# Patient Record
Sex: FEMALE | Race: WHITE | Employment: FULL TIME | URBAN - METROPOLITAN AREA
[De-identification: names, ages, dates, MRNs, and addresses within clinical notes are randomized per-mention and may not be internally consistent; named-entity substitution may affect disease eponyms.]

---

## 2017-02-02 ENCOUNTER — HOSPITAL ENCOUNTER (EMERGENCY)
Age: 57
Discharge: HOME | End: 2017-02-02
Attending: FAMILY MEDICINE

## 2017-02-02 VITALS
TEMPERATURE: 98.1 F | DIASTOLIC BLOOD PRESSURE: 88 MMHG | OXYGEN SATURATION: 97 % | HEART RATE: 83 BPM | HEIGHT: 63 IN | WEIGHT: 196.6 LBS | RESPIRATION RATE: 18 BRPM | BODY MASS INDEX: 34.84 KG/M2 | SYSTOLIC BLOOD PRESSURE: 130 MMHG

## 2017-02-02 DIAGNOSIS — J01.90 ACUTE SINUSITIS, RECURRENCE NOT SPECIFIED, UNSPECIFIED LOCATION: ICD-10-CM

## 2017-02-02 DIAGNOSIS — H65.03 BILATERAL ACUTE SEROUS OTITIS MEDIA, RECURRENCE NOT SPECIFIED: Primary | ICD-10-CM

## 2017-02-02 RX ORDER — FLUTICASONE PROPIONATE 50 MCG
2 SPRAY, SUSPENSION (ML) NASAL DAILY
Qty: 1 BOTTLE | Refills: 0 | Status: SHIPPED | OUTPATIENT
Start: 2017-02-02 | End: 2017-11-30

## 2017-02-02 RX ORDER — AMOXICILLIN AND CLAVULANATE POTASSIUM 875; 125 MG/1; MG/1
1 TABLET, FILM COATED ORAL 2 TIMES DAILY
Qty: 20 TAB | Refills: 0 | Status: SHIPPED | OUTPATIENT
Start: 2017-02-02 | End: 2017-04-10

## 2017-02-02 NOTE — DISCHARGE INSTRUCTIONS
Middle Ear Fluid: Care Instructions  Your Care Instructions    Fluid often builds up inside the ear during a cold or allergies. Usually the fluid drains away, but sometimes a small tube in the ear, called the eustachian tube, stays blocked for months. Symptoms of fluid buildup may include:  · Popping, ringing, or a feeling of fullness or pressure in the ear. · Trouble hearing. · Balance problems and dizziness. In most cases, you can treat yourself at home. Follow-up care is a key part of your treatment and safety. Be sure to make and go to all appointments, and call your doctor if you are having problems. It's also a good idea to know your test results and keep a list of the medicines you take. How can you care for yourself at home? · In most cases, the fluid clears up within a few months without treatment. You may need more tests if the fluid does not clear up after 3 months. · If your doctor prescribed antibiotics, take them as directed. Do not stop taking them just because you feel better. You need to take the full course of antibiotics. When should you call for help? Watch closely for changes in your health, and be sure to contact your doctor if:  · You have pain or a fever. · You have any new symptoms, such as hearing problems. · You do not get better as expected. Where can you learn more? Go to http://anna-sanam.info/. Enter M792 in the search box to learn more about \"Middle Ear Fluid: Care Instructions. \"  Current as of: July 29, 2016  Content Version: 11.1  © 4205-8718 [employer]. Care instructions adapted under license by [employer] (which disclaims liability or warranty for this information). If you have questions about a medical condition or this instruction, always ask your healthcare professional. [place] [address] any warranty or liability for your use of this information.          Saline Nasal Washes: Care Instructions  Your Care Instructions  Saline nasal washes help keep the nasal passages open by washing out thick or dried mucus. This simple remedy can help relieve symptoms of allergies, sinusitis, and colds. It also can make the nose feel more comfortable by keeping the mucous membranes moist. You may notice a little burning sensation in your nose the first few times you use the solution, but this usually gets better in a few days. Follow-up care is a key part of your treatment and safety. Be sure to make and go to all appointments, and call your doctor if you are having problems. It's also a good idea to know your test results and keep a list of the medicines you take. How can you care for yourself at home? · You can buy premixed saline solution in a squeeze bottle or other sinus rinse products at a drugstore. Read and follow the instructions on the label. · You also can make your own saline solution by adding 1 teaspoon of salt and 1 teaspoon of baking soda to 2 cups of distilled water. · If you use a homemade solution, pour a small amount into a clean bowl. Using a rubber bulb syringe, squeeze the syringe and place the tip in the salt water. Pull a small amount of the salt water into the syringe by relaxing your hand. · Sit down with your head tilted slightly back. Do not lie down. Put the tip of the bulb syringe or the squeeze bottle a little way into one of your nostrils. Gently drip or squirt a few drops into the nostril. Repeat with the other nostril. Some sneezing and gagging are normal at first.  · Gently blow your nose. · Wipe the syringe or bottle tip clean after each use. · Repeat this 2 or 3 times a day. · Use nasal washes gently if you have nosebleeds often. When should you call for help? Watch closely for changes in your health, and be sure to contact your doctor if:  · You often get nosebleeds. · You have problems doing the nasal washes. Where can you learn more?   Go to http://anna-sanam.info/. Enter [id number] [id number] [id number] in the search box to learn more about \"Saline Nasal Washes: Care Instructions. \"  Current as of: July 29, 2016  Content Version: 11.1  © 3732-9787 [employer]. Care instructions adapted under license by [employer] (which disclaims liability or warranty for this information). If you have questions about a medical condition or this instruction, always ask your healthcare professional. [address] any warranty or liability for your use of this information.

## 2017-02-02 NOTE — UC PROVIDER NOTE
Patient is a 49 y.o. female presenting with ear pain. The history is provided by the patient. Ear Pain    This is a new problem. The current episode started more than 1 week ago. The problem occurs daily. The problem has been gradually worsening. Patient complains that both ears are affected. There has been no fever. The pain is at a severity of 9/10. The pain is severe. Associated symptoms include headaches. Pertinent negatives include no ear discharge, no hearing loss and no sore throat. The risk factors include recent URI. Her past medical history does not include chronic ear infection. Past Medical History   Diagnosis Date    Hypertension     Sleep apnea         Past Surgical History   Procedure Laterality Date    Hx cholecystectomy      Hx cholecystectomy      Hx  section   and     Hx gyn       ablation    Hx gyn  2015     hysterectomy    Hx cervical diskectomy  2003    Hx orthopaedic       Neck Surgery.  Hx orthopaedic Left      knee surgery x 3    Hx rotator cuff repair Right 2006         Family History   Problem Relation Age of Onset    Hypertension Mother     Hypertension Father     Diabetes Sister     Anesth Problems Neg Hx         Social History     Social History    Marital status:      Spouse name: N/A    Number of children: N/A    Years of education: N/A     Occupational History    Not on file. Social History Main Topics    Smoking status: Never Smoker    Smokeless tobacco: Never Used    Alcohol use Yes      Comment: OCC    Drug use: No    Sexual activity: Not on file     Other Topics Concern    Not on file     Social History Narrative                ALLERGIES: Review of patient's allergies indicates no known allergies. Review of Systems   HENT: Positive for ear pain. Negative for ear discharge, hearing loss and sore throat. Neurological: Positive for headaches.        Vitals:    17 1624   BP: 130/88   Pulse: 83   Resp: 18 Temp: 98.1 °F (36.7 °C)   SpO2: 97%   Weight: 89.2 kg (196 lb 9.6 oz)   Height: 5' 3\" (1.6 m)       Physical Exam   Constitutional: No distress. HENT:   Right Ear: Tympanic membrane and ear canal normal.   Left Ear: Tympanic membrane and ear canal normal.   Nose: Nose normal.   Mouth/Throat: No oropharyngeal exudate, posterior oropharyngeal edema or posterior oropharyngeal erythema. Eyes: Conjunctivae are normal. Right eye exhibits no discharge. Left eye exhibits no discharge. Neck: Neck supple. Pulmonary/Chest: Effort normal and breath sounds normal. No respiratory distress. She has no wheezes. She has no rales. Lymphadenopathy:     She has no cervical adenopathy. Skin: No rash noted. Nursing note and vitals reviewed. MDM     Differential Diagnosis; Clinical Impression; Plan:     CLINICAL IMPRESSION:  Bilateral acute serous otitis media, recurrence not specified  (primary encounter diagnosis)  Acute sinusitis, recurrence not specified, unspecified location      DDX- ET dysfunction    Plan:    Fluids/ gargles  Claritin/ allegra   Tylenol cold-sinus - max strength 1-2 tab 4 times/ day    with Advil as needed    Start augmentin/ flonase and sinus rinse      Amount and/or Complexity of Data Reviewed:    Review and summarize past medical records:  Yes  Risk of Significant Complications, Morbidity, and/or Mortality:   Presenting problems:   Moderate  Progress:   Patient progress:  Stable      Procedures

## 2017-04-10 ENCOUNTER — HOSPITAL ENCOUNTER (EMERGENCY)
Age: 57
Discharge: HOME | End: 2017-04-10
Attending: FAMILY MEDICINE

## 2017-04-10 VITALS
HEIGHT: 63 IN | OXYGEN SATURATION: 99 % | HEART RATE: 87 BPM | RESPIRATION RATE: 18 BRPM | DIASTOLIC BLOOD PRESSURE: 75 MMHG | SYSTOLIC BLOOD PRESSURE: 117 MMHG | WEIGHT: 204.8 LBS | TEMPERATURE: 98.5 F | BODY MASS INDEX: 36.29 KG/M2

## 2017-04-10 DIAGNOSIS — J01.90 ACUTE NON-RECURRENT SINUSITIS, UNSPECIFIED LOCATION: Primary | ICD-10-CM

## 2017-04-10 RX ORDER — BENZONATATE 200 MG/1
200 CAPSULE ORAL
Qty: 30 CAP | Refills: 0 | Status: SHIPPED | OUTPATIENT
Start: 2017-04-10 | End: 2017-11-30

## 2017-04-10 RX ORDER — CEFDINIR 300 MG/1
300 CAPSULE ORAL 2 TIMES DAILY
Qty: 20 CAP | Refills: 0 | Status: SHIPPED | OUTPATIENT
Start: 2017-04-10 | End: 2017-04-20

## 2017-04-10 RX ORDER — PREDNISONE 20 MG/1
60 TABLET ORAL DAILY
Qty: 15 TAB | Refills: 0 | Status: SHIPPED | OUTPATIENT
Start: 2017-04-10 | End: 2017-04-15

## 2017-04-10 NOTE — UC PROVIDER NOTE
Patient is a 49 y.o. female presenting with sinus problems. The history is provided by the patient. No  was used. Sinus Infection    This is a new problem. Episode onset: 6 days. The problem has been gradually worsening. Patient reports a subjective fever - was not measured. The fever has been present for 1 - 2 days. The pain is at a severity of 0/10. The patient is experiencing no pain. Associated symptoms include congestion, sinus pressure, cough and rhinorrhea. Pertinent negatives include no chills, no hoarse voice, no shortness of breath, no neck pain, no neck pain and no headaches. Treatments tried: Zyrtec. The treatment provided no relief. Past Medical History:   Diagnosis Date    Hypertension     Sleep apnea         Past Surgical History:   Procedure Laterality Date    HX CERVICAL DISKECTOMY      HX  SECTION   and     HX CHOLECYSTECTOMY      HX CHOLECYSTECTOMY      HX GYN      ablation    HX GYN  2015    hysterectomy    HX ORTHOPAEDIC      Neck Surgery.  HX ORTHOPAEDIC Left     knee surgery x 3    HX ROTATOR CUFF REPAIR Right 2006         Family History   Problem Relation Age of Onset    Hypertension Mother     Hypertension Father     Diabetes Sister     Anesth Problems Neg Hx         Social History     Social History    Marital status:      Spouse name: N/A    Number of children: N/A    Years of education: N/A     Occupational History    Not on file. Social History Main Topics    Smoking status: Never Smoker    Smokeless tobacco: Never Used    Alcohol use Yes      Comment: OCC    Drug use: No    Sexual activity: Not on file     Other Topics Concern    Not on file     Social History Narrative                ALLERGIES: Review of patient's allergies indicates no known allergies. Review of Systems   Constitutional: Negative. Negative for chills. HENT: Positive for congestion, rhinorrhea, sinus pressure and sneezing. Negative for hoarse voice and voice change. Eyes: Negative. Respiratory: Positive for cough. Negative for shortness of breath. Cardiovascular: Negative. Gastrointestinal: Negative. Endocrine: Negative. Genitourinary: Negative. Musculoskeletal: Negative. Negative for neck pain. Skin: Negative. Allergic/Immunologic: Negative. Neurological: Negative. Negative for headaches. Hematological: Negative. Psychiatric/Behavioral: Negative. Vitals:    04/10/17 1448   BP: 117/75   Pulse: 87   Resp: 18   Temp: 98.5 °F (36.9 °C)   SpO2: 99%   Weight: 92.9 kg (204 lb 12.8 oz)   Height: 5' 3\" (1.6 m)       Physical Exam   Constitutional: She is oriented to person, place, and time. She appears well-developed and well-nourished. HENT:   Head: Normocephalic and atraumatic. Right Ear: External ear normal.   Left Ear: External ear normal.   Nose: Nose normal.   Mouth/Throat: Oropharynx is clear and moist.   Eyes: Conjunctivae and EOM are normal. Pupils are equal, round, and reactive to light. Neck: Normal range of motion. Neck supple. Cardiovascular: Normal rate, regular rhythm and normal heart sounds. Pulmonary/Chest: Effort normal and breath sounds normal.   Musculoskeletal: Normal range of motion. Neurological: She is alert and oriented to person, place, and time. Skin: Skin is warm and dry. Psychiatric: She has a normal mood and affect. Her behavior is normal. Judgment and thought content normal.   Nursing note and vitals reviewed. MDM     Differential Diagnosis; Clinical Impression; Plan:     CLINICAL IMPRESSION:  Acute non-recurrent sinusitis, unspecified location  (primary encounter diagnosis)    Plan:  1. Sinusitis  2. Use a nasal rinse and take all medications as directed. 3. Follow up with PCP as needed. Risk of Significant Complications, Morbidity, and/or Mortality:   Presenting problems: Moderate  Diagnostic procedures:   Moderate  Progress:   Patient progress:  Stable      Procedures

## 2017-04-10 NOTE — DISCHARGE INSTRUCTIONS
Saline Nasal Washes: Care Instructions  Your Care Instructions  Saline nasal washes help keep the nasal passages open by washing out thick or dried mucus. This simple remedy can help relieve symptoms of allergies, sinusitis, and colds. It also can make the nose feel more comfortable by keeping the mucous membranes moist. You may notice a little burning sensation in your nose the first few times you use the solution, but this usually gets better in a few days. Follow-up care is a key part of your treatment and safety. Be sure to make and go to all appointments, and call your doctor if you are having problems. It's also a good idea to know your test results and keep a list of the medicines you take. How can you care for yourself at home? · You can buy premixed saline solution in a squeeze bottle or other sinus rinse products at a drugstore. Read and follow the instructions on the label. · You also can make your own saline solution by adding 1 teaspoon of salt and 1 teaspoon of baking soda to 2 cups of distilled water. · If you use a homemade solution, pour a small amount into a clean bowl. Using a rubber bulb syringe, squeeze the syringe and place the tip in the salt water. Pull a small amount of the salt water into the syringe by relaxing your hand. · Sit down with your head tilted slightly back. Do not lie down. Put the tip of the bulb syringe or the squeeze bottle a little way into one of your nostrils. Gently drip or squirt a few drops into the nostril. Repeat with the other nostril. Some sneezing and gagging are normal at first.  · Gently blow your nose. · Wipe the syringe or bottle tip clean after each use. · Repeat this 2 or 3 times a day. · Use nasal washes gently if you have nosebleeds often. When should you call for help? Watch closely for changes in your health, and be sure to contact your doctor if:  · You often get nosebleeds. · You have problems doing the nasal washes.   Where can you learn more? Go to http://anna-sanam.info/. Enter [id number] in the search box to learn more about \"Saline Nasal Washes: Care Instructions. \"  Current as of: July 29, 2016  Content Version: 11.2  © 5770-0945 [employer]. Care instructions adapted under license by [employer] (which disclaims liability or warranty for this information). If you have questions about a medical condition or this instruction, always ask your healthcare professional. [address] any warranty or liability for your use of this information. Sinusitis: Care Instructions  Your Care Instructions    Sinusitis is an infection of the lining of the sinus cavities in your head. Sinusitis often follows a cold. It causes pain and pressure in your head and face. In most cases, sinusitis gets better on its own in 1 to 2 weeks. But some mild symptoms may last for several weeks. Sometimes antibiotics are needed. Follow-up care is a key part of your treatment and safety. Be sure to make and go to all appointments, and call your doctor if you are having problems. It's also a good idea to know your test results and keep a list of the medicines you take. How can you care for yourself at home? · Take an over-the-counter pain medicine, such as acetaminophen (Tylenol), ibuprofen (Advil, Motrin), or naproxen (Aleve). Read and follow all instructions on the label. · If the doctor prescribed antibiotics, take them as directed. Do not stop taking them just because you feel better. You need to take the full course of antibiotics. · Be careful when taking over-the-counter cold or flu medicines and Tylenol at the same time. Many of these medicines have acetaminophen, which is Tylenol. Read the labels to make sure that you are not taking more than the recommended dose. Too much acetaminophen (Tylenol) can be harmful.   · Breathe warm, moist air from a steamy shower, a hot bath, or a sink filled with hot water. Avoid cold, dry air. Using a humidifier in your home may help. Follow the directions for cleaning the machine. · Use saline (saltwater) nasal washes to help keep your nasal passages open and wash out mucus and bacteria. You can buy saline nose drops at a grocery store or drugstore. Or you can make your own at home by adding 1 teaspoon of salt and 1 teaspoon of baking soda to 2 cups of distilled water. If you make your own, fill a bulb syringe with the solution, insert the tip into your nostril, and squeeze gently. Inyo Maffucci your nose. · Put a hot, wet towel or a warm gel pack on your face 3 or 4 times a day for 5 to 10 minutes each time. · Try a decongestant nasal spray like oxymetazoline (Afrin). Do not use it for more than 3 days in a row. Using it for more than 3 days can make your congestion worse. When should you call for help? Call your doctor now or seek immediate medical care if:  · You have new or worse swelling or redness in your face or around your eyes. · You have a new or higher fever. Watch closely for changes in your health, and be sure to contact your doctor if:  · You have new or worse facial pain. · The mucus from your nose becomes thicker (like pus) or has new blood in it. · You are not getting better as expected. Where can you learn more? Go to http://anna-sanam.info/. Enter M049 in the search box to learn more about \"Sinusitis: Care Instructions. \"  Current as of: July 29, 2016  Content Version: 11.2  © 3737-3152 [employer]. Care instructions adapted under license by [employer] (which disclaims liability or warranty for this information). If you have questions about a medical condition or this instruction, always ask your healthcare professional. [address] any warranty or liability for your use of this information.

## 2017-11-10 ENCOUNTER — HOSPITAL ENCOUNTER (OUTPATIENT)
Dept: CT IMAGING | Age: 57
Discharge: HOME | End: 2017-11-10
Attending: PHYSICIAN ASSISTANT
Payer: COMMERCIAL

## 2017-11-10 DIAGNOSIS — K57.92 DIVERTICULITIS: ICD-10-CM

## 2017-11-10 DIAGNOSIS — R12 HEART BURN: ICD-10-CM

## 2017-11-10 DIAGNOSIS — R10.9 ABDOMINAL PAIN: ICD-10-CM

## 2017-11-10 PROCEDURE — 74011000255 HC RX REV CODE- 255: Performed by: PHYSICIAN ASSISTANT

## 2017-11-10 PROCEDURE — 74011636320 HC RX REV CODE- 636/320: Performed by: PHYSICIAN ASSISTANT

## 2017-11-10 PROCEDURE — 74177 CT ABD & PELVIS W/CONTRAST: CPT

## 2017-11-10 PROCEDURE — 74011250636 HC RX REV CODE- 250/636: Performed by: PHYSICIAN ASSISTANT

## 2017-11-10 RX ORDER — BARIUM SULFATE 20 MG/ML
900 SUSPENSION ORAL
Status: COMPLETED | OUTPATIENT
Start: 2017-11-10 | End: 2017-11-10

## 2017-11-10 RX ORDER — SODIUM CHLORIDE 0.9 % (FLUSH) 0.9 %
10 SYRINGE (ML) INJECTION
Status: COMPLETED | OUTPATIENT
Start: 2017-11-10 | End: 2017-11-10

## 2017-11-10 RX ORDER — SODIUM CHLORIDE 9 MG/ML
50 INJECTION, SOLUTION INTRAVENOUS
Status: COMPLETED | OUTPATIENT
Start: 2017-11-10 | End: 2017-11-10

## 2017-11-10 RX ADMIN — IOPAMIDOL 100 ML: 755 INJECTION, SOLUTION INTRAVENOUS at 17:55

## 2017-11-10 RX ADMIN — BARIUM SULFATE 900 ML: 21 SUSPENSION ORAL at 17:55

## 2017-11-10 RX ADMIN — Medication 10 ML: at 17:55

## 2017-11-10 RX ADMIN — SODIUM CHLORIDE 50 ML/HR: 900 INJECTION, SOLUTION INTRAVENOUS at 17:55

## 2017-11-30 ENCOUNTER — HOSPITAL ENCOUNTER (OUTPATIENT)
Dept: CT IMAGING | Age: 57
Discharge: HOME | End: 2017-11-30
Attending: UROLOGY
Payer: COMMERCIAL

## 2017-11-30 VITALS
DIASTOLIC BLOOD PRESSURE: 70 MMHG | HEART RATE: 68 BPM | RESPIRATION RATE: 16 BRPM | HEIGHT: 63 IN | SYSTOLIC BLOOD PRESSURE: 126 MMHG | OXYGEN SATURATION: 97 % | TEMPERATURE: 98.5 F | BODY MASS INDEX: 34.02 KG/M2 | WEIGHT: 192 LBS

## 2017-11-30 DIAGNOSIS — N28.89 RENAL MASS: ICD-10-CM

## 2017-11-30 LAB
ABO + RH BLD: NORMAL
BASOPHILS # BLD: 0 K/UL (ref 0–0.1)
BASOPHILS NFR BLD: 0 % (ref 0–1)
BLOOD GROUP ANTIBODIES SERPL: NORMAL
EOSINOPHIL # BLD: 0.2 K/UL (ref 0–0.4)
EOSINOPHIL NFR BLD: 2 % (ref 0–7)
ERYTHROCYTE [DISTWIDTH] IN BLOOD BY AUTOMATED COUNT: 13.8 % (ref 11.5–14.5)
HCT VFR BLD AUTO: 41.5 % (ref 35–47)
HGB BLD-MCNC: 14.9 G/DL (ref 11.5–16)
LYMPHOCYTES # BLD: 2.5 K/UL (ref 0.8–3.5)
LYMPHOCYTES NFR BLD: 23 % (ref 12–49)
MCH RBC QN AUTO: 31 PG (ref 26–34)
MCHC RBC AUTO-ENTMCNC: 35.9 G/DL (ref 30–36.5)
MCV RBC AUTO: 86.3 FL (ref 80–99)
MONOCYTES # BLD: 0.9 K/UL (ref 0–1)
MONOCYTES NFR BLD: 8 % (ref 5–13)
NEUTS SEG # BLD: 7.5 K/UL (ref 1.8–8)
NEUTS SEG NFR BLD: 67 % (ref 32–75)
PLATELET # BLD AUTO: 242 K/UL (ref 150–400)
RBC # BLD AUTO: 4.81 M/UL (ref 3.8–5.2)
SPECIMEN EXP DATE BLD: NORMAL
WBC # BLD AUTO: 11 K/UL (ref 3.6–11)

## 2017-11-30 PROCEDURE — 88173 CYTOPATH EVAL FNA REPORT: CPT | Performed by: UROLOGY

## 2017-11-30 PROCEDURE — 77030003666 HC NDL SPINAL BD -A

## 2017-11-30 PROCEDURE — 88305 TISSUE EXAM BY PATHOLOGIST: CPT | Performed by: UROLOGY

## 2017-11-30 PROCEDURE — 77030018781

## 2017-11-30 PROCEDURE — 88333 PATH CONSLTJ SURG CYTO XM 1: CPT | Performed by: UROLOGY

## 2017-11-30 PROCEDURE — 77030003503 HC NDL BIOP TISS BD -B

## 2017-11-30 PROCEDURE — 36415 COLL VENOUS BLD VENIPUNCTURE: CPT | Performed by: RADIOLOGY

## 2017-11-30 PROCEDURE — 50200 RENAL BIOPSY PERQ: CPT

## 2017-11-30 PROCEDURE — 99152 MOD SED SAME PHYS/QHP 5/>YRS: CPT

## 2017-11-30 PROCEDURE — 85025 COMPLETE CBC W/AUTO DIFF WBC: CPT | Performed by: RADIOLOGY

## 2017-11-30 PROCEDURE — 74011250636 HC RX REV CODE- 250/636: Performed by: RADIOLOGY

## 2017-11-30 PROCEDURE — 86900 BLOOD TYPING SEROLOGIC ABO: CPT | Performed by: RADIOLOGY

## 2017-11-30 PROCEDURE — 74011000250 HC RX REV CODE- 250: Performed by: UROLOGY

## 2017-11-30 RX ORDER — SODIUM CHLORIDE 9 MG/ML
25 INJECTION, SOLUTION INTRAVENOUS CONTINUOUS
Status: DISCONTINUED | OUTPATIENT
Start: 2017-11-30 | End: 2017-11-30

## 2017-11-30 RX ORDER — FENTANYL CITRATE 50 UG/ML
100 INJECTION, SOLUTION INTRAMUSCULAR; INTRAVENOUS
Status: DISCONTINUED | OUTPATIENT
Start: 2017-11-30 | End: 2017-11-30

## 2017-11-30 RX ORDER — LIDOCAINE HYDROCHLORIDE 10 MG/ML
10 INJECTION, SOLUTION EPIDURAL; INFILTRATION; INTRACAUDAL; PERINEURAL
Status: COMPLETED | OUTPATIENT
Start: 2017-11-30 | End: 2017-11-30

## 2017-11-30 RX ORDER — MIDAZOLAM HYDROCHLORIDE 1 MG/ML
5 INJECTION, SOLUTION INTRAMUSCULAR; INTRAVENOUS
Status: DISCONTINUED | OUTPATIENT
Start: 2017-11-30 | End: 2017-11-30

## 2017-11-30 RX ADMIN — LIDOCAINE HYDROCHLORIDE 10 ML: 10 INJECTION, SOLUTION EPIDURAL; INFILTRATION; INTRACAUDAL; PERINEURAL at 11:29

## 2017-11-30 RX ADMIN — SODIUM CHLORIDE 25 ML/HR: 900 INJECTION, SOLUTION INTRAVENOUS at 11:25

## 2017-11-30 RX ADMIN — MIDAZOLAM HYDROCHLORIDE 1 MG: 1 INJECTION, SOLUTION INTRAMUSCULAR; INTRAVENOUS at 11:27

## 2017-11-30 RX ADMIN — FENTANYL CITRATE 25 MCG: 50 INJECTION, SOLUTION INTRAMUSCULAR; INTRAVENOUS at 11:36

## 2017-11-30 RX ADMIN — MIDAZOLAM HYDROCHLORIDE 1 MG: 1 INJECTION, SOLUTION INTRAMUSCULAR; INTRAVENOUS at 11:28

## 2017-11-30 RX ADMIN — FENTANYL CITRATE 25 MCG: 50 INJECTION, SOLUTION INTRAMUSCULAR; INTRAVENOUS at 11:43

## 2017-11-30 RX ADMIN — FENTANYL CITRATE 25 MCG: 50 INJECTION, SOLUTION INTRAMUSCULAR; INTRAVENOUS at 12:39

## 2017-11-30 RX ADMIN — MIDAZOLAM HYDROCHLORIDE 1 MG: 1 INJECTION, SOLUTION INTRAMUSCULAR; INTRAVENOUS at 11:43

## 2017-11-30 RX ADMIN — MIDAZOLAM HYDROCHLORIDE 2 MG: 1 INJECTION, SOLUTION INTRAMUSCULAR; INTRAVENOUS at 11:34

## 2017-11-30 RX ADMIN — MIDAZOLAM HYDROCHLORIDE 1 MG: 1 INJECTION, SOLUTION INTRAMUSCULAR; INTRAVENOUS at 11:49

## 2017-11-30 RX ADMIN — FENTANYL CITRATE 50 MCG: 50 INJECTION, SOLUTION INTRAMUSCULAR; INTRAVENOUS at 11:26

## 2017-11-30 RX ADMIN — FENTANYL CITRATE 50 MCG: 50 INJECTION, SOLUTION INTRAMUSCULAR; INTRAVENOUS at 12:12

## 2017-11-30 NOTE — IP AVS SNAPSHOT
[address] [facility] 
[phone] Patient: Sravanthikamolly Friday MRN: [id number] TVN:[id number] My Medications ASK your physician about these medications Instructions Each Dose to Equal  
 Morning Noon Evening Bedtime  
 hydroCHLOROthiazide 25 mg tablet Commonly known as:  HYDRODIURIL Your last dose was: Your next dose is: Take 12.5 mg by mouth daily. 12.5 mg PROBIOTIC 4X 10-15 mg Tbec Generic drug:  B.infantis-B.ani-B.long-B.bifi Your last dose was: Your next dose is: Take 10-15 mg by mouth daily.   
 10-15 mg

## 2017-11-30 NOTE — ROUTINE PROCESS
1000- Pt in for right renal biopsy. Workup completed. 1- Dr Petra Clay in to consult pt. Pt aware of risks and benefits and wishes to proceed. Consent obtained. 1115- Labs reviewed with Dr Petra Clay. 1230- Pt in recovery. Medicated with fentanyl for c/o back  Pain. VSS. No S/Sx of bleeding. 1330- Pt marciano PO. Pain to back decreasing. 1450- Discharge instructions reviewed with pt. Pt verbalized understanding. 1500-Discharged to home.

## 2017-11-30 NOTE — H&P
Radiology History and Physical    Patient: Néstor Mahoney 56 y.o. female     Referring Physician: Cecile Pedraza. Chief Complaint: No chief complaint on file. History of Present Illness: Renal mass. History:    Past Medical History:   Diagnosis Date    Hypertension     Sleep apnea      Family History   Problem Relation Age of Onset    Hypertension Mother     Hypertension Father     Diabetes Sister     Anesth Problems Neg Hx      Social History     Social History    Marital status:      Spouse name: N/A    Number of children: N/A    Years of education: N/A     Occupational History    Not on file. Social History Main Topics    Smoking status: Never Smoker    Smokeless tobacco: Never Used    Alcohol use Yes      Comment: OCC    Drug use: No    Sexual activity: Not on file     Other Topics Concern    Not on file     Social History Narrative       Allergies: No Known Allergies    Current Medications:  Current Outpatient Prescriptions   Medication Sig    B.infantis-B.ani-B.long-B.bifi (PROBIOTIC 4X) 10-15 mg TbEC Take 10-15 mg by mouth daily.  hydrochlorothiazide (HYDRODIURIL) 25 mg tablet Take 12.5 mg by mouth daily. Current Facility-Administered Medications   Medication Dose Route Frequency    0.9% sodium chloride infusion  25 mL/hr IntraVENous CONTINUOUS    fentaNYL citrate (PF) injection 100 mcg  100 mcg IntraVENous Multiple    midazolam (VERSED) injection 5 mg  5 mg IntraVENous Multiple        Physical Exam:  Blood pressure 122/79, pulse 69, temperature 98.5 °F (36.9 °C), resp. rate 16, height 5' 3\" (1.6 m), weight 87.1 kg (192 lb), last menstrual period 08/26/2014, SpO2 98 %, not currently breastfeeding.   GENERAL: alert, cooperative, no distress, appears stated age, LUNG: clear to auscultation bilaterally, HEART: regular rate and rhythm      Alerts:    Hospital Problems  Date Reviewed: 11/30/2017    None          Laboratory:      Recent Labs      11/30/17   1030   HGB  14.9 HCT  41.5   WBC  11.0   PLT  242         Plan of Care/Planned Procedure:  Risks, benefits, and alternatives reviewed with patient and she agrees to proceed with the procedure. Full dictated report to follow.

## 2017-11-30 NOTE — DISCHARGE INSTRUCTIONS
[address] [facility] INSTRUCTIONS    General Instructions:     A biopsy is the removal of a small piece of tissue for microscopic examination or testing. Healthy tissue can be obtained for the purpose of tissue-type matching for transplants. Unhealthy tissues are more commonly biopsied to diagnose disease. Renal Biopsy: This procedure is sometimes done to evaluate a transplanted kidney. It is also used to evaluate unexplained decrease in kidney function, blood, or protein in the urine. You may see bright red blood in the urine the first 24 hours after the test. If the bleeding lasts longer, you need to call your doctor. There is a risk of infection and bleeding into the muscle, which may cause soreness. General Biopsy:     A mass can grow in any area of the body, and we are taking a specimen as ordered by your doctor. The risks are the same. They include bleeding, pain, and infection. Home Care Instructions: You may resume your regular diet and medication regimen. Do not drink alcohol, drive, or make any important legal decisions in the next 24 hours. Do not lift anything heavier than a gallon of milk until the soreness goes away. You may use over the counter acetaminophen or ibuprofen for the soreness. You may apply an ice pack to the affected area for 20-30 minutes at time for the first 24 hours. After that, you may apply a heat pack. Call If: You should call your Physician and/or the Radiology Nurse if you have any questions or concerns about the biopsy site. Call if you should have increased pain, fever, redness, drainage, or bleeding more than a small spot on the bandage. Follow-Up Instructions: Please see your ordering doctor as he/she has requested.     To Reach Us:  [phone]      Patient Signature:  Date: 11/30/2017  Discharging Nurse: Michael Milner RN

## 2017-11-30 NOTE — IP AVS SNAPSHOT
[address] [facility] 83. 
[phone] Patient: Luanne Chandler MRN: [id number] SRQ:0/5/5380 About your hospitalization You were admitted on:  November 30, 2017 You last received care in the:  [facility] CT You were discharged on:  November 30, 2017 Why you were hospitalized Your primary diagnosis was:  Not on File Discharge Orders None A check renetta indicates which time of day the medication should be taken. My Medications ASK your physician about these medications Instructions Each Dose to Equal  
 Morning Noon Evening Bedtime  
 hydroCHLOROthiazide 25 mg tablet Commonly known as:  HYDRODIURIL Your last dose was: Your next dose is: Take 12.5 mg by mouth daily. 12.5 mg PROBIOTIC 4X 10-15 mg Tbec Generic drug:  B.infantis-B.ani-B.long-B.bifi Your last dose was: Your next dose is: Take 10-15 mg by mouth daily. 10-15 mg Discharge Instructions [address] [facility] General Instructions: A biopsy is the removal of a small piece of tissue for microscopic examination or testing. Healthy tissue can be obtained for the purpose of tissue-type matching for transplants. Unhealthy tissues are more commonly biopsied to diagnose disease. Renal Biopsy: This procedure is sometimes done to evaluate a transplanted kidney. It is also used to evaluate unexplained decrease in kidney function, blood, or protein in the urine. You may see bright red blood in the urine the first 24 hours after the test. If the bleeding lasts longer, you need to call your doctor. There is a risk of infection and bleeding into the muscle, which may cause soreness. General Biopsy: A mass can grow in any area of the body, and we are taking a specimen as ordered by your doctor. The risks are the same. They include bleeding, pain, and infection. Home Care Instructions: You may resume your regular diet and medication regimen. Do not drink alcohol, drive, or make any important legal decisions in the next 24 hours. Do not lift anything heavier than a gallon of milk until the soreness goes away. You may use over the counter acetaminophen or ibuprofen for the soreness. You may apply an ice pack to the affected area for 20-30 minutes at time for the first 24 hours. After that, you may apply a heat pack. Call If: You should call your Physician and/or the Radiology Nurse if you have any questions or concerns about the biopsy site. Call if you should have increased pain, fever, redness, drainage, or bleeding more than a small spot on the bandage. Follow-Up Instructions: Please see your ordering doctor as he/she has requested. To Reach Us:  [phone] Patient Signature: 
Date: 11/30/2017 Discharging Nurse: Alon Dodson RN Introducing [facility] & HEALTH SERVICES! [employer] introduces [employer] patient portal. Now you can access parts of your medical record, email your doctor's office, and request medication refills online. 1. In your internet browser, go to [email or web address] Laboratories 2. Click on the First Time User? Click Here link in the Sign In box. You will see the New Member Sign Up page. 3. Enter your [employer] Access Code exactly as it appears below. You will not need to use this code after youve completed the sign-up process. If you do not sign up before the expiration date, you must request a new code. · [employer] Access Code: [email or web address] Expires: 2/8/2018 12:58 PM 
 
4. Enter the last four digits of your Social Security Number (xxxx) and Date of Birth (mm/dd/yyyy) as indicated and click Submit.  You will be taken to the next sign-up page. 5. Create a Krillion ID. This will be your Krillion login ID and cannot be changed, so think of one that is secure and easy to remember. 6. Create a Krillion password. You can change your password at any time. 7. Enter your Password Reset Question and Answer. This can be used at a later time if you forget your password. 8. Enter your e-mail address. You will receive e-mail notification when new information is available in [address] 9. Click Sign Up. You can now view and download portions of your medical record. 10. Click the Download Summary menu link to download a portable copy of your medical information. If you have questions, please visit the Frequently Asked Questions section of the Kr[employer] website. Remember, [employer] is NOT to be used for urgent needs. For medical emergencies, dial 911. Now available from your iPhone and Android! Providers Seen During Your Hospitalization Provider Specialty Primary office phone Romulo Green MD Urology [phone] Your Primary Care Physician (PCP) Primary Care Physician Office Phone Office Fax Birgit Lopez [phone] You are allergic to the following No active allergies Recent Documentation Height Weight Breastfeeding? BMI OB Status Smoking Status 1.6 m 87.1 kg No 34.01 kg/m2 Hysterectomy Never Smoker Emergency Contacts Name Discharge Info Relation Home Work Mobile Rodri Short DISCHARGE CAREGIVER [3] Spouse [3] [phone] [facility] DISCHARGE CAREGIVER [3] Sister [23] [phone] Patient Belongings The following personal items are in your possession at time of discharge: 
     Visual Aid: Glasses Please provide this summary of care documentation to your next provider.  
  
  
 
  
Signatures-by signing, you are acknowledging that this After Visit Summary has been reviewed with you and you have received a copy. Patient Signature:  ____________________________________________________________ Date:  ____________________________________________________________  
  
Luke Clare Provider Signature:  ____________________________________________________________ Date:  ____________________________________________________________

## 2018-03-27 ENCOUNTER — OFFICE VISIT (OUTPATIENT)
Dept: PRIMARY CARE CLINIC | Age: 58
End: 2018-03-27

## 2018-03-27 VITALS
OXYGEN SATURATION: 97 % | RESPIRATION RATE: 20 BRPM | DIASTOLIC BLOOD PRESSURE: 81 MMHG | HEIGHT: 63 IN | TEMPERATURE: 97.9 F | SYSTOLIC BLOOD PRESSURE: 118 MMHG | HEART RATE: 77 BPM | WEIGHT: 205 LBS | BODY MASS INDEX: 36.32 KG/M2

## 2018-03-27 DIAGNOSIS — Z90.5 S/P NEPHRECTOMY: ICD-10-CM

## 2018-03-27 DIAGNOSIS — R10.33 PERIUMBILICAL PAIN: ICD-10-CM

## 2018-03-27 DIAGNOSIS — R10.11 RUQ ABDOMINAL PAIN: ICD-10-CM

## 2018-03-27 DIAGNOSIS — R10.13 EPIGASTRIC ABDOMINAL PAIN: Primary | ICD-10-CM

## 2018-03-27 LAB
BILIRUB UR QL STRIP: NEGATIVE
GLUCOSE UR-MCNC: NEGATIVE MG/DL
KETONES P FAST UR STRIP-MCNC: NEGATIVE MG/DL
PH UR STRIP: 5.5 [PH] (ref 4.6–8)
PROT UR QL STRIP: NEGATIVE
SP GR UR STRIP: 1.01 (ref 1–1.03)
UA UROBILINOGEN AMB POC: NORMAL (ref 0.2–1)
URINALYSIS CLARITY POC: CLEAR
URINALYSIS COLOR POC: YELLOW
URINE BLOOD POC: NEGATIVE
URINE LEUKOCYTES POC: NEGATIVE
URINE NITRITES POC: NEGATIVE

## 2018-03-27 RX ORDER — POLYETHYLENE GLYCOL 3350, SODIUM CHLORIDE, SODIUM BICARBONATE, POTASSIUM CHLORIDE 420; 11.2; 5.72; 1.48 G/4L; G/4L; G/4L; G/4L
POWDER, FOR SOLUTION ORAL
COMMUNITY
End: 2018-03-29

## 2018-03-27 RX ORDER — HYDROCHLOROTHIAZIDE 25 MG/1
TABLET ORAL
COMMUNITY
End: 2021-01-10

## 2018-03-27 RX ORDER — NAPROXEN SODIUM 550 MG/1
550 TABLET ORAL
COMMUNITY
End: 2018-03-29 | Stop reason: ALTCHOICE

## 2018-03-27 NOTE — PATIENT INSTRUCTIONS
Abdominal Pain: Care Instructions  Your Care Instructions    Abdominal pain has many possible causes. Some aren't serious and get better on their own in a few days. Others need more testing and treatment. If your pain continues or gets worse, you need to be rechecked and may need more tests to find out what is wrong. You may need surgery to correct the problem. Don't ignore new symptoms, such as fever, nausea and vomiting, urination problems, pain that gets worse, and dizziness. These may be signs of a more serious problem. Your doctor may have recommended a follow-up visit in the next 8 to 12 hours. If you are not getting better, you may need more tests or treatment. The doctor has checked you carefully, but problems can develop later. If you notice any problems or new symptoms, get medical treatment right away. Follow-up care is a key part of your treatment and safety. Be sure to make and go to all appointments, and call your doctor if you are having problems. It's also a good idea to know your test results and keep a list of the medicines you take. How can you care for yourself at home? · Rest until you feel better. · To prevent dehydration, drink plenty of fluids, enough so that your urine is light yellow or clear like water. Choose water and other caffeine-free clear liquids until you feel better. If you have kidney, heart, or liver disease and have to limit fluids, talk with your doctor before you increase the amount of fluids you drink. · If your stomach is upset, eat mild foods, such as rice, dry toast or crackers, bananas, and applesauce. Try eating several small meals instead of two or three large ones. · Wait until 48 hours after all symptoms have gone away before you have spicy foods, alcohol, and drinks that contain caffeine. · Do not eat foods that are high in fat. · Avoid anti-inflammatory medicines such as aspirin, ibuprofen (Advil, Motrin), and naproxen (Aleve).  These can cause stomach upset. Talk to your doctor if you take daily aspirin for another health problem. When should you call for help? Call 911 anytime you think you may need emergency care. For example, call if:  ? · You passed out (lost consciousness). ? · You pass maroon or very bloody stools. ? · You vomit blood or what looks like coffee grounds. ? · You have new, severe belly pain. ?Call your doctor now or seek immediate medical care if:  ? · Your pain gets worse, especially if it becomes focused in one area of your belly. ? · You have a new or higher fever. ? · Your stools are black and look like tar, or they have streaks of blood. ? · You have unexpected vaginal bleeding. ? · You have symptoms of a urinary tract infection. These may include:  ¨ Pain when you urinate. ¨ Urinating more often than usual.  ¨ Blood in your urine. ? · You are dizzy or lightheaded, or you feel like you may faint. ? Watch closely for changes in your health, and be sure to contact your doctor if:  ? · You are not getting better after 1 day (24 hours). Where can you learn more? Go to http://anna-sanam.info/. Enter L118 in the search box to learn more about \"Abdominal Pain: Care Instructions. \"  Current as of: March 20, 2017  Content Version: 11.4  © 1206-5574 [employer]. Care instructions adapted under license by [employer] (which disclaims liability or warranty for this information). If you have questions about a medical condition or this instruction, always ask your healthcare professional. [place] [address] any warranty or liability for your use of this information.

## 2018-03-27 NOTE — MR AVS SNAPSHOT
[address] [place] [address] 
[phone] Patient: Robson Graff MRN: [id number] WALE:[birth year] Visit Information Date & Time Provider Department Dept. Phone Encounter #  
 3/27/2018  4:30 PM Kisha Landry NP [facility] [address] [id number] Follow-up Instructions Return if symptoms worsen or fail to improve. Upcoming Health Maintenance Date Due Hepatitis C Screening 1960 PAP AKA CERVICAL CYTOLOGY 5/8/1983 FOBT Q 1 YEAR AGE 50-75 5/8/2012 Influenza Age 5 to Adult 8/1/2017 BREAST CANCER SCRN MAMMOGRAM 11/4/2018 DTaP/Tdap/Td series (2 - Td) 7/28/2023 Allergies as of 3/27/2018  Review Complete On: 3/27/2018 By: Renetta Garza LPN No Known Allergies Current Immunizations  Never Reviewed Name Date Tdap 7/28/2013  2:34 PM  
  
 Not reviewed this visit You Were Diagnosed With   
  
 Codes Comments Epigastric abdominal pain    -  Primary ICD-10-CM: R10.13 ICD-9-CM: 789.06   
 RUQ abdominal pain     ICD-10-CM: R10.11 ICD-9-CM: 789.01 Periumbilical pain     RT--KM: R10.33 ICD-9-CM: 789.05 S/p nephrectomy     ICD-10-CM: Z90.5 ICD-9-CM: V45.73 Vitals BP Pulse Temp Resp Height(growth percentile) Weight(growth percentile) 118/81 (BP 1 Location: Left arm, BP Patient Position: Sitting) 77 97.9 °F (36.6 °C) (Oral) 20 5' 3\" (1.6 m) 205 lb (93 kg) LMP SpO2 BMI OB Status Smoking Status 08/26/2014 97% 36.31 kg/m2 Hysterectomy Never Smoker Vitals History BMI and BSA Data Body Mass Index Body Surface Area  
 36.31 kg/m 2 2.03 m 2 Preferred Pharmacy Pharmacy Name Phone [id number] [address] [phone] Your Updated Medication List  
  
   
This list is accurate as of 3/27/18  6:02 PM.  Always use your most recent med list.  
  
  
  
  
 hydroCHLOROthiazide 25 mg tablet Commonly known as:  HYDRODIURIL Take 1/2 tablet by mouth once a day  
  
 naproxen sodium 550 mg tablet Commonly known as:  ANAPROX  
550 mg. PROBIOTIC 4X 10-15 mg Tbec Generic drug:  B.infantis-B.ani-B.long-B.bifi Take 10-15 mg by mouth daily. TRILYTE WITH FLAVOR PACKETS 420 gram solution Generic drug:  peg-electrolyte soln Take as directed We Performed the Following AMB POC URINALYSIS DIP STICK AUTO W/O MICRO [30681 CPT(R)] CBC WITH AUTOMATED DIFF [89276 CPT(R)] LIPASE [id number] CPT(R)] METABOLIC PANEL, COMPREHENSIVE [56827 CPT(R)] Follow-up Instructions Return if symptoms worsen or fail to improve. To-Do List   
 03/27/2018 Imaging:  CT ABD PELV W CONT Referral Information Referral ID Referred By Referred To  
  
 [id number] Neymar Tadeo Not Available Visits Status Start Date End Date 1 New Request 3/27/18 3/27/19 If your referral has a status of pending review or denied, additional information will be sent to support the outcome of this decision. Patient Instructions Abdominal Pain: Care Instructions Your Care Instructions Abdominal pain has many possible causes. Some aren't serious and get better on their own in a few days. Others need more testing and treatment. If your pain continues or gets worse, you need to be rechecked and may need more tests to find out what is wrong. You may need surgery to correct the problem. Don't ignore new symptoms, such as fever, nausea and vomiting, urination problems, pain that gets worse, and dizziness. These may be signs of a more serious problem. Your doctor may have recommended a follow-up visit in the next 8 to 12 hours. If you are not getting better, you may need more tests or treatment. The doctor has checked you carefully, but problems can develop later.  If you notice any problems or new symptoms, get medical treatment right away. Follow-up care is a key part of your treatment and safety. Be sure to make and go to all appointments, and call your doctor if you are having problems. It's also a good idea to know your test results and keep a list of the medicines you take. How can you care for yourself at home? · Rest until you feel better. · To prevent dehydration, drink plenty of fluids, enough so that your urine is light yellow or clear like water. Choose water and other caffeine-free clear liquids until you feel better. If you have kidney, heart, or liver disease and have to limit fluids, talk with your doctor before you increase the amount of fluids you drink. · If your stomach is upset, eat mild foods, such as rice, dry toast or crackers, bananas, and applesauce. Try eating several small meals instead of two or three large ones. · Wait until 48 hours after all symptoms have gone away before you have spicy foods, alcohol, and drinks that contain caffeine. · Do not eat foods that are high in fat. · Avoid anti-inflammatory medicines such as aspirin, ibuprofen (Advil, Motrin), and naproxen (Aleve). These can cause stomach upset. Talk to your doctor if you take daily aspirin for another health problem. When should you call for help? Call 911 anytime you think you may need emergency care. For example, call if: 
? · You passed out (lost consciousness). ? · You pass maroon or very bloody stools. ? · You vomit blood or what looks like coffee grounds. ? · You have new, severe belly pain. ?Call your doctor now or seek immediate medical care if: 
? · Your pain gets worse, especially if it becomes focused in one area of your belly. ? · You have a new or higher fever. ? · Your stools are black and look like tar, or they have streaks of blood. ? · You have unexpected vaginal bleeding. ? · You have symptoms of a urinary tract infection. These may include: ¨ Pain when you urinate. ¨ Urinating more often than usual. 
¨ Blood in your urine. ? · You are dizzy or lightheaded, or you feel like you may faint. ? Watch closely for changes in your health, and be sure to contact your doctor if: 
? · You are not getting better after 1 day (24 hours). Where can you learn more? Go to http://anna-sanam.info/. Enter N706 in the search box to learn more about \"Abdominal Pain: Care Instructions. \" Current as of: March 20, 2017 Content Version: 11.4 © 4629-4058 [employer]. Care instructions adapted under license by [employer] (which disclaims liability or warranty for this information). If you have questions about a medical condition or this instruction, always ask your healthcare professional. [place] [address] any warranty or liability for your use of this information. Introducing \[facility]\"" & HEALTH SERVICES! [facility] introduces [employer] patient portal. Now you can access parts of your medical record, email your doctor's office, and request medication refills online. 1. In your internet browser, go to [email or web address]. [employer] 2. Click on the First Time User? Click Here link in the Sign In box. You will see the New Member Sign Up page. 3. Enter your [employer] Access Code exactly as it appears below. You will not need to use this code after youve completed the sign-up process. If you do not sign up before the expiration date, you must request a new code. · [employer] Access Code: [email or web address] Expires: 6/25/2018  5:44 PM 
 
4. Enter the last four digits of your Social Security Number (xxxx) and Date of Birth (mm/dd/yyyy) as indicated and click Submit. You will be taken to the next sign-up page. 5. Create a [employer] ID. This will be your [employer] login ID and cannot be changed, so think of one that is secure and easy to remember. 6. Create a Z[employer]ican password. You can change your password at any time. 7. Enter your Password Reset Question and Answer. This can be used at a later time if you forget your password. 8. Enter your e-mail address. You will receive e-mail notification when new information is available in [address] 9. Click Sign Up. You can now view and download portions of your medical record. 10. Click the Download Summary menu link to download a portable copy of your medical information. If you have questions, please visit the Frequently Asked Questions section of the [employer] website. Remember, [employer] is NOT to be used for urgent needs. For medical emergencies, dial 911. Now available from your iPhone and Android! Please provide this summary of care documentation to your next provider. Your primary care clinician is listed as Bharat Uribe. If you have any questions after today's visit, please call [phone].

## 2018-03-27 NOTE — PROGRESS NOTES
Pt c/o upper abd pain x1day. Feels \"like a rubber band around my stomach\". Abd TTP. Pt c/o of wooziness x2-3wks; no vomiting. Pt denies constipation or issues w/ BM's. H/o diverticulitis. Had tumor removed from kidney recently.

## 2018-03-27 NOTE — PROGRESS NOTES
HISTORY OF PRESENT ILLNESS  Arely Valentine is a 56 y.o. female. HPI  Chief Complaint   Patient presents with    Abdominal Pain     Pt presents with complaints of abdominal pain for one day. Pain started yesterday and is slightly better today. Pain is to epigastric area and pt describes sensation of a band across upper abdomen. Pain is moderate and rates \"7\" on scale 1-10. Denies any associated fevers, chills, nausea, vomiting, constipation, diarrhea, melena, or bloody stools. Denies any indigestion or reflux but does have occasional belching. Occasionally feels hot/cold but is menopausal and this is not a new symptom. She also states she's been feeling a little \"woozy\" the last couple of weeks. Pt had nephrectomy for renal carcinoma 18, Dr. Krista Moraes. She also has hx of cholecystectomy and diverticulosis. Pt called GI specialist's (Dr. Marianne Carolina) office today to try to get appt but didn't receive call back today. Past Medical History:   Diagnosis Date    Diverticulosis     Hypertension     Renal carcinoma, right ([address]     Sleep apnea        Past Surgical History:   Procedure Laterality Date    HX CERVICAL DISKECTOMY      HX  SECTION   and     HX CHOLECYSTECTOMY      HX CHOLECYSTECTOMY      HX GYN      ablation    HX GYN  2015    hysterectomy    HX ORTHOPAEDIC      Neck Surgery.  HX ORTHOPAEDIC Left     knee surgery x 3    HX OTHER SURGICAL  2016    colon resection    HX ROTATOR CUFF REPAIR Right 2006    HX TUMOR REMOVAL  2018    R Kidney          Review of Systems   Constitutional: Negative for chills, diaphoresis, fever, malaise/fatigue and weight loss. Eyes: Negative. Respiratory: Negative for cough, shortness of breath and wheezing. Cardiovascular: Negative for chest pain, palpitations and leg swelling. Gastrointestinal: Positive for abdominal pain.  Negative for blood in stool, constipation, diarrhea, heartburn, melena, nausea and vomiting. Genitourinary: Negative for dysuria, flank pain, frequency, hematuria and urgency. Musculoskeletal: Negative for back pain, myalgias and neck pain. Skin: Negative for itching and rash. Neurological: Positive for dizziness (\"woozy\"). Negative for tingling, loss of consciousness, weakness and headaches. Physical Exam   Constitutional: She is oriented to person, place, and time. She appears well-developed and well-nourished. No distress. HENT:   Head: Normocephalic and atraumatic. Eyes: Conjunctivae and EOM are normal. Pupils are equal, round, and reactive to light. No scleral icterus. Neck: Normal range of motion. Cardiovascular: Normal rate, regular rhythm and normal heart sounds. Pulmonary/Chest: Effort normal and breath sounds normal. No respiratory distress. Abdominal: Soft. Normal appearance and bowel sounds are normal. She exhibits no distension, no ascites and no mass. There is no hepatosplenomegaly. There is tenderness in the right upper quadrant, epigastric area and periumbilical area. There is no rigidity, no rebound, no guarding and no CVA tenderness. Musculoskeletal: Normal range of motion. Neurological: She is alert and oriented to person, place, and time. She has normal reflexes. No cranial nerve deficit. Coordination normal.   Skin: Skin is warm and dry. No rash noted. She is not diaphoretic.      Results for orders placed or performed in visit on 03/27/18   AMB POC URINALYSIS DIP STICK AUTO W/O MICRO   Result Value Ref Range    Color (UA POC) Yellow     Clarity (UA POC) Clear     Glucose (UA POC) Negative Negative    Bilirubin (UA POC) Negative Negative    Ketones (UA POC) Negative Negative    Specific gravity (UA POC) 1.015 1.001 - 1.035    Blood (UA POC) Negative Negative    pH (UA POC) 5.5 4.6 - 8.0    Protein (UA POC) Negative Negative    Urobilinogen (UA POC) 0.2 mg/dL 0.2 - 1    Nitrites (UA POC) Negative Negative    Leukocyte esterase (UA POC) Negative Negative       ASSESSMENT and PLAN    ICD-10-CM ICD-9-CM    1. Epigastric abdominal pain R10.13 789.06 AMB POC URINALYSIS DIP STICK AUTO W/O MICRO      CBC WITH AUTOMATED DIFF      METABOLIC PANEL, COMPREHENSIVE      LIPASE      CT ABD PELV W CONT   2. RUQ abdominal pain R10.11 789.01 AMB POC URINALYSIS DIP STICK AUTO W/O MICRO      CBC WITH AUTOMATED DIFF      METABOLIC PANEL, COMPREHENSIVE      LIPASE      CT ABD PELV W CONT   3. Periumbilical pain V13.93 363.23 CBC WITH AUTOMATED DIFF      METABOLIC PANEL, COMPREHENSIVE      LIPASE      CT ABD PELV W CONT   4. S/p nephrectomy Z90.5 V45.73 CBC WITH AUTOMATED DIFF      METABOLIC PANEL, COMPREHENSIVE      LIPASE      CT ABD PELV W CONT   Discussed w/ pt ED work-up but pt doesn't wish to go to ED. Stat labs and CT (obtained prior auth but will have to be scheduled in am). Suspect gastritis or GERD but need to r/o other underlying serious etiology. Isaac Barajas NP  This note will not be viewable in [address]

## 2018-03-28 ENCOUNTER — HOSPITAL ENCOUNTER (OUTPATIENT)
Dept: CT IMAGING | Age: 58
Discharge: HOME | End: 2018-03-28
Attending: NURSE PRACTITIONER
Payer: COMMERCIAL

## 2018-03-28 DIAGNOSIS — R10.13 EPIGASTRIC ABDOMINAL PAIN: ICD-10-CM

## 2018-03-28 DIAGNOSIS — R10.33 PERIUMBILICAL PAIN: ICD-10-CM

## 2018-03-28 DIAGNOSIS — Z90.5 S/P NEPHRECTOMY: ICD-10-CM

## 2018-03-28 DIAGNOSIS — R10.11 RUQ ABDOMINAL PAIN: ICD-10-CM

## 2018-03-28 LAB — CREAT BLD-MCNC: 1.1 MG/DL (ref 0.6–1.3)

## 2018-03-28 PROCEDURE — 74011636320 HC RX REV CODE- 636/320: Performed by: RADIOLOGY

## 2018-03-28 PROCEDURE — 74011250636 HC RX REV CODE- 250/636: Performed by: RADIOLOGY

## 2018-03-28 PROCEDURE — 74177 CT ABD & PELVIS W/CONTRAST: CPT

## 2018-03-28 PROCEDURE — 82565 ASSAY OF CREATININE: CPT

## 2018-03-28 RX ORDER — SODIUM CHLORIDE 9 MG/ML
50 INJECTION, SOLUTION INTRAVENOUS
Status: COMPLETED | OUTPATIENT
Start: 2018-03-28 | End: 2018-03-28

## 2018-03-28 RX ORDER — SODIUM CHLORIDE 0.9 % (FLUSH) 0.9 %
10 SYRINGE (ML) INJECTION
Status: COMPLETED | OUTPATIENT
Start: 2018-03-28 | End: 2018-03-28

## 2018-03-28 RX ADMIN — IOPAMIDOL 100 ML: 755 INJECTION, SOLUTION INTRAVENOUS at 10:12

## 2018-03-28 RX ADMIN — IOHEXOL 40 ML: 240 INJECTION, SOLUTION INTRATHECAL; INTRAVASCULAR; INTRAVENOUS; ORAL at 10:13

## 2018-03-28 RX ADMIN — SODIUM CHLORIDE 50 ML/HR: 900 INJECTION, SOLUTION INTRAVENOUS at 10:13

## 2018-03-28 RX ADMIN — Medication 10 ML: at 10:13

## 2018-03-28 NOTE — PROGRESS NOTES
Called pt and discussed results. Her abdominal pain is a little better today. I suggested she try Zantac or Pepcid 1-2 times daily to see if that helps her pain.   She has appt to see her GI specialist NP 4/12 and was encouraged to f/u with GI.

## 2018-08-17 ENCOUNTER — HOSPITAL ENCOUNTER (OUTPATIENT)
Dept: GENERAL RADIOLOGY | Age: 58
Discharge: HOME | End: 2018-08-17
Payer: COMMERCIAL

## 2018-08-17 DIAGNOSIS — Z90.5 STATUS POST NEPHRECTOMY: ICD-10-CM

## 2018-08-17 PROCEDURE — 71046 X-RAY EXAM CHEST 2 VIEWS: CPT

## 2018-09-28 ENCOUNTER — OFFICE VISIT (OUTPATIENT)
Dept: PRIMARY CARE CLINIC | Age: 58
End: 2018-09-28

## 2018-09-28 VITALS
HEIGHT: 63 IN | TEMPERATURE: 98.3 F | DIASTOLIC BLOOD PRESSURE: 78 MMHG | HEART RATE: 68 BPM | SYSTOLIC BLOOD PRESSURE: 107 MMHG | BODY MASS INDEX: 36.14 KG/M2 | RESPIRATION RATE: 17 BRPM | OXYGEN SATURATION: 99 % | WEIGHT: 204 LBS

## 2018-09-28 DIAGNOSIS — R29.898 NECK TIGHTNESS: ICD-10-CM

## 2018-09-28 DIAGNOSIS — R20.2 TINGLING OF SKIN: ICD-10-CM

## 2018-09-28 DIAGNOSIS — J34.89 SINUS PRESSURE: Primary | ICD-10-CM

## 2018-09-28 PROBLEM — E66.01 SEVERE OBESITY (BMI 35.0-39.9): Status: ACTIVE | Noted: 2018-09-28

## 2018-09-28 RX ORDER — OMEPRAZOLE 40 MG/1
CAPSULE, DELAYED RELEASE ORAL
COMMUNITY
Start: 2018-09-18 | End: 2020-08-07 | Stop reason: SDUPTHER

## 2018-09-28 RX ORDER — SODIUM PICOSULFATE, MAGNESIUM OXIDE, AND ANHYDROUS CITRIC ACID 10; 3.5; 12 MG/160ML; G/160ML; G/160ML
LIQUID ORAL
Refills: 0 | COMMUNITY
Start: 2018-09-10 | End: 2019-05-20 | Stop reason: ALTCHOICE

## 2018-09-28 RX ORDER — RANITIDINE 150 MG/1
TABLET, FILM COATED ORAL
Refills: 3 | COMMUNITY
Start: 2018-09-17 | End: 2020-08-07

## 2018-09-28 RX ORDER — CYCLOBENZAPRINE HCL 10 MG
TABLET ORAL
COMMUNITY
Start: 2017-09-28 | End: 2018-09-28 | Stop reason: SDUPTHER

## 2018-09-28 RX ORDER — CYCLOBENZAPRINE HCL 10 MG
10 TABLET ORAL
Qty: 20 TAB | Refills: 0 | Status: SHIPPED | OUTPATIENT
Start: 2018-09-28 | End: 2018-10-24 | Stop reason: SDUPTHER

## 2018-09-28 RX ORDER — PREDNISONE 20 MG/1
TABLET ORAL
COMMUNITY
Start: 2017-09-28 | End: 2019-05-20 | Stop reason: ALTCHOICE

## 2018-09-28 RX ORDER — GABAPENTIN 100 MG/1
100 CAPSULE ORAL
COMMUNITY
Start: 2017-09-29 | End: 2020-08-07

## 2018-09-28 RX ORDER — LORAZEPAM 1 MG/1
TABLET ORAL
COMMUNITY
Start: 2018-01-11 | End: 2020-08-07 | Stop reason: ALTCHOICE

## 2018-09-28 NOTE — PATIENT INSTRUCTIONS
Saline Nasal Washes: Care Instructions Your Care Instructions Saline nasal washes help keep the nasal passages open by washing out thick or dried mucus. This simple remedy can help relieve symptoms of allergies, sinusitis, and colds. It also can make the nose feel more comfortable by keeping the mucous membranes moist. You may notice a little burning sensation in your nose the first few times you use the solution, but this usually gets better in a few days. Follow-up care is a key part of your treatment and safety. Be sure to make and go to all appointments, and call your doctor if you are having problems. It's also a good idea to know your test results and keep a list of the medicines you take. How can you care for yourself at home? · You can buy premixed saline solution in a squeeze bottle or other sinus rinse products at a drugstore. Read and follow the instructions on the label. · You also can make your own saline solution by adding 1 teaspoon of salt and 1 teaspoon of baking soda to 2 cups of distilled water. · If you use a homemade solution, pour a small amount into a clean bowl. Using a rubber bulb syringe, squeeze the syringe and place the tip in the salt water. Pull a small amount of the salt water into the syringe by relaxing your hand. · Sit down with your head tilted slightly back. Do not lie down. Put the tip of the bulb syringe or the squeeze bottle a little way into one of your nostrils. Gently drip or squirt a few drops into the nostril. Repeat with the other nostril. Some sneezing and gagging are normal at first. 
· Gently blow your nose. · Wipe the syringe or bottle tip clean after each use. · Repeat this 2 or 3 times a day. · Use nasal washes gently if you have nosebleeds often. When should you call for help? Watch closely for changes in your health, and be sure to contact your doctor if: 
  · You often get nosebleeds.  
  · You have problems doing the nasal washes. Where can you learn more? Go to http://anna-sanam.info/. Enter [id number] in the search box to learn more about \"Saline Nasal Washes: Care Instructions. \" Current as of: May 12, 2017 Content Version: 11.7 © 6044-6535 [employer]. Care instructions adapted under license by [employer] (which disclaims liability or warranty for this information). If you have questions about a medical condition or this instruction, always ask your healthcare professional. [address] any warranty or liability for your use of this information.

## 2018-09-28 NOTE — PROGRESS NOTES
HISTORY OF PRESENT ILLNESS Néstor Mahoney is a 66 y.o. female. HPI Chief Complaint Patient presents with  
 Headache Pt presents with complaints of headache. Can't tell what's causing the pain. Mild left ear pain and left neck pain. Also has pins and needles sensation to left scalp. She has tried ibuprofen and migraine otc meds which offer temporary relief. However, headaches come back. Pt admits to being under a lot of stress/tension as this time. Denies history of headaches and doesn't typically have headaches. Denies any fevers, chills, cough, congestion, drainage, N/V/D. Past Medical History:  
Diagnosis Date  Diverticulosis  Hypertension  Renal carcinoma, right ([address]  Sleep apnea Past Surgical History:  
Procedure Laterality Date  HX CERVICAL DISKECTOMY  2003 [address]  HX CHOLECYSTECTOMY  HX GYN    
 ablation [facility]  1  
 hysterectomy  HX ORTHOPAEDIC Neck Surgery.  HX ORTHOPAEDIC Left   
 knee surgery x 3  
 HX OTHER SURGICAL  11/2016  
 colon resection  HX ROTATOR CUFF REPAIR Right 2006  HX TUMOR REMOVAL  01/2018 R Kidney No Known Allergies ROS A comprehensive review of systems was obtained and negative except findings in the HPI Physical Exam  
Constitutional: She appears well-developed and well-nourished. No distress. HENT:  
Head: Normocephalic and atraumatic. Right Ear: Tympanic membrane, external ear and ear canal normal.  
Left Ear: Tympanic membrane, external ear and ear canal normal.  
Nose: Nose normal.  
Mouth/Throat: Oropharynx is clear and moist.  
Scalp - no rash seen, non-tender. No sinus tenderness Eyes: Conjunctivae and EOM are normal. Pupils are equal, round, and reactive to light. Neck: Normal range of motion. Neck supple. Cardiovascular: Normal rate, regular rhythm and normal heart sounds. Pulmonary/Chest: Effort normal and breath sounds normal.  
Lymphadenopathy:  
  She has no cervical adenopathy. Skin: She is not diaphoretic. ASSESSMENT and PLAN 
  ICD-10-CM ICD-9-CM 1. Sinus pressure J34.89 478.19   
2. Neck tightness R29.898 723.9 3. Tingling of skin R20.2 782.0 Orders Placed This Encounter  cyclobenzaprine (FLEXERIL) 10 mg tablet Recommend tylenol and limited use of NSAID's prn headaches. Monitor skin for any rash and return asap. See PCP if tingling persists or worsens. Rachna Álvarez, NP This note will not be viewable in [address] Follow-up with PCP if persistent sx's.

## 2018-09-28 NOTE — PROGRESS NOTES
Chief Complaint Patient presents with  
 Headache  
pt c/o sharp pain in headache that radiates to her neck and L ear, pt states she took otc naproxen and excedrin to help with discomfort, pt denies any hx of headaches. denies any LOC and vision. This note will not be viewable in [address]

## 2018-09-28 NOTE — MR AVS SNAPSHOT
[address] 
[phone] Patient: Néstor Mahoney MRN: [id number] KCB:[birth year] Visit Information Date & Time Provider Department Dept. Phone Encounter #  
 9/28/2018 10:15 AM Nova Brotehrs NP [address] [address] [phone] [id number] Follow-up Instructions Return if symptoms worsen or fail to improve. Upcoming Health Maintenance Date Due Hepatitis C Screening 1960 PAP AKA CERVICAL CYTOLOGY 5/8/1983 Shingrix Vaccine Age 50> (1 of 2) 5/8/2012 FOBT Q 1 YEAR AGE 50-75 5/8/2012 BREAST CANCER SCRN MAMMOGRAM 11/4/2018 Influenza Age 5 to Adult 3/31/2019* DTaP/Tdap/Td series (2 - Td) 7/28/2023 *Topic was postponed. The date shown is not the original due date. Allergies as of 9/28/2018  Review Complete On: 9/28/2018 By: Nova Brothers NP No Known Allergies Current Immunizations  Never Reviewed Name Date Tdap 7/28/2013  2:34 PM  
  
 Not reviewed this visit You Were Diagnosed With   
  
 Codes Comments Sinus pressure    -  Primary ICD-10-CM: O26.98 ICD-9-CM: 478.19 Neck tightness     ICD-10-CM: R29.898 ICD-9-CM: 723.9 Tingling of skin     ICD-10-CM: R20.2 ICD-9-CM: 689. 0 Vitals BP Pulse Temp Resp Height(growth percentile) Weight(growth percentile) 107/78 (BP 1 Location: Left arm, BP Patient Position: Sitting) 68 98.3 °F (36.8 °C) (Oral) 17 5' 3\" (1.6 m) 204 lb (92.5 kg) LMP SpO2 BMI OB Status Smoking Status 08/26/2014 99% 36.14 kg/m2 Hysterectomy Never Smoker BMI and BSA Data Body Mass Index Body Surface Area  
 36.14 kg/m 2 2.03 m 2 Preferred Pharmacy [facility] Name Phone [id number] [phone] Your Updated Medication List  
  
   
 This list is accurate as of 9/28/18 10:44 AM.  Always use your most recent med list.  
  
  
  
  
 CLENPIQ 10 mg-3.5 gram -12 gram/160 mL Soln Generic drug:  sod picosulf-mag ox-citric ac  
TAKE AS DIRECTED FOR COLONOSCOPY  
  
 cyclobenzaprine 10 mg tablet Commonly known as:  FLEXERIL Take 1 Tab by mouth nightly as needed for Muscle Spasm(s). gabapentin 100 mg capsule Commonly known as:  NEURONTIN Take 100 mg by mouth. hydroCHLOROthiazide 25 mg tablet Commonly known as:  HYDRODIURIL Take 1/2 tablet by mouth once a day LORazepam 1 mg tablet Commonly known as:  ATIVAN Take 1 tablet 2 hours prior to procedure then may take 1 tablet 1 hour prior to procedure if needed. omeprazole 40 mg capsule Commonly known as:  PRILOSEC  
  
 predniSONE 20 mg tablet Commonly known as:  DELTASONE  
  
 PROBIOTIC 4X 10-15 mg Tbec Generic drug:  B.infantis-B.ani-B.long-B.bifi Take 10-15 mg by mouth daily. raNITIdine 150 mg tablet Commonly known as:  ZANTAC TAKE 1 TABLET BY MOUTH EVERY DAY AT NIGHT Prescriptions Sent to Pharmacy Refills  
 cyclobenzaprine (FLEXERIL) 10 mg tablet 0 Sig: Take 1 Tab by mouth nightly as needed for Muscle Spasm(s). Class: Normal  
 Pharmacy: [facility] #: [phone] Route: Oral  
  
Follow-up Instructions Return if symptoms worsen or fail to improve. Patient Instructions Saline Nasal Washes: Care Instructions Your Care Instructions Saline nasal washes help keep the nasal passages open by washing out thick or dried mucus. This simple remedy can help relieve symptoms of allergies, sinusitis, and colds.  It also can make the nose feel more comfortable by keeping the mucous membranes moist. You may notice a little burning sensation in your nose the first few times you use the solution, but this usually gets better in a few days. Follow-up care is a key part of your treatment and safety. Be sure to make and go to all appointments, and call your doctor if you are having problems. It's also a good idea to know your test results and keep a list of the medicines you take. How can you care for yourself at home? · You can buy premixed saline solution in a squeeze bottle or other sinus rinse products at a drugstore. Read and follow the instructions on the label. · You also can make your own saline solution by adding 1 teaspoon of salt and 1 teaspoon of baking soda to 2 cups of distilled water. · If you use a homemade solution, pour a small amount into a clean bowl. Using a rubber bulb syringe, squeeze the syringe and place the tip in the salt water. Pull a small amount of the salt water into the syringe by relaxing your hand. · Sit down with your head tilted slightly back. Do not lie down. Put the tip of the bulb syringe or the squeeze bottle a little way into one of your nostrils. Gently drip or squirt a few drops into the nostril. Repeat with the other nostril. Some sneezing and gagging are normal at first. 
· Gently blow your nose. · Wipe the syringe or bottle tip clean after each use. · Repeat this 2 or 3 times a day. · Use nasal washes gently if you have nosebleeds often. When should you call for help? Watch closely for changes in your health, and be sure to contact your doctor if: 
  · You often get nosebleeds.  
  · You have problems doing the nasal washes. Where can you learn more? Go to http://anna-sanam.info/. Enter [phone] in the search box to learn more about \"Saline Nasal Washes: Care Instructions. \" Current as of: May 12, 2017 Content Version: 11.7 © 0733-7780 [employer]. Care instructions adapted under license by [employer] (which disclaims liability or warranty for this information).  If you have questions about a medical condition or this instruction, always ask your healthcare professional. [address] [address] any warranty or liability for your use of this information. Introducing [facility]! [employer] introduces [employer]oSurround patient portal. Now you can access parts of your medical record, email your doctor's office, and request medication refills online. 1. In your internet browser, go to [email or web address]". [email or web address]/"[email or web address]t 2. Click on the First Time User? Click Here link in the Sign In box. You will see the New Member Sign Up page. 3. Enter your [employer] Access Code exactly as it appears below. You will not need to use this code after youve completed the sign-up process. If you do not sign up before the expiration date, you must request a new code. · [employer] Access Code: [email or web address] Expires: 11/15/2018 10:49 AM 
 
4. Enter the last four digits of your Social Security Number (xxxx) and Date of Birth (mm/dd/yyyy) as indicated and click Submit. You will be taken to the next sign-up page. 5. Create a [employer] ID. This will be your [employer] login ID and cannot be changed, so think of one that is secure and easy to remember. 6. Create a [employer] password. You can change your password at any time. 7. Enter your Password Reset Question and Answer. This can be used at a later time if you forget your password. 8. Enter your e-mail address. You will receive e-mail notification when new information is available in [address] 9. Click Sign Up. You can now view and download portions of your medical record. 10. Click the Download Summary menu link to download a portable copy of your medical information. If you have questions, please visit the Frequently Asked Questions section of the [employer] website. Remember, [employer] is NOT to be used for urgent needs. For medical emergencies, dial 911. Now available from your iPhone and Android! Please provide this summary of care documentation to your next provider. Your primary care clinician is listed as Bharat Uribe. If you have any questions after today's visit, please call [phone].

## 2018-10-24 RX ORDER — CYCLOBENZAPRINE HCL 10 MG
10 TABLET ORAL
Qty: 20 TAB | Refills: 0 | Status: SHIPPED | OUTPATIENT
Start: 2018-10-24 | End: 2018-12-02 | Stop reason: SDUPTHER

## 2018-12-04 RX ORDER — CYCLOBENZAPRINE HCL 10 MG
10 TABLET ORAL
Qty: 20 TAB | Refills: 0 | Status: SHIPPED | OUTPATIENT
Start: 2018-12-04 | End: 2020-08-07

## 2019-01-21 ENCOUNTER — HOSPITAL ENCOUNTER (OUTPATIENT)
Dept: GENERAL RADIOLOGY | Age: 59
Discharge: HOME | End: 2019-01-21
Payer: COMMERCIAL

## 2019-01-21 DIAGNOSIS — R52 PAIN: ICD-10-CM

## 2019-01-21 PROCEDURE — 74018 RADEX ABDOMEN 1 VIEW: CPT

## 2019-02-27 ENCOUNTER — OFFICE VISIT (OUTPATIENT)
Dept: SURGERY | Age: 59
End: 2019-02-27

## 2019-02-27 VITALS
WEIGHT: 208 LBS | HEART RATE: 84 BPM | TEMPERATURE: 97.9 F | HEIGHT: 63 IN | BODY MASS INDEX: 36.86 KG/M2 | DIASTOLIC BLOOD PRESSURE: 79 MMHG | RESPIRATION RATE: 20 BRPM | SYSTOLIC BLOOD PRESSURE: 116 MMHG | OXYGEN SATURATION: 96 %

## 2019-02-27 DIAGNOSIS — K43.2 INCISIONAL HERNIA, WITHOUT OBSTRUCTION OR GANGRENE: Primary | ICD-10-CM

## 2019-02-27 DIAGNOSIS — E66.01 SEVERE OBESITY WITH BODY MASS INDEX (BMI) OF 35.0 TO 39.9 WITH SERIOUS COMORBIDITY (HCC): ICD-10-CM

## 2019-02-27 NOTE — PROGRESS NOTES
Chief Complaint   Patient presents with    Possible Hernia     seen at the request of Dr. Brittney whitlock umbilical hernia      1. Have you been to the ER, urgent care clinic since your last visit? Hospitalized since your last visit? No    2. Have you seen or consulted any other health care providers outside of the [address] [place] [address] since your last visit? Include any pap smears or colon screening.  No

## 2019-02-27 NOTE — PROGRESS NOTES
HISTORY OF PRESENT ILLNESS  Deja Muller is a 66 y.o. female who comes in for consultation by Sharda aRmos and Dr Melisa Patrick for abdominal pain and a hernia  Abdominal Pain   Associated symptoms include abdominal pain. Pertinent negatives include no chest pain, no headaches and no shortness of breath. She has been having intermittent epigastric/suprumbilical pain radiating to both sides and RUQ for several months. CT was negative except a hernia. She has had a hx recurrent diverticulitis culminating in a sigmoid colectomy in ,  She also had a right nephrectomy and a cholecystectomy. She reports some intermittent nausea as well but denies vomiting, diarrhea, constipation, melena, hematochezia, dysuria or hematuria. Past Medical History:   Diagnosis Date    Chronic kidney disease     Diverticulosis     GERD (gastroesophageal reflux disease)     Hypertension     Renal carcinoma, right ([address]     kidney    Sleep apnea      Past Surgical History:   Procedure Laterality Date    HX CERVICAL DISKECTOMY      HX  SECTION   and     HX CHOLECYSTECTOMY      HX CHOLECYSTECTOMY      HX GYN      ablation    HX GYN  2015    hysterectomy    HX ORTHOPAEDIC      Neck Surgery.  HX ORTHOPAEDIC Left     knee surgery x 3    HX OTHER SURGICAL  2016    colon resection    HX ROTATOR CUFF REPAIR Right 2006    HX TUMOR REMOVAL  2018    R Kidney      Family History   Problem Relation Age of Onset    Hypertension Mother     Hypertension Father     Diabetes Sister     Anesth Problems Neg Hx      Social History     Tobacco Use    Smoking status: Never Smoker    Smokeless tobacco: Never Used   Substance Use Topics    Alcohol use: Yes     Comment: OCC    Drug use: No     Current Outpatient Medications   Medication Sig    psyllium seed, with dextrose, (FIBER PO) fiber    grape seed extract (GRAPE SEED PO) Take  by mouth.     raNITIdine (ZANTAC) 150 mg tablet TAKE 1 TABLET BY MOUTH EVERY DAY AT NIGHT    hydroCHLOROthiazide (HYDRODIURIL) 25 mg tablet Take 1/2 tablet by mouth once a day    B.infantis-B.ani-B.long-B.bifi (PROBIOTIC 4X) 10-15 mg TbEC Take 10-15 mg by mouth daily.  cyclobenzaprine (FLEXERIL) 10 mg tablet TAKE 1 TAB BY MOUTH NIGHTLY AS NEEDED FOR MUSCLE SPASM(S).  LORazepam (ATIVAN) 1 mg tablet Take 1 tablet 2 hours prior to procedure then may take 1 tablet 1 hour prior to procedure if needed.  predniSONE (DELTASONE) 20 mg tablet     gabapentin (NEURONTIN) 100 mg capsule Take 100 mg by mouth.  CLENPIQ 10 mg-3.5 gram -12 gram/160 mL soln TAKE AS DIRECTED FOR COLONOSCOPY    omeprazole (PRILOSEC) 40 mg capsule      No current facility-administered medications for this visit. No Known Allergies    Review of Systems   Constitutional: Negative for chills, diaphoresis, fever, malaise/fatigue and weight loss. HENT: Negative for congestion, ear pain and sore throat. Eyes: Negative for blurred vision and pain. Respiratory: Negative for cough, hemoptysis, sputum production, shortness of breath, wheezing and stridor. Cardiovascular: Negative for chest pain, palpitations, orthopnea, claudication, leg swelling and PND. Gastrointestinal: Positive for abdominal pain. Negative for blood in stool, constipation, diarrhea, heartburn, melena, nausea and vomiting. Genitourinary: Negative for dysuria, flank pain, frequency, hematuria and urgency. Musculoskeletal: Negative for back pain, joint pain, myalgias and neck pain. Skin: Negative for itching and rash. Neurological: Negative for dizziness, tremors, focal weakness, seizures, weakness and headaches. Endo/Heme/Allergies: Negative for polydipsia. Psychiatric/Behavioral: Positive for depression. Negative for memory loss. The patient is not nervous/anxious.       Visit Vitals  /79 (BP 1 Location: Right arm, BP Patient Position: Sitting)   Pulse 84   Temp 97.9 °F (36.6 °C)   Resp 20   Ht 5' 3\" (1.6 m)   Wt 94.3 kg (208 lb)   LMP 08/26/2014   SpO2 96%   BMI 36.85 kg/m²       Physical Exam   Constitutional: She is oriented to person, place, and time. She appears well-developed and well-nourished. No distress. HENT:   Head: Normocephalic and atraumatic. Mouth/Throat: Oropharynx is clear and moist. No oropharyngeal exudate. Thrush in mouth   Eyes: Conjunctivae and EOM are normal. Pupils are equal, round, and reactive to light. No scleral icterus. Neck: Normal range of motion. Neck supple. No JVD present. No tracheal deviation present. No thyromegaly present. Cardiovascular: Normal rate and regular rhythm. Exam reveals no gallop and no friction rub. No murmur heard. Pulmonary/Chest: Effort normal and breath sounds normal. No respiratory distress. She has no wheezes. She has no rales. Abdominal: Soft. Bowel sounds are normal. She exhibits no distension and no mass. There is no tenderness. There is no rigidity, no rebound, no guarding, no tenderness at McBurney's point and negative Peralta's sign. A hernia (reducible incisional hernia) is present. Hernia confirmed negative in the ventral area. Musculoskeletal: Normal range of motion. She exhibits no edema. Lymphadenopathy:     She has no cervical adenopathy. Neurological: She is alert and oriented to person, place, and time. No cranial nerve deficit. Skin: Skin is warm and dry. No rash noted. She is not diaphoretic. No erythema. No pallor. Psychiatric: She has a normal mood and affect. Her behavior is normal. Judgment and thought content normal.       ASSESSMENT and PLAN  1. Incisional hernia. I explained about the anatomy and pathophysiology of hernias and the risk of incarceration and strangulation of the bowel. I explained about hernia repairs (open with and without mesh, and robotic assisted and laparoscopic with mesh).   I explained the risks and benefits of repair including bleeding, infection, chronic pain, bowel or bladder injury, hernia recurrence, seroma, mesh infection requiring removal.  I explained it would be a six to eight week recuperation with no driving for 5 - 7 days, no lifting for six weeks. 2.  Abdominal pain ? ?secondary to #1 vs scar tissue    3. Obesity Body mass index is 36.85 kg/m². recommended weight loss with diet modification and exercise  4.   Depression stable on rx    She is interested in pursuing a robotic assisted incisional hernia repair with mesh and possible lysis of adhesions under general anesthesia as an outpatient with an overnight stay but is leaning to wait until late summer  She will likely miss 3-6 weeks from work  She will need a preop bowel prep    Imer Donaldson MD FACS

## 2019-05-20 ENCOUNTER — OFFICE VISIT (OUTPATIENT)
Dept: PRIMARY CARE CLINIC | Age: 59
End: 2019-05-20

## 2019-05-20 VITALS
BODY MASS INDEX: 37.39 KG/M2 | OXYGEN SATURATION: 97 % | TEMPERATURE: 97 F | SYSTOLIC BLOOD PRESSURE: 118 MMHG | RESPIRATION RATE: 18 BRPM | HEIGHT: 63 IN | HEART RATE: 79 BPM | WEIGHT: 211 LBS | DIASTOLIC BLOOD PRESSURE: 84 MMHG

## 2019-05-20 DIAGNOSIS — J06.9 UPPER RESPIRATORY TRACT INFECTION, UNSPECIFIED TYPE: Primary | ICD-10-CM

## 2019-05-20 DIAGNOSIS — H65.92 LEFT NON-SUPPURATIVE OTITIS MEDIA: ICD-10-CM

## 2019-05-20 RX ORDER — AMOXICILLIN AND CLAVULANATE POTASSIUM 875; 125 MG/1; MG/1
1 TABLET, FILM COATED ORAL EVERY 12 HOURS
Qty: 20 TAB | Refills: 0 | Status: SHIPPED | OUTPATIENT
Start: 2019-05-20 | End: 2019-11-11 | Stop reason: ALTCHOICE

## 2019-05-20 NOTE — PROGRESS NOTES
Subjective:      Micaela Hebert is a 64 y.o. female who presents for possible ear infection. Symptoms include left ear pain, congestion and sore throat. Onset of symptoms was 5 days ago, gradually worsening since that time. Associated symptoms include left ear pressure/pain, achiness and congestion, which have been present for 5 days . She is drinking plenty of fluids. Problem List:     Patient Active Problem List    Diagnosis Date Noted    Incisional hernia, without obstruction or gangrene 02/27/2019    Severe obesity with body mass index (BMI) of 35.0 to 39.9 with serious comorbidity ([address] 09/28/2018    Diverticulitis 11/28/2016    Diverticulitis of large intestine with perforation without bleeding 06/08/2016     Medical History:     Past Medical History:   Diagnosis Date    Chronic kidney disease     Diverticulosis     GERD (gastroesophageal reflux disease)     Hypertension     Renal carcinoma, right (HCC)     kidney    Sleep apnea      Allergies:   No Known Allergies   Medications:     Current Outpatient Medications   Medication Sig    amoxicillin-clavulanate (AUGMENTIN) 875-125 mg per tablet Take 1 Tab by mouth every twelve (12) hours.  psyllium seed, with dextrose, (FIBER PO) fiber    grape seed extract (GRAPE SEED PO) Take  by mouth.  cyclobenzaprine (FLEXERIL) 10 mg tablet TAKE 1 TAB BY MOUTH NIGHTLY AS NEEDED FOR MUSCLE SPASM(S).  raNITIdine (ZANTAC) 150 mg tablet TAKE 1 TABLET BY MOUTH EVERY DAY AT NIGHT    hydroCHLOROthiazide (HYDRODIURIL) 25 mg tablet Take 1/2 tablet by mouth once a day    B.infantis-B.ani-B.long-B.bifi (PROBIOTIC 4X) 10-15 mg TbEC Take 10-15 mg by mouth daily.  LORazepam (ATIVAN) 1 mg tablet Take 1 tablet 2 hours prior to procedure then may take 1 tablet 1 hour prior to procedure if needed.  gabapentin (NEURONTIN) 100 mg capsule Take 100 mg by mouth.  omeprazole (PRILOSEC) 40 mg capsule      No current facility-administered medications for this visit. Surgical History:     Past Surgical History:   Procedure Laterality Date    HX CERVICAL DISKECTOMY  2003    HX  SECTION   and     HX CHOLECYSTECTOMY      HX CHOLECYSTECTOMY      HX GYN      ablation    HX GYN  2015    hysterectomy    HX ORTHOPAEDIC      Neck Surgery.  HX ORTHOPAEDIC Left     knee surgery x 3    HX OTHER SURGICAL  2016    colon resection    HX ROTATOR CUFF REPAIR Right 2006    HX TUMOR REMOVAL  2018    R Kidney      Social History:     Social History     Socioeconomic History    Marital status:      Spouse name: Not on file    Number of children: Not on file    Years of education: Not on file    Highest education level: Not on file   Tobacco Use    Smoking status: Never Smoker    Smokeless tobacco: Never Used   Substance and Sexual Activity    Alcohol use: Yes     Comment: [id number]    Drug use: No         Objective:     ROS:   Feeling well. No dyspnea or chest pain on exertion. No abdominal pain, change in bowel habits, black or bloody stools. No urinary tract symptoms. GYN ROS: normal menses, no abnormal bleeding, pelvic pain or discharge, no breast pain or new or enlarging lumps on self exam. No neurological complaints. OBJECTIVE:   The patient appears well, alert, oriented x 3, in no distress. Visit Vitals  /84 (BP 1 Location: Left arm, BP Patient Position: Sitting)   Pulse 79   Temp 97 °F (36.1 °C) (Oral)   Resp 18   Ht 5' 3\" (1.6 m)   Wt 211 lb (95.7 kg)   LMP 2014   SpO2 97%   BMI 37.38 kg/m²     HEENT:ENT right normal.Left TM red, bulging. Neck supple. No adenopathy or thyromegaly. FAM. Chest: Lungs are clear, good air entry, no wheezes, rhonchi or rales. Cardiovascular: S1 and S2 normal, no murmurs, regular rate and rhythm. Abdomen: soft without tenderness, guarding, mass or organomegaly. Extremities: show no edema, normal peripheral pulses. Neurological: is normal, no focal findings.         Assessment/Plan: ICD-10-CM ICD-9-CM    1. Upper respiratory tract infection, unspecified type J06.9 465.9 amoxicillin-clavulanate (AUGMENTIN) 875-125 mg per tablet   2. Left non-suppurative otitis media H65.92 381.4 amoxicillin-clavulanate (AUGMENTIN) 875-125 mg per tablet   .

## 2019-05-20 NOTE — PROGRESS NOTES
Chief Complaint   Patient presents with    Cold Symptoms     x5 days. Pt c/o sinus congestion, cough, ear and sore throat. [facility].

## 2019-05-20 NOTE — PATIENT INSTRUCTIONS

## 2019-07-09 ENCOUNTER — HOSPITAL ENCOUNTER (OUTPATIENT)
Dept: GENERAL RADIOLOGY | Age: 59
Discharge: HOME | End: 2019-07-09
Payer: COMMERCIAL

## 2019-07-09 DIAGNOSIS — Z85.528 HX OF RENAL CELL CARCINOMA: ICD-10-CM

## 2019-07-09 PROCEDURE — 71046 X-RAY EXAM CHEST 2 VIEWS: CPT

## 2019-10-03 ENCOUNTER — HOSPITAL ENCOUNTER (OUTPATIENT)
Dept: MRI IMAGING | Age: 59
Discharge: HOME | End: 2019-10-03
Attending: PHYSICAL MEDICINE & REHABILITATION
Payer: COMMERCIAL

## 2019-10-03 DIAGNOSIS — M51.36 DDD (DEGENERATIVE DISC DISEASE), LUMBAR: ICD-10-CM

## 2019-10-03 DIAGNOSIS — M54.16 LUMBAR RADICULOPATHY: ICD-10-CM

## 2019-10-03 DIAGNOSIS — M54.50 LOW BACK PAIN: ICD-10-CM

## 2019-10-03 PROCEDURE — 72148 MRI LUMBAR SPINE W/O DYE: CPT

## 2019-11-11 ENCOUNTER — OFFICE VISIT (OUTPATIENT)
Dept: PRIMARY CARE CLINIC | Age: 59
End: 2019-11-11

## 2019-11-11 VITALS
HEIGHT: 63 IN | OXYGEN SATURATION: 94 % | SYSTOLIC BLOOD PRESSURE: 100 MMHG | WEIGHT: 209 LBS | RESPIRATION RATE: 16 BRPM | BODY MASS INDEX: 37.03 KG/M2 | TEMPERATURE: 98 F | HEART RATE: 92 BPM | DIASTOLIC BLOOD PRESSURE: 69 MMHG

## 2019-11-11 DIAGNOSIS — J01.00 ACUTE MAXILLARY SINUSITIS, RECURRENCE NOT SPECIFIED: Primary | ICD-10-CM

## 2019-11-11 RX ORDER — AMOXICILLIN AND CLAVULANATE POTASSIUM 875; 125 MG/1; MG/1
1 TABLET, FILM COATED ORAL EVERY 12 HOURS
Qty: 20 TAB | Refills: 0 | Status: SHIPPED | OUTPATIENT
Start: 2019-11-11 | End: 2020-08-07 | Stop reason: ALTCHOICE

## 2019-11-11 NOTE — PROGRESS NOTES
HISTORY OF PRESENT ILLNESS  Mallory Willingham is a 64 y.o. female. HPI   This 64year old presents with a 1-2 day hx of pressure in her upper teeth, bilateral maxillary sinus pressure and pain and thickish greenish rhinorrhea. She states she has a hx of frequent sinus infections. No fever    Review of Systems   Constitutional: Negative for fever. HENT: Positive for congestion and sinus pain. Respiratory: Negative for cough. Cardiovascular: Negative for chest pain. Musculoskeletal: Negative for myalgias. Physical Exam   Constitutional: She appears well-developed and well-nourished. No distress. HENT:   Right Ear: External ear normal.   Left Ear: External ear normal.   Nose: Nose normal.   Mouth/Throat: Oropharynx is clear and moist. No oropharyngeal exudate. Bilateral maxillary sinus ttp   Eyes: Pupils are equal, round, and reactive to light. Neck: Normal range of motion. Cardiovascular: Normal rate and regular rhythm. No murmur heard. Pulmonary/Chest: Effort normal and breath sounds normal. No respiratory distress. She has no wheezes. She has no rales. Skin: She is not diaphoretic. ASSESSMENT and PLAN    ICD-10-CM ICD-9-CM    1.  Acute maxillary sinusitis, recurrence not specified J01.00 461.0    will start on augmentin  Precautions given  Follow up if not better

## 2019-11-11 NOTE — PROGRESS NOTES
RM 6    Chief Complaint   Patient presents with    Sinus Pain     sinus pressure , nasal discharge , cough , right ear pain x 2 days       Visit Vitals  /69 (BP 1 Location: Left arm, BP Patient Position: Sitting)   Pulse 92   Temp 98 °F (36.7 °C) (Oral)   Resp 16   Ht 5' 3\" (1.6 m)   Wt 209 lb (94.8 kg)   SpO2 94%   BMI 37.02 kg/m²

## 2019-12-24 ENCOUNTER — HOSPITAL ENCOUNTER (OUTPATIENT)
Dept: GENERAL RADIOLOGY | Age: 59
Discharge: HOME | End: 2019-12-24
Payer: COMMERCIAL

## 2019-12-24 DIAGNOSIS — Z85.528 PERSONAL HISTORY OF RENAL CANCER: ICD-10-CM

## 2019-12-24 PROCEDURE — 71046 X-RAY EXAM CHEST 2 VIEWS: CPT

## 2019-12-24 PROCEDURE — 71048 X-RAY EXAM CHEST 4+ VIEWS: CPT

## 2020-02-06 ENCOUNTER — OFFICE VISIT (OUTPATIENT)
Dept: PRIMARY CARE CLINIC | Age: 60
End: 2020-02-06

## 2020-02-06 VITALS
RESPIRATION RATE: 16 BRPM | DIASTOLIC BLOOD PRESSURE: 80 MMHG | HEART RATE: 94 BPM | WEIGHT: 214 LBS | SYSTOLIC BLOOD PRESSURE: 116 MMHG | OXYGEN SATURATION: 97 % | TEMPERATURE: 98.4 F | BODY MASS INDEX: 37.92 KG/M2 | HEIGHT: 63 IN

## 2020-02-06 DIAGNOSIS — J40 BRONCHITIS: Primary | ICD-10-CM

## 2020-02-06 RX ORDER — DOXYCYCLINE 100 MG/1
100 CAPSULE ORAL 2 TIMES DAILY
Qty: 20 CAP | Refills: 0 | Status: SHIPPED | OUTPATIENT
Start: 2020-02-06 | End: 2020-02-16

## 2020-02-06 RX ORDER — AZELASTINE HCL 205.5 UG/1
SPRAY NASAL
COMMUNITY
Start: 2020-02-06 | End: 2020-08-07

## 2020-02-06 RX ORDER — OMEPRAZOLE 20 MG/1
CAPSULE, DELAYED RELEASE ORAL
COMMUNITY
Start: 2019-12-10 | End: 2021-01-05

## 2020-02-06 RX ORDER — METHYLPREDNISOLONE 4 MG/1
TABLET ORAL
Qty: 1 DOSE PACK | Refills: 0 | Status: SHIPPED | OUTPATIENT
Start: 2020-02-06 | End: 2020-08-07

## 2020-02-06 RX ORDER — BUPROPION HYDROCHLORIDE 150 MG/1
150 TABLET, EXTENDED RELEASE ORAL DAILY
COMMUNITY
Start: 2019-10-29 | End: 2021-04-19 | Stop reason: SDUPTHER

## 2020-02-06 RX ORDER — FAMOTIDINE 40 MG/1
40 TABLET, FILM COATED ORAL 2 TIMES DAILY
COMMUNITY
Start: 2019-12-22 | End: 2021-03-03 | Stop reason: SDUPTHER

## 2020-02-06 RX ORDER — HYDROCHLOROTHIAZIDE 50 MG/1
TABLET ORAL
COMMUNITY
Start: 2015-09-16 | End: 2020-08-07 | Stop reason: SDUPTHER

## 2020-02-06 NOTE — PROGRESS NOTES
Chief Complaint   Patient presents with    Cough     Patient in room #4 with cough, fluid in ears, green nasal mucus, pain in sides, sore throat from coughing so much, head ache

## 2020-02-06 NOTE — PROGRESS NOTES
HISTORY OF PRESENT ILLNESS  Luanne Chandler is a 64 y.o. female. Patient reports productive to dry cough for over 1 month. She has tried mucinex, benadryl, and dayquil. Sputum is purulent and thick at times. No fever, but notes chills and shortness of breath at times.  has similar symptoms. Visit Vitals  /80   Pulse 94   Temp 98.4 °F (36.9 °C) (Oral)   Resp 16   Ht 5' 3\" (1.6 m)   Wt 214 lb (97.1 kg)   LMP 08/26/2014   SpO2 97%   BMI 37.91 kg/m²       HPI    Review of Systems   HENT: Positive for congestion. Respiratory: Positive for cough and sputum production. Physical Exam  Constitutional:       Appearance: Normal appearance. HENT:      Head: Normocephalic. Right Ear: Hearing, ear canal and external ear normal.      Left Ear: Hearing, ear canal and external ear normal.      Ears:      Comments: Clear fluid noted behind both TMs     Nose: Congestion present. Mouth/Throat:      Lips: Pink. Mouth: Mucous membranes are moist.      Pharynx: Oropharynx is clear. Neck:      Musculoskeletal: Normal range of motion. Cardiovascular:      Rate and Rhythm: Normal rate and regular rhythm. Pulmonary:      Effort: Pulmonary effort is normal.      Breath sounds: Normal breath sounds. Comments: Persistent cough throughout exam  Lymphadenopathy:      Cervical: No cervical adenopathy. Neurological:      General: No focal deficit present. Mental Status: She is alert and oriented to person, place, and time. Psychiatric:         Mood and Affect: Mood normal.         Behavior: Behavior normal.         ASSESSMENT and PLAN    ICD-10-CM ICD-9-CM    1.  Bronchitis J40 490      Orders Placed This Encounter    bran/gum/fib/celina/psyl/kelp/pec (FIBER 6 PO)    Lactobac no.41/Bifidobact no.7 (PROBIOTIC-10 PO)    azelastine (ASTEPRO) 0.15 % (205.5 mcg)    buPROPion SR (WELLBUTRIN SR) 150 mg SR tablet    famotidine (PEPCID) 40 mg tablet    hydroCHLOROthiazide (HYDRODIURIL) 50 mg tablet    omeprazole (PRILOSEC) 20 mg capsule    methylPREDNISolone (MEDROL DOSEPACK) 4 mg tablet    doxycycline (VIBRAMYCIN) 100 mg capsule   follow up if no improvement with antibiotic and steroids

## 2020-08-07 ENCOUNTER — OFFICE VISIT (OUTPATIENT)
Dept: PRIMARY CARE CLINIC | Age: 60
End: 2020-08-07
Payer: COMMERCIAL

## 2020-08-07 ENCOUNTER — DOCUMENTATION ONLY (OUTPATIENT)
Dept: PRIMARY CARE CLINIC | Age: 60
End: 2020-08-07

## 2020-08-07 VITALS
RESPIRATION RATE: 16 BRPM | WEIGHT: 208.6 LBS | HEIGHT: 63 IN | DIASTOLIC BLOOD PRESSURE: 78 MMHG | BODY MASS INDEX: 36.96 KG/M2 | OXYGEN SATURATION: 96 % | SYSTOLIC BLOOD PRESSURE: 119 MMHG | TEMPERATURE: 98.4 F | HEART RATE: 70 BPM

## 2020-08-07 DIAGNOSIS — R20.0 NUMBNESS AND TINGLING OF LEFT LEG: ICD-10-CM

## 2020-08-07 DIAGNOSIS — R20.2 NUMBNESS AND TINGLING OF LEFT LEG: ICD-10-CM

## 2020-08-07 DIAGNOSIS — S80.12XA CONTUSION OF LEFT LOWER LEG, INITIAL ENCOUNTER: Primary | ICD-10-CM

## 2020-08-07 DIAGNOSIS — S89.92XA INJURY OF LEFT LOWER LEG, INITIAL ENCOUNTER: ICD-10-CM

## 2020-08-07 PROCEDURE — 99213 OFFICE O/P EST LOW 20 MIN: CPT | Performed by: NURSE PRACTITIONER

## 2020-08-07 RX ORDER — METHYLPREDNISOLONE 4 MG/1
TABLET ORAL
COMMUNITY
Start: 2020-04-29 | End: 2020-08-07 | Stop reason: SDUPTHER

## 2020-08-07 RX ORDER — LORAZEPAM 1 MG/1
TABLET ORAL
COMMUNITY
Start: 2020-07-15 | End: 2020-08-07 | Stop reason: SDUPTHER

## 2020-08-07 RX ORDER — OMEPRAZOLE MAGNESIUM 10 MG/1
GRANULE, DELAYED RELEASE ORAL
COMMUNITY
End: 2020-08-07 | Stop reason: SDUPTHER

## 2020-08-07 NOTE — PROGRESS NOTES
Subjective:      Rajiv Penn is a 64 y.o. female seen for evaluation and treatment of a left leg injury. This is evaluated as a personal injury. The injury was sustained 6 days ago, and occurred while getting out of the pool. Her  passed out and she was rushing to get to him. As she was getting out of the pool she slipped on the ladder hitting her leg. She did not hear or sense a pop or snap at the time of the injury. The patient notes pain and mild swelling since the injury. She notes bruising and numbness to left lateral leg just below the knee. She has not injured this site in the past.  Applied ice on day of injury. Not taking any meds for pain. Past Medical History:   Diagnosis Date    Chronic kidney disease     Diverticulosis     GERD (gastroesophageal reflux disease)     Hypertension     Renal carcinoma, right ([address]     kidney    Sleep apnea      Past Surgical History:   Procedure Laterality Date    HX CERVICAL DISKECTOMY      HX  SECTION   and     HX CHOLECYSTECTOMY      HX CHOLECYSTECTOMY      HX GYN      ablation    HX GYN  2015    hysterectomy    HX ORTHOPAEDIC      Neck Surgery.  HX ORTHOPAEDIC Left     knee surgery x 3    HX OTHER SURGICAL  2016    colon resection    HX ROTATOR CUFF REPAIR Right 2006    HX TUMOR REMOVAL  2018    R Kidney      No Known Allergies        Objective:     Visit Vitals  /78 (BP 1 Location: Left arm, BP Patient Position: Sitting)   Pulse 70   Temp 98.4 °F (36.9 °C) (Oral)   Resp 16   Ht 5' 3\" (1.6 m)   Wt 208 lb 9.6 oz (94.6 kg)   LMP 2014   SpO2 96%   BMI 36.95 kg/m²      Appearance: alert, well appearing, and in no distress. Musculoskeletal exam: abnormal exam of left lower leg: contusion and mild localized swellingnoted to lower lateral leg just below the knee and extends down to mid-shin area. Knee exam is normal.  Normal ROM of lower leg, foot and ankle. Normal gait.   No swelling to distal lower extremity, ankle or foot. Skin is intact, no open wound or abrasion. Imaging  is performed, appears normal - no fracture, pending final read by radiologist    Assessment/Plan:       ICD-10-CM ICD-9-CM    1. Contusion of left lower leg, initial encounter  S80.12XA 924.10 XR TIB/FIB LT   2. Injury of left lower leg, initial encounter  S89. 92XA 959.7 XR TIB/FIB LT   3. Numbness and tingling of left leg  R20.0 782.0 XR TIB/FIB LT    R20.2         The patient is advised to rest, apply cold or ice intermittently and elevate affected extremity. F/u prn. Jet Villanueva NP  This note will not be viewable in [address]

## 2020-08-07 NOTE — PATIENT INSTRUCTIONS
Contusion: Care Instructions  Your Care Instructions     Contusion is the medical term for a bruise. It is the result of a direct blow or an impact, such as a fall. Contusions are common sports injuries. Most people think of a bruise as a black-and-blue spot. This happens when small blood vessels get torn and leak blood under the skin. But bones, muscles, and organs can also get bruised. This may damage deep tissues but not cause a bruise you can see. The doctor will do a physical exam to find the location of your contusion. You may also have tests to make sure you do not have a more serious injury, such as a broken bone or nerve damage. These may include X-rays or other imaging tests like a CT scan or MRI. Deep-tissue contusions may cause pain and swelling. But if there is no serious damage, they will often get better in a few weeks with home treatment. The doctor has checked you carefully, but problems can develop later. If you notice any problems or new symptoms, get medical treatment right away. Follow-up care is a key part of your treatment and safety. Be sure to make and go to all appointments, and call your doctor if you are having problems. It's also a good idea to know your test results and keep a list of the medicines you take. How can you care for yourself at home? · Put ice or a cold pack on the sore area for 10 to 20 minutes at a time to stop swelling. Put a thin cloth between the ice pack and your skin. · Be safe with medicines. Read and follow all instructions on the label. ? If the doctor gave you a prescription medicine for pain, take it as prescribed. ? If you are not taking a prescription pain medicine, ask your doctor if you can take an over-the-counter medicine. · If you can, prop up the sore area on pillows as much as possible for the next few days. Try to keep the sore area above the level of your heart. When should you call for help?    Call your doctor now or seek immediate medical care if:  · Your pain gets worse. · You have new or worse swelling. · You have tingling, weakness, or numbness in the area near the contusion. · The area near the contusion is cold or pale. Watch closely for changes in your health, and be sure to contact your doctor if:  · You do not get better as expected. Where can you learn more? Go to http://anna-sanam.info/  Enter [id number] in the search box to learn more about \"Contusion: Care Instructions. \"  Current as of: June 26, 2019               Content Version: 12.5  © 9811-6375 [employer]. Care instructions adapted under license by [employer] (which disclaims liability or warranty for this information). If you have questions about a medical condition or this instruction, always ask your healthcare professional. [address] any warranty or liability for your use of this information. Contusion: Care Instructions  Your Care Instructions     Contusion is the medical term for a bruise. It is the result of a direct blow or an impact, such as a fall. Contusions are common sports injuries. Most people think of a bruise as a black-and-blue spot. This happens when small blood vessels get torn and leak blood under the skin. But bones, muscles, and organs can also get bruised. This may damage deep tissues but not cause a bruise you can see. The doctor will do a physical exam to find the location of your contusion. You may also have tests to make sure you do not have a more serious injury, such as a broken bone or nerve damage. These may include X-rays or other imaging tests like a CT scan or MRI. Deep-tissue contusions may cause pain and swelling. But if there is no serious damage, they will often get better in a few weeks with home treatment. The doctor has checked you carefully, but problems can develop later.  If you notice any problems or new symptoms, get medical treatment right away.  Follow-up care is a key part of your treatment and safety. Be sure to make and go to all appointments, and call your doctor if you are having problems. It's also a good idea to know your test results and keep a list of the medicines you take. How can you care for yourself at home? · Put ice or a cold pack on the sore area for 10 to 20 minutes at a time to stop swelling. Put a thin cloth between the ice pack and your skin. · Be safe with medicines. Read and follow all instructions on the label. ? If the doctor gave you a prescription medicine for pain, take it as prescribed. ? If you are not taking a prescription pain medicine, ask your doctor if you can take an over-the-counter medicine. · If you can, prop up the sore area on pillows as much as possible for the next few days. Try to keep the sore area above the level of your heart. When should you call for help? Call your doctor now or seek immediate medical care if:  · Your pain gets worse. · You have new or worse swelling. · You have tingling, weakness, or numbness in the area near the contusion. · The area near the contusion is cold or pale. Watch closely for changes in your health, and be sure to contact your doctor if:  · You do not get better as expected. Where can you learn more? Go to http://anna-sanam.info/  Enter [id number] in the search box to learn more about \"Contusion: Care Instructions. \"  Current as of: June 26, 2019               Content Version: 12.5  © 4603-7440 [employer]. Care instructions adapted under license by [employer] (which disclaims liability or warranty for this information). If you have questions about a medical condition or this instruction, always ask your healthcare professional. [address] any warranty or liability for your use of this information.

## 2020-08-07 NOTE — PROGRESS NOTES
Sergio Martin is a 64 y.o. female    Room:4    Chief Complaint   Patient presents with    Fall     Pt c/o fall, lower left leg injury. Pt states \" i fell and hit my sheen on sunday it immediately sweled up it numb on the left side and feels like something is moving arund in there, have not taken any medications, pain is worse at night when i lay down\". Visit Vitals  /78 (BP 1 Location: Left arm, BP Patient Position: Sitting)   Pulse 70   Temp 98.4 °F (36.9 °C) (Oral)   Resp 16   Ht 5' 3\" (1.6 m)   Wt 208 lb 9.6 oz (94.6 kg)   SpO2 96%   BMI 36.95 kg/m²       Pain Scale: 6/10    1. Have you been to the ER, urgent care clinic since your last visit? Hospitalized since your last visit? No    2. Have you seen or consulted any other health care providers outside of the [address] [place] [address] since your last visit? Include any pap smears or colon screening.  No

## 2020-10-12 ENCOUNTER — TRANSCRIBE ORDER (OUTPATIENT)
Dept: SCHEDULING | Age: 60
End: 2020-10-12

## 2020-10-12 DIAGNOSIS — R14.0 ABDOMINAL BLOATING: Primary | ICD-10-CM

## 2020-10-12 DIAGNOSIS — K58.9 IRRITABLE BOWEL SYNDROME: ICD-10-CM

## 2020-10-12 DIAGNOSIS — K30 DYSPEPSIA DUE TO DYSMOTILITY: ICD-10-CM

## 2020-10-12 DIAGNOSIS — K20.0 EOSINOPHILIC ESOPHAGITIS: ICD-10-CM

## 2020-11-06 ENCOUNTER — HOSPITAL ENCOUNTER (OUTPATIENT)
Dept: NUCLEAR MEDICINE | Age: 60
Discharge: HOME | End: 2020-11-06
Attending: SPECIALIST
Payer: COMMERCIAL

## 2020-11-06 DIAGNOSIS — K20.0 EOSINOPHILIC ESOPHAGITIS: ICD-10-CM

## 2020-11-06 DIAGNOSIS — K30 DYSPEPSIA DUE TO DYSMOTILITY: ICD-10-CM

## 2020-11-06 DIAGNOSIS — K58.9 IRRITABLE BOWEL SYNDROME: ICD-10-CM

## 2020-11-06 DIAGNOSIS — R14.0 ABDOMINAL BLOATING: ICD-10-CM

## 2020-11-06 PROCEDURE — 78264 GASTRIC EMPTYING IMG STUDY: CPT

## 2020-12-31 LAB — SARS-COV-2, NAA: DETECTED

## 2021-01-03 ENCOUNTER — HOSPITAL ENCOUNTER (EMERGENCY)
Age: 61
Discharge: HOME | End: 2021-01-03
Attending: EMERGENCY MEDICINE
Payer: COMMERCIAL

## 2021-01-03 VITALS
HEART RATE: 83 BPM | OXYGEN SATURATION: 95 % | WEIGHT: 206.57 LBS | BODY MASS INDEX: 36.6 KG/M2 | SYSTOLIC BLOOD PRESSURE: 108 MMHG | DIASTOLIC BLOOD PRESSURE: 68 MMHG | TEMPERATURE: 98.9 F | HEIGHT: 63 IN | RESPIRATION RATE: 24 BRPM

## 2021-01-03 DIAGNOSIS — U07.1 COVID-19: Primary | ICD-10-CM

## 2021-01-03 DIAGNOSIS — R19.7 DIARRHEA, UNSPECIFIED TYPE: ICD-10-CM

## 2021-01-03 DIAGNOSIS — E86.0 MILD DEHYDRATION: ICD-10-CM

## 2021-01-03 LAB
ALBUMIN SERPL-MCNC: 3.4 G/DL (ref 3.5–5)
ALBUMIN/GLOB SERPL: 0.9 {RATIO} (ref 1.1–2.2)
ALP SERPL-CCNC: 81 U/L (ref 45–117)
ALT SERPL-CCNC: 45 U/L (ref 12–78)
ANION GAP SERPL CALC-SCNC: 8 MMOL/L (ref 5–15)
AST SERPL-CCNC: 43 U/L (ref 15–37)
BASOPHILS # BLD: 0 K/UL (ref 0–0.1)
BASOPHILS NFR BLD: 0 % (ref 0–1)
BILIRUB SERPL-MCNC: 1.6 MG/DL (ref 0.2–1)
BUN SERPL-MCNC: 13 MG/DL (ref 6–20)
BUN/CREAT SERPL: 11 (ref 12–20)
CALCIUM SERPL-MCNC: 8.1 MG/DL (ref 8.5–10.1)
CHLORIDE SERPL-SCNC: 100 MMOL/L (ref 97–108)
CO2 SERPL-SCNC: 25 MMOL/L (ref 21–32)
CREAT SERPL-MCNC: 1.14 MG/DL (ref 0.55–1.02)
DIFFERENTIAL METHOD BLD: ABNORMAL
EOSINOPHIL # BLD: 0 K/UL (ref 0–0.4)
EOSINOPHIL NFR BLD: 0 % (ref 0–7)
ERYTHROCYTE [DISTWIDTH] IN BLOOD BY AUTOMATED COUNT: 12.7 % (ref 11.5–14.5)
GLOBULIN SER CALC-MCNC: 3.8 G/DL (ref 2–4)
GLUCOSE SERPL-MCNC: 107 MG/DL (ref 65–100)
HCT VFR BLD AUTO: 39.7 % (ref 35–47)
HGB BLD-MCNC: 14 G/DL (ref 11.5–16)
IMM GRANULOCYTES # BLD AUTO: 0.1 K/UL (ref 0–0.04)
IMM GRANULOCYTES NFR BLD AUTO: 1 % (ref 0–0.5)
LIPASE SERPL-CCNC: 275 U/L (ref 73–393)
LYMPHOCYTES # BLD: 0.7 K/UL (ref 0.8–3.5)
LYMPHOCYTES NFR BLD: 11 % (ref 12–49)
MAGNESIUM SERPL-MCNC: 2.2 MG/DL (ref 1.6–2.4)
MCH RBC QN AUTO: 29.7 PG (ref 26–34)
MCHC RBC AUTO-ENTMCNC: 35.3 G/DL (ref 30–36.5)
MCV RBC AUTO: 84.1 FL (ref 80–99)
MONOCYTES # BLD: 0.4 K/UL (ref 0–1)
MONOCYTES NFR BLD: 7 % (ref 5–13)
NEUTS SEG # BLD: 4.9 K/UL (ref 1.8–8)
NEUTS SEG NFR BLD: 81 % (ref 32–75)
NRBC # BLD: 0 K/UL (ref 0–0.01)
NRBC BLD-RTO: 0 PER 100 WBC
PLATELET # BLD AUTO: 193 K/UL (ref 150–400)
PMV BLD AUTO: 9.4 FL (ref 8.9–12.9)
POTASSIUM SERPL-SCNC: 3.4 MMOL/L (ref 3.5–5.1)
PROT SERPL-MCNC: 7.2 G/DL (ref 6.4–8.2)
RBC # BLD AUTO: 4.72 M/UL (ref 3.8–5.2)
RBC MORPH BLD: ABNORMAL
SODIUM SERPL-SCNC: 133 MMOL/L (ref 136–145)
WBC # BLD AUTO: 6.1 K/UL (ref 3.6–11)

## 2021-01-03 PROCEDURE — 74011250637 HC RX REV CODE- 250/637: Performed by: EMERGENCY MEDICINE

## 2021-01-03 PROCEDURE — 83690 ASSAY OF LIPASE: CPT

## 2021-01-03 PROCEDURE — 87506 IADNA-DNA/RNA PROBE TQ 6-11: CPT

## 2021-01-03 PROCEDURE — 36415 COLL VENOUS BLD VENIPUNCTURE: CPT

## 2021-01-03 PROCEDURE — 83735 ASSAY OF MAGNESIUM: CPT

## 2021-01-03 PROCEDURE — 96361 HYDRATE IV INFUSION ADD-ON: CPT

## 2021-01-03 PROCEDURE — 74011250636 HC RX REV CODE- 250/636: Performed by: EMERGENCY MEDICINE

## 2021-01-03 PROCEDURE — 96376 TX/PRO/DX INJ SAME DRUG ADON: CPT

## 2021-01-03 PROCEDURE — 87449 NOS EACH ORGANISM AG IA: CPT

## 2021-01-03 PROCEDURE — 74011000250 HC RX REV CODE- 250: Performed by: EMERGENCY MEDICINE

## 2021-01-03 PROCEDURE — 96375 TX/PRO/DX INJ NEW DRUG ADDON: CPT

## 2021-01-03 PROCEDURE — 80053 COMPREHEN METABOLIC PANEL: CPT

## 2021-01-03 PROCEDURE — 99285 EMERGENCY DEPT VISIT HI MDM: CPT

## 2021-01-03 PROCEDURE — 96374 THER/PROPH/DIAG INJ IV PUSH: CPT

## 2021-01-03 PROCEDURE — 85025 COMPLETE CBC W/AUTO DIFF WBC: CPT

## 2021-01-03 RX ORDER — LOPERAMIDE HYDROCHLORIDE 2 MG/1
2 CAPSULE ORAL
Qty: 20 CAP | Refills: 0 | Status: SHIPPED | OUTPATIENT
Start: 2021-01-03 | End: 2021-01-13

## 2021-01-03 RX ORDER — ONDANSETRON 4 MG/1
4 TABLET, ORALLY DISINTEGRATING ORAL
Qty: 10 TAB | Refills: 0 | Status: SHIPPED | OUTPATIENT
Start: 2021-01-03 | End: 2021-01-05

## 2021-01-03 RX ORDER — ONDANSETRON 2 MG/ML
4 INJECTION INTRAMUSCULAR; INTRAVENOUS
Status: COMPLETED | OUTPATIENT
Start: 2021-01-03 | End: 2021-01-03

## 2021-01-03 RX ORDER — ACETAMINOPHEN 325 MG/1
650 TABLET ORAL ONCE
Status: COMPLETED | OUTPATIENT
Start: 2021-01-03 | End: 2021-01-03

## 2021-01-03 RX ORDER — FAMOTIDINE 20 MG/1
20 TABLET, FILM COATED ORAL 2 TIMES DAILY
Qty: 20 TAB | Refills: 0 | Status: SHIPPED | OUTPATIENT
Start: 2021-01-03 | End: 2021-01-05

## 2021-01-03 RX ORDER — KETOROLAC TROMETHAMINE 30 MG/ML
15 INJECTION, SOLUTION INTRAMUSCULAR; INTRAVENOUS
Status: COMPLETED | OUTPATIENT
Start: 2021-01-03 | End: 2021-01-03

## 2021-01-03 RX ADMIN — ACETAMINOPHEN 650 MG: 325 TABLET ORAL at 19:35

## 2021-01-03 RX ADMIN — KETOROLAC TROMETHAMINE 15 MG: 30 INJECTION, SOLUTION INTRAMUSCULAR; INTRAVENOUS at 16:27

## 2021-01-03 RX ADMIN — ONDANSETRON 4 MG: 2 INJECTION INTRAMUSCULAR; INTRAVENOUS at 16:27

## 2021-01-03 RX ADMIN — SODIUM CHLORIDE 1000 ML: 9 INJECTION, SOLUTION INTRAVENOUS at 18:23

## 2021-01-03 RX ADMIN — ONDANSETRON 4 MG: 2 INJECTION INTRAMUSCULAR; INTRAVENOUS at 19:35

## 2021-01-03 RX ADMIN — LIDOCAINE HYDROCHLORIDE 40 ML: 20 SOLUTION ORAL; TOPICAL at 19:35

## 2021-01-03 RX ADMIN — SODIUM CHLORIDE 1000 ML: 9 INJECTION, SOLUTION INTRAVENOUS at 15:38

## 2021-01-03 NOTE — ED TRIAGE NOTES
Patient presents to the ED COVID positive. Patient reports that she has been having diarrhea, and nausea for the last couple of days. Patient reports she feels dehydrated and ONLY has one kidney. Patient reports she was referred by Brian Alejandra to be evaluated.

## 2021-01-03 NOTE — ED NOTES
Pt started with fever, upper abdominal pain and diarrhea on 12/28. She tested positive for covid on 12/31. She comes in today for the same complaints. She states that her fever is getting higher and does not come down below 101F even with tylenol. The upper abdominal pain is sharp and tender to palpation. In addition, if she eats or drinks anything she immediately has watery diarrhea.

## 2021-01-04 ENCOUNTER — PATIENT OUTREACH (OUTPATIENT)
Dept: CASE MANAGEMENT | Age: 61
End: 2021-01-04

## 2021-01-04 LAB
C DIFF GDH STL QL: NEGATIVE
C DIFF TOX A+B STL QL IA: NEGATIVE
CAMPYLOBACTER SPECIES, DNA: NEGATIVE
ENTEROTOXIGEN E COLI, DNA: NEGATIVE
INTERPRETATION: NORMAL
P SHIGELLOIDES DNA STL QL NAA+PROBE: NEGATIVE
SALMONELLA SPECIES, DNA: NEGATIVE
SHIGA TOXIN PRODUCING, DNA: NEGATIVE
SHIGELLA SP+EIEC IPAH STL QL NAA+PROBE: NEGATIVE
VIBRIO SPECIES, DNA: NEGATIVE
Y. ENTEROCOLITICA, DNA: NEGATIVE

## 2021-01-04 NOTE — ED NOTES
Patient discharged by Aislinn Torres MD. Patient provided with discharge instructions Rx and instructions on follow up care. Patient out of ED ambulatory accompanied by self.

## 2021-01-04 NOTE — ED NOTES
Report given to Josefina Huerta RN. RN was informed of patient chief complaint, current status, orders completed (to include IV access/medications/radiology testing), outstanding orders that still need to be completed, and the treatment plan. Ensured no questions or concerns regarding the patient prior to departure.

## 2021-01-04 NOTE — ED PROVIDER NOTES
EMERGENCY DEPARTMENT HISTORY AND PHYSICAL EXAM      Date: 1/3/2021  Patient Name: Tila Briggs    History of Presenting Illness     Chief Complaint   Patient presents with    Diarrhea    Positive For Covid-19       History Provided By: Patient    HPI: Tila Briggs, 64 y.o. female with PMHx significant for hypertension, prior renal carcinoma status post right nephrectomy, known Covid 23 diagnosis with symptoms beginning on 12/28 who presents with a chief complaint of persistent fevers, epigastric burning, and diarrhea. Patient states beginning on 12/28 she had fevers as high as 102.8, diarrhea, epigastric burning. She denies any cough, shortness of breath, or chest pain. She did receive a Z-Flo from her primary care provider at the onset of her symptoms. Has taken Imodium which she says helps some. She reports that her diarrhea is \"constant. \"  Denies any recent antibiotics outside of the Z-Flo. PCP: Pablo Boone MD    There are no other complaints, changes, or physical findings at this time. Current Outpatient Medications   Medication Sig Dispense Refill    ondansetron (Zofran ODT) 4 mg disintegrating tablet Take 1 Tab by mouth every eight (8) hours as needed for Nausea. 10 Tab 0    famotidine (Pepcid) 20 mg tablet Take 1 Tab by mouth two (2) times a day. 20 Tab 0    loperamide (IMODIUM) 2 mg capsule Take 1 Cap by mouth four (4) times daily as needed for Diarrhea for up to 10 days. 20 Cap 0    bran/gum/fib/celina/psyl/kelp/pec (FIBER 6 PO) fiber      buPROPion SR (WELLBUTRIN SR) 150 mg SR tablet       famotidine (PEPCID) 40 mg tablet       omeprazole (PRILOSEC) 20 mg capsule       grape seed extract (GRAPE SEED PO) Take  by mouth.  hydroCHLOROthiazide (HYDRODIURIL) 25 mg tablet Take 1/2 tablet by mouth once a day      B.infantis-B.ani-B.long-B.bifi (PROBIOTIC 4X) 10-15 mg TbEC Take 10-15 mg by mouth daily.        Past History     Past Medical History:  Past Medical History: Diagnosis Date    Chronic kidney disease     Diverticulosis     GERD (gastroesophageal reflux disease)     Hypertension     Renal carcinoma, right ([address]     kidney    Sleep apnea      Past Surgical History:  Past Surgical History:   Procedure Laterality Date    HX CERVICAL DISKECTOMY      HX  SECTION   and     HX CHOLECYSTECTOMY      HX CHOLECYSTECTOMY      HX GYN      ablation    HX GYN  2015    hysterectomy    HX ORTHOPAEDIC      Neck Surgery.  HX ORTHOPAEDIC Left     knee surgery x 3    HX OTHER SURGICAL  2016    colon resection    HX ROTATOR CUFF REPAIR Right 2006    HX TUMOR REMOVAL  2018    R Kidney      Family History:  Family History   Problem Relation Age of Onset    Hypertension Mother     Hypertension Father     Diabetes Sister     Anesth Problems Neg Hx      Social History:  Social History     Tobacco Use    Smoking status: Never Smoker    Smokeless tobacco: Never Used   Substance Use Topics    Alcohol use: Yes     Comment: OCC    Drug use: No     Allergies:  No Known Allergies  Review of Systems   Review of Systems   Constitutional: Positive for fever. Negative for chills. HENT: Negative for congestion, rhinorrhea and sore throat. Respiratory: Negative for cough and shortness of breath. Cardiovascular: Negative for chest pain. Gastrointestinal: Positive for diarrhea. Negative for abdominal pain, nausea and vomiting. Genitourinary: Negative for dysuria and urgency. Skin: Negative for rash. Neurological: Negative for dizziness, light-headedness and headaches. All other systems reviewed and are negative. Physical Exam   Physical Exam  Vitals signs and nursing note reviewed. Constitutional:       General: She is not in acute distress. Appearance: She is well-developed. HENT:      Head: Normocephalic and atraumatic.    Eyes:      Conjunctiva/sclera: Conjunctivae normal.      Pupils: Pupils are equal, round, and reactive to light. Neck:      Musculoskeletal: Normal range of motion. Cardiovascular:      Rate and Rhythm: Normal rate and regular rhythm. Pulmonary:      Effort: Pulmonary effort is normal. No respiratory distress. Breath sounds: Normal breath sounds. No stridor. Abdominal:      General: There is no distension. Palpations: Abdomen is soft. Tenderness: There is no abdominal tenderness. Musculoskeletal: Normal range of motion. Skin:     General: Skin is warm and dry. Neurological:      Mental Status: She is alert and oriented to person, place, and time. Diagnostic Study Results   Labs -     Recent Results (from the past 12 hour(s))   CBC WITH AUTOMATED DIFF    Collection Time: 01/03/21  2:15 PM   Result Value Ref Range    WBC 6.1 3.6 - 11.0 K/uL    RBC 4.72 3.80 - 5.20 M/uL    HGB 14.0 11.5 - 16.0 g/dL    HCT 39.7 35.0 - 47.0 %    MCV 84.1 80.0 - 99.0 FL    MCH 29.7 26.0 - 34.0 PG    MCHC 35.3 30.0 - 36.5 g/dL    RDW 12.7 11.5 - 14.5 %    PLATELET 581 444 - 147 K/uL    MPV 9.4 8.9 - 12.9 FL    NRBC 0.0 0  WBC    ABSOLUTE NRBC 0.00 0.00 - 0.01 K/uL    NEUTROPHILS 81 (H) 32 - 75 %    LYMPHOCYTES 11 (L) 12 - 49 %    MONOCYTES 7 5 - 13 %    EOSINOPHILS 0 0 - 7 %    BASOPHILS 0 0 - 1 %    IMMATURE GRANULOCYTES 1 (H) 0.0 - 0.5 %    ABS. NEUTROPHILS 4.9 1.8 - 8.0 K/UL    ABS. LYMPHOCYTES 0.7 (L) 0.8 - 3.5 K/UL    ABS. MONOCYTES 0.4 0.0 - 1.0 K/UL    ABS. EOSINOPHILS 0.0 0.0 - 0.4 K/UL    ABS. BASOPHILS 0.0 0.0 - 0.1 K/UL    ABS. IMM.  GRANS. 0.1 (H) 0.00 - 0.04 K/UL    DF SMEAR SCANNED      RBC COMMENTS NORMOCYTIC, NORMOCHROMIC     METABOLIC PANEL, COMPREHENSIVE    Collection Time: 01/03/21  2:15 PM   Result Value Ref Range    Sodium 133 (L) 136 - 145 mmol/L    Potassium 3.4 (L) 3.5 - 5.1 mmol/L    Chloride 100 97 - 108 mmol/L    CO2 25 21 - 32 mmol/L    Anion gap 8 5 - 15 mmol/L    Glucose 107 (H) 65 - 100 mg/dL    BUN 13 6 - 20 MG/DL    Creatinine 1.14 (H) 0.55 - 1.02 MG/DL BUN/Creatinine ratio 11 (L) 12 - 20      GFR est AA 59 (L) >60 ml/min/1.73m2    GFR est non-AA 49 (L) >60 ml/min/1.73m2    Calcium 8.1 (L) 8.5 - 10.1 MG/DL    Bilirubin, total 1.6 (H) 0.2 - 1.0 MG/DL    ALT (SGPT) 45 12 - 78 U/L    AST (SGOT) 43 (H) 15 - 37 U/L    Alk. phosphatase 81 45 - 117 U/L    Protein, total 7.2 6.4 - 8.2 g/dL    Albumin 3.4 (L) 3.5 - 5.0 g/dL    Globulin 3.8 2.0 - 4.0 g/dL    A-G Ratio 0.9 (L) 1.1 - 2.2     MAGNESIUM    Collection Time: 01/03/21  2:15 PM   Result Value Ref Range    Magnesium 2.2 1.6 - 2.4 mg/dL   LIPASE    Collection Time: 01/03/21  2:15 PM   Result Value Ref Range    Lipase 275 73 - 393 U/L       Radiologic Studies -   No orders to display     No results found. Medical Decision Making   I am the first provider for this patient. I reviewed the vital signs, available nursing notes, past medical history, past surgical history, family history and social history. Vital Signs-Reviewed the patient's vital signs.   Patient Vitals for the past 12 hrs:   Temp Pulse Resp BP SpO2   01/03/21 1930  83 24 108/68 95 %   01/03/21 1915  74 21  94 %   01/03/21 1900  73 15 114/81 95 %   01/03/21 1845  77 20  95 %   01/03/21 1830  75 16 113/74 94 %   01/03/21 1815 98.9 °F (37.2 °C) 80 17  95 %   01/03/21 1800  78 20 108/76 94 %   01/03/21 1745  78 18  95 %   01/03/21 1730  78 21 109/73 95 %   01/03/21 1715  78 19  94 %   01/03/21 1700  84 21 105/75 94 %   01/03/21 1630  79 17 123/81 95 %   01/03/21 1615  80 22  94 %   01/03/21 1600  82 18 (!) 87/42 96 %   01/03/21 1545  87 18 (!) 118/57 95 %   01/03/21 1530  86 18 (!) 139/94 95 %   01/03/21 1526     95 %   01/03/21 1515  89 22  96 %   01/03/21 1410 (!) 100.9 °F (38.3 °C) 99 18 (!) 154/132 96 %       Pulse Oximetry Analysis - 95% on ra    Cardiac Monitor:   Rate: 89 bpm  Rhythm: Normal Sinus Rhythm      Records Reviewed: Nursing Notes and Old Medical Records    Provider Notes (Medical Decision Making):   Patient presents with fever, diarrhea, epigastric burning in the setting of known COVID-19 diagnosis. On arrival she is mildly febrile but otherwise well-appearing, not hypoxic or tachycardic. Suspect symptoms related to COVID-19. Will check basic lab work to rule out significant electrolyte imbalance. Abdominal exam is nonfocal so do not feel imaging is needed at this time. Will treat symptoms and reevaluate. If patient has a bowel movement here we will send stool sample to lab, however anticipate her diarrhea is related to her COVID-19 infection. ED Course:   Initial assessment performed. The patients presenting problems have been discussed, and they are in agreement with the care plan formulated and outlined with them. I have encouraged them to ask questions as they arise throughout their visit. Patient feeling significantly improved after IV fluids. Afebrile after antipyretics. Will discharge with instructions to stay well-hydrated and continue to take Imodium as needed. Also prescribe Zofran. Procedures:  Procedures    Critical Care:  none    Disposition:  Discharge Note:  The patient has been re-evaluated and is ready for discharge. Reviewed available results with patient. Counseled patient on diagnosis and care plan. Patient has expressed understanding, and all questions have been answered. Patient agrees with plan and agrees to follow up as recommended, or to return to the ED if their symptoms worsen. Discharge instructions have been provided and explained to the patient, along with reasons to return to the ED. PLAN:  1.    Discharge Medication List as of 1/3/2021  8:04 PM      START taking these medications    Details   ondansetron (Zofran ODT) 4 mg disintegrating tablet Take 1 Tab by mouth every eight (8) hours as needed for Nausea., Normal, Disp-10 Tab, R-0      !! famotidine (Pepcid) 20 mg tablet Take 1 Tab by mouth two (2) times a day., Normal, Disp-20 Tab, R-0      loperamide (IMODIUM) 2 mg capsule Take 1 Cap by mouth four (4) times daily as needed for Diarrhea for up to 10 days. , Normal, Disp-20 Cap, R-0       !! - Potential duplicate medications found. Please discuss with provider. CONTINUE these medications which have NOT CHANGED    Details   bran/gum/fib/celina/psyl/kelp/pec (FIBER 6 PO) fiber, Historical Med      buPROPion SR (WELLBUTRIN SR) 150 mg SR tablet Historical Med      !! famotidine (PEPCID) 40 mg tablet Historical Med      omeprazole (PRILOSEC) 20 mg capsule Historical Med      grape seed extract (GRAPE SEED PO) Take  by mouth., Historical Med      hydroCHLOROthiazide (HYDRODIURIL) 25 mg tablet Take 1/2 tablet by mouth once a day, Historical Med      B.infantis-B.ani-B.long-B.bifi (PROBIOTIC 4X) 10-15 mg TbEC Take 10-15 mg by mouth daily. , Historical Med       !! - Potential duplicate medications found. Please discuss with provider. 2.   Follow-up Information     Follow up With Specialties Details Why Contact Info    Anderson Mares MD Family Medicine Schedule an appointment as soon as possible for a visit   [address] [place] [address]  [phone]      [facility] EMERGENCY DEPT Emergency Medicine  As needed, If symptoms worsen [address] [place] [address]  [phone]        Return to ED if worse     Diagnosis     Clinical Impression:   1. COVID-19    2. Diarrhea, unspecified type    3. Mild dehydration            Please note that this dictation was completed with [employer], the [email or web address] voice recognition software. Quite often unanticipated grammatical, syntax, homophones, and other interpretive errors are inadvertently transcribed by the computer software. Please disregard these errors.   Please excuse any errors that have escaped final proofreading

## 2021-01-04 NOTE — PROGRESS NOTES
Patient contacted regarding recent discharge and COVID-19 risk. Discussed COVID-19 related testing which was not done at this time. Test results were not done. Patient informed of results, if available? no    Outreach made within 2 business days of discharge: Yes    Care Transition Nurse/ Ambulatory Care Manager/ LPN Care Coordinator contacted the patient by telephone to perform post discharge assessment. Verified name and  with patient as identifiers. Patient has following risk factors of: no known risk factors. CTN/ACM/LPN reviewed discharge instructions, medical action plan and red flags related to discharge diagnosis. Reviewed and educated them on any new and changed medications related to discharge diagnosis. Advised obtaining a 90-day supply of all daily and as-needed medications. Advance Care Planning:   Does patient have an Advance Directive: health care decision makers updated    Education provided regarding infection prevention, and signs and symptoms of COVID-19 and when to seek medical attention with patient who verbalized understanding. Discussed exposure protocols and quarantine from [address] you at higher risk for severe illness  and given an opportunity for questions and concerns. The patient agrees to contact the COVID-19 hotline [phone] or PCP office for questions related to their healthcare. CTN/ACM/LPN provided contact information for future reference. From CDC: Are you at higher risk for severe illness?  Wash your hands often.  Avoid close contact (6 feet, which is about two arm lengths) with people who are sick.  Put distance between yourself and other people if COVID-19 is spreading in your community.  Clean and disinfect frequently touched surfaces.  Avoid all cruise travel and non-essential air travel.  Call your healthcare professional if you have concerns about COVID-19 and your underlying condition or if you are sick.     For more information on steps you can take to protect yourself, see CDC's How to Protect Yourself      Patient/family/caregiver given information for GetWell Loop and agrees to enroll yes  Patient's preferred e-mail:  [email or web address]   Patient's preferred phone number: [phone]  Based on Loop alert triggers, patient will be contacted by nurse care manager for worsening symptoms. Pt will be further monitored by COVID Loop Team, pending account activation by patient.  based on severity of symptoms and risk factors.

## 2021-01-04 NOTE — DISCHARGE INSTRUCTIONS

## 2021-01-05 ENCOUNTER — APPOINTMENT (OUTPATIENT)
Dept: GENERAL RADIOLOGY | Age: 61
DRG: 177 | End: 2021-01-05
Attending: STUDENT IN AN ORGANIZED HEALTH CARE EDUCATION/TRAINING PROGRAM
Payer: COMMERCIAL

## 2021-01-05 ENCOUNTER — HOSPITAL ENCOUNTER (INPATIENT)
Age: 61
LOS: 5 days | Discharge: HOME | DRG: 177 | End: 2021-01-10
Attending: STUDENT IN AN ORGANIZED HEALTH CARE EDUCATION/TRAINING PROGRAM | Admitting: HOSPITALIST
Payer: COMMERCIAL

## 2021-01-05 DIAGNOSIS — N17.9 AKI (ACUTE KIDNEY INJURY) (HCC): ICD-10-CM

## 2021-01-05 DIAGNOSIS — J12.82 PNEUMONIA DUE TO COVID-19 VIRUS: Primary | ICD-10-CM

## 2021-01-05 DIAGNOSIS — J96.01 ACUTE RESPIRATORY FAILURE WITH HYPOXIA (HCC): ICD-10-CM

## 2021-01-05 DIAGNOSIS — U07.1 PNEUMONIA DUE TO COVID-19 VIRUS: Primary | ICD-10-CM

## 2021-01-05 PROBLEM — R74.01 TRANSAMINITIS: Status: ACTIVE | Noted: 2021-01-05

## 2021-01-05 LAB
ALBUMIN SERPL-MCNC: 3 G/DL (ref 3.5–5)
ALBUMIN/GLOB SERPL: 0.9 {RATIO} (ref 1.1–2.2)
ALP SERPL-CCNC: 127 U/L (ref 45–117)
ALT SERPL-CCNC: 144 U/L (ref 12–78)
ANION GAP SERPL CALC-SCNC: 6 MMOL/L (ref 5–15)
APTT PPP: 27.1 SEC (ref 22.1–31)
AST SERPL-CCNC: 127 U/L (ref 15–37)
ATRIAL RATE: 84 BPM
BASOPHILS # BLD: 0 K/UL (ref 0–0.1)
BASOPHILS NFR BLD: 0 % (ref 0–1)
BILIRUB SERPL-MCNC: 0.9 MG/DL (ref 0.2–1)
BUN SERPL-MCNC: 13 MG/DL (ref 6–20)
BUN/CREAT SERPL: 10 (ref 12–20)
CALCIUM SERPL-MCNC: 8.1 MG/DL (ref 8.5–10.1)
CALCULATED P AXIS, ECG09: 56 DEGREES
CALCULATED R AXIS, ECG10: 12 DEGREES
CALCULATED T AXIS, ECG11: 53 DEGREES
CHLORIDE SERPL-SCNC: 105 MMOL/L (ref 97–108)
CO2 SERPL-SCNC: 25 MMOL/L (ref 21–32)
CREAT SERPL-MCNC: 1.24 MG/DL (ref 0.55–1.02)
CRP SERPL-MCNC: 9.08 MG/DL (ref 0–0.6)
D DIMER PPP FEU-MCNC: 1.09 MG/L FEU (ref 0–0.65)
DIAGNOSIS, 93000: NORMAL
DIFFERENTIAL METHOD BLD: ABNORMAL
EOSINOPHIL # BLD: 0 K/UL (ref 0–0.4)
EOSINOPHIL NFR BLD: 0 % (ref 0–7)
ERYTHROCYTE [DISTWIDTH] IN BLOOD BY AUTOMATED COUNT: 12.7 % (ref 11.5–14.5)
FIBRINOGEN PPP-MCNC: 498 MG/DL (ref 200–475)
GLOBULIN SER CALC-MCNC: 3.5 G/DL (ref 2–4)
GLUCOSE SERPL-MCNC: 128 MG/DL (ref 65–100)
HCT VFR BLD AUTO: 36.7 % (ref 35–47)
HGB BLD-MCNC: 12.8 G/DL (ref 11.5–16)
IMM GRANULOCYTES # BLD AUTO: 0 K/UL (ref 0–0.04)
IMM GRANULOCYTES NFR BLD AUTO: 1 % (ref 0–0.5)
INR PPP: 1.1 (ref 0.9–1.1)
LACTATE SERPL-SCNC: 1.5 MMOL/L (ref 0.4–2)
LDH SERPL L TO P-CCNC: 467 U/L (ref 81–246)
LYMPHOCYTES # BLD: 0.9 K/UL (ref 0.8–3.5)
LYMPHOCYTES NFR BLD: 16 % (ref 12–49)
MCH RBC QN AUTO: 29.3 PG (ref 26–34)
MCHC RBC AUTO-ENTMCNC: 34.9 G/DL (ref 30–36.5)
MCV RBC AUTO: 84 FL (ref 80–99)
MONOCYTES # BLD: 0.3 K/UL (ref 0–1)
MONOCYTES NFR BLD: 5 % (ref 5–13)
NEUTS SEG # BLD: 4.6 K/UL (ref 1.8–8)
NEUTS SEG NFR BLD: 78 % (ref 32–75)
NRBC # BLD: 0 K/UL (ref 0–0.01)
NRBC BLD-RTO: 0 PER 100 WBC
P-R INTERVAL, ECG05: 134 MS
PLATELET # BLD AUTO: 208 K/UL (ref 150–400)
PMV BLD AUTO: 9.3 FL (ref 8.9–12.9)
POTASSIUM SERPL-SCNC: 3.5 MMOL/L (ref 3.5–5.1)
PROCALCITONIN SERPL-MCNC: 0.14 NG/ML
PROT SERPL-MCNC: 6.5 G/DL (ref 6.4–8.2)
PROTHROMBIN TIME: 11.5 SEC (ref 9–11.1)
Q-T INTERVAL, ECG07: 344 MS
QRS DURATION, ECG06: 92 MS
QTC CALCULATION (BEZET), ECG08: 406 MS
RBC # BLD AUTO: 4.37 M/UL (ref 3.8–5.2)
SODIUM SERPL-SCNC: 136 MMOL/L (ref 136–145)
THERAPEUTIC RANGE,PTTT: NORMAL SECS (ref 58–77)
TROPONIN I SERPL-MCNC: <0.05 NG/ML
VENTRICULAR RATE, ECG03: 84 BPM
WBC # BLD AUTO: 5.8 K/UL (ref 3.6–11)

## 2021-01-05 PROCEDURE — 99285 EMERGENCY DEPT VISIT HI MDM: CPT

## 2021-01-05 PROCEDURE — XW033E5 INTRODUCTION OF REMDESIVIR ANTI-INFECTIVE INTO PERIPHERAL VEIN, PERCUTANEOUS APPROACH, NEW TECHNOLOGY GROUP 5: ICD-10-PCS | Performed by: HOSPITALIST

## 2021-01-05 PROCEDURE — 65660000000 HC RM CCU STEPDOWN

## 2021-01-05 PROCEDURE — 74011250636 HC RX REV CODE- 250/636: Performed by: STUDENT IN AN ORGANIZED HEALTH CARE EDUCATION/TRAINING PROGRAM

## 2021-01-05 PROCEDURE — 80053 COMPREHEN METABOLIC PANEL: CPT

## 2021-01-05 PROCEDURE — 74011250636 HC RX REV CODE- 250/636: Performed by: HOSPITALIST

## 2021-01-05 PROCEDURE — 71045 X-RAY EXAM CHEST 1 VIEW: CPT

## 2021-01-05 PROCEDURE — 87040 BLOOD CULTURE FOR BACTERIA: CPT

## 2021-01-05 PROCEDURE — 85384 FIBRINOGEN ACTIVITY: CPT

## 2021-01-05 PROCEDURE — 74011000250 HC RX REV CODE- 250: Performed by: HOSPITALIST

## 2021-01-05 PROCEDURE — 85379 FIBRIN DEGRADATION QUANT: CPT

## 2021-01-05 PROCEDURE — 74011250637 HC RX REV CODE- 250/637: Performed by: HOSPITALIST

## 2021-01-05 PROCEDURE — 85730 THROMBOPLASTIN TIME PARTIAL: CPT

## 2021-01-05 PROCEDURE — 83615 LACTATE (LD) (LDH) ENZYME: CPT

## 2021-01-05 PROCEDURE — 83605 ASSAY OF LACTIC ACID: CPT

## 2021-01-05 PROCEDURE — 74011000258 HC RX REV CODE- 258: Performed by: HOSPITALIST

## 2021-01-05 PROCEDURE — 96374 THER/PROPH/DIAG INJ IV PUSH: CPT

## 2021-01-05 PROCEDURE — 84145 PROCALCITONIN (PCT): CPT

## 2021-01-05 PROCEDURE — 86140 C-REACTIVE PROTEIN: CPT

## 2021-01-05 PROCEDURE — 84484 ASSAY OF TROPONIN QUANT: CPT

## 2021-01-05 PROCEDURE — 93005 ELECTROCARDIOGRAM TRACING: CPT

## 2021-01-05 PROCEDURE — 36415 COLL VENOUS BLD VENIPUNCTURE: CPT

## 2021-01-05 PROCEDURE — 85025 COMPLETE CBC W/AUTO DIFF WBC: CPT

## 2021-01-05 PROCEDURE — 85610 PROTHROMBIN TIME: CPT

## 2021-01-05 RX ORDER — ACETAMINOPHEN 325 MG/1
650 TABLET ORAL
Status: DISCONTINUED | OUTPATIENT
Start: 2021-01-05 | End: 2021-01-05

## 2021-01-05 RX ORDER — POLYETHYLENE GLYCOL 3350 17 G/17G
17 POWDER, FOR SOLUTION ORAL DAILY PRN
Status: DISCONTINUED | OUTPATIENT
Start: 2021-01-05 | End: 2021-01-10 | Stop reason: HOSPADM

## 2021-01-05 RX ORDER — ENOXAPARIN SODIUM 100 MG/ML
40 INJECTION SUBCUTANEOUS DAILY
Status: DISCONTINUED | OUTPATIENT
Start: 2021-01-05 | End: 2021-01-07

## 2021-01-05 RX ORDER — PROMETHAZINE HYDROCHLORIDE 25 MG/1
12.5 TABLET ORAL
Status: DISCONTINUED | OUTPATIENT
Start: 2021-01-05 | End: 2021-01-10 | Stop reason: HOSPADM

## 2021-01-05 RX ORDER — LUBIPROSTONE 8 UG/1
8 CAPSULE, GELATIN COATED ORAL 2 TIMES DAILY WITH MEALS
COMMUNITY
End: 2021-04-19 | Stop reason: ALTCHOICE

## 2021-01-05 RX ORDER — ASCORBIC ACID 500 MG
500 TABLET ORAL 2 TIMES DAILY
Status: DISCONTINUED | OUTPATIENT
Start: 2021-01-05 | End: 2021-01-10 | Stop reason: HOSPADM

## 2021-01-05 RX ORDER — LEVOFLOXACIN 5 MG/ML
750 INJECTION, SOLUTION INTRAVENOUS
Status: DISCONTINUED | OUTPATIENT
Start: 2021-01-05 | End: 2021-01-07

## 2021-01-05 RX ORDER — DEXAMETHASONE SODIUM PHOSPHATE 4 MG/ML
6 INJECTION, SOLUTION INTRA-ARTICULAR; INTRALESIONAL; INTRAMUSCULAR; INTRAVENOUS; SOFT TISSUE DAILY
Status: DISCONTINUED | OUTPATIENT
Start: 2021-01-06 | End: 2021-01-07

## 2021-01-05 RX ORDER — FAMOTIDINE 20 MG/1
40 TABLET, FILM COATED ORAL DAILY
Status: DISCONTINUED | OUTPATIENT
Start: 2021-01-06 | End: 2021-01-10 | Stop reason: HOSPADM

## 2021-01-05 RX ORDER — MORPHINE SULFATE 2 MG/ML
1 INJECTION, SOLUTION INTRAMUSCULAR; INTRAVENOUS
Status: DISCONTINUED | OUTPATIENT
Start: 2021-01-05 | End: 2021-01-10 | Stop reason: HOSPADM

## 2021-01-05 RX ORDER — LANOLIN ALCOHOL/MO/W.PET/CERES
3 CREAM (GRAM) TOPICAL
Status: DISCONTINUED | OUTPATIENT
Start: 2021-01-05 | End: 2021-01-10 | Stop reason: HOSPADM

## 2021-01-05 RX ORDER — SODIUM CHLORIDE 0.9 % (FLUSH) 0.9 %
5-40 SYRINGE (ML) INJECTION AS NEEDED
Status: DISCONTINUED | OUTPATIENT
Start: 2021-01-05 | End: 2021-01-10 | Stop reason: HOSPADM

## 2021-01-05 RX ORDER — SODIUM CHLORIDE 9 MG/ML
100 INJECTION, SOLUTION INTRAVENOUS CONTINUOUS
Status: DISPENSED | OUTPATIENT
Start: 2021-01-05 | End: 2021-01-06

## 2021-01-05 RX ORDER — ALBUTEROL SULFATE 90 UG/1
2 AEROSOL, METERED RESPIRATORY (INHALATION)
Status: DISCONTINUED | OUTPATIENT
Start: 2021-01-05 | End: 2021-01-07

## 2021-01-05 RX ORDER — BUPROPION HYDROCHLORIDE 150 MG/1
150 TABLET, EXTENDED RELEASE ORAL 2 TIMES DAILY
Status: DISCONTINUED | OUTPATIENT
Start: 2021-01-05 | End: 2021-01-10 | Stop reason: HOSPADM

## 2021-01-05 RX ORDER — SODIUM CHLORIDE 9 MG/ML
125 INJECTION, SOLUTION INTRAVENOUS CONTINUOUS
Status: DISCONTINUED | OUTPATIENT
Start: 2021-01-05 | End: 2021-01-05

## 2021-01-05 RX ORDER — ONDANSETRON 2 MG/ML
4 INJECTION INTRAMUSCULAR; INTRAVENOUS
Status: DISCONTINUED | OUTPATIENT
Start: 2021-01-05 | End: 2021-01-10 | Stop reason: HOSPADM

## 2021-01-05 RX ORDER — ACETAMINOPHEN 325 MG/1
650 TABLET ORAL
Status: DISCONTINUED | OUTPATIENT
Start: 2021-01-05 | End: 2021-01-10 | Stop reason: HOSPADM

## 2021-01-05 RX ORDER — ONDANSETRON 2 MG/ML
4 INJECTION INTRAMUSCULAR; INTRAVENOUS
Status: DISCONTINUED | OUTPATIENT
Start: 2021-01-05 | End: 2021-01-05

## 2021-01-05 RX ORDER — ACETAMINOPHEN 650 MG/1
650 SUPPOSITORY RECTAL
Status: DISCONTINUED | OUTPATIENT
Start: 2021-01-05 | End: 2021-01-10 | Stop reason: HOSPADM

## 2021-01-05 RX ORDER — FAMOTIDINE 20 MG/1
40 TABLET, FILM COATED ORAL 2 TIMES DAILY
Status: DISCONTINUED | OUTPATIENT
Start: 2021-01-05 | End: 2021-01-05

## 2021-01-05 RX ORDER — MELATONIN
2000 DAILY
Status: DISCONTINUED | OUTPATIENT
Start: 2021-01-05 | End: 2021-01-10 | Stop reason: HOSPADM

## 2021-01-05 RX ORDER — ZINC SULFATE 50(220)MG
1 CAPSULE ORAL 2 TIMES DAILY
Status: DISCONTINUED | OUTPATIENT
Start: 2021-01-05 | End: 2021-01-10 | Stop reason: HOSPADM

## 2021-01-05 RX ORDER — DEXAMETHASONE SODIUM PHOSPHATE 4 MG/ML
6 INJECTION, SOLUTION INTRA-ARTICULAR; INTRALESIONAL; INTRAMUSCULAR; INTRAVENOUS; SOFT TISSUE ONCE
Status: COMPLETED | OUTPATIENT
Start: 2021-01-05 | End: 2021-01-05

## 2021-01-05 RX ORDER — SODIUM CHLORIDE 0.9 % (FLUSH) 0.9 %
5-40 SYRINGE (ML) INJECTION EVERY 8 HOURS
Status: DISCONTINUED | OUTPATIENT
Start: 2021-01-05 | End: 2021-01-10 | Stop reason: HOSPADM

## 2021-01-05 RX ADMIN — LEVOFLOXACIN 750 MG: 5 INJECTION, SOLUTION INTRAVENOUS at 18:59

## 2021-01-05 RX ADMIN — SODIUM CHLORIDE 100 ML/HR: 9 INJECTION, SOLUTION INTRAVENOUS at 18:52

## 2021-01-05 RX ADMIN — CEFTRIAXONE 1 G: 1 INJECTION, POWDER, FOR SOLUTION INTRAMUSCULAR; INTRAVENOUS at 17:47

## 2021-01-05 RX ADMIN — OXYCODONE HYDROCHLORIDE AND ACETAMINOPHEN 500 MG: 500 TABLET ORAL at 19:02

## 2021-01-05 RX ADMIN — MORPHINE SULFATE 1 MG: 2 INJECTION, SOLUTION INTRAMUSCULAR; INTRAVENOUS at 23:41

## 2021-01-05 RX ADMIN — Medication 10 ML: at 23:37

## 2021-01-05 RX ADMIN — CHOLECALCIFEROL TAB 25 MCG (1000 UNIT) 2 TABLET: 25 TAB at 19:08

## 2021-01-05 RX ADMIN — MELATONIN 3 MG: at 21:52

## 2021-01-05 RX ADMIN — ALBUTEROL SULFATE 2 PUFF: 90 AEROSOL, METERED RESPIRATORY (INHALATION) at 21:52

## 2021-01-05 RX ADMIN — SODIUM CHLORIDE 100 ML/HR: 9 INJECTION, SOLUTION INTRAVENOUS at 21:52

## 2021-01-05 RX ADMIN — ZINC SULFATE 220 MG (50 MG) CAPSULE 1 CAPSULE: CAPSULE at 19:02

## 2021-01-05 RX ADMIN — SODIUM CHLORIDE 125 ML/HR: 9 INJECTION, SOLUTION INTRAVENOUS at 12:13

## 2021-01-05 RX ADMIN — BUPROPION HYDROCHLORIDE 150 MG: 150 TABLET, EXTENDED RELEASE ORAL at 19:04

## 2021-01-05 RX ADMIN — ENOXAPARIN SODIUM 40 MG: 40 INJECTION SUBCUTANEOUS at 19:03

## 2021-01-05 RX ADMIN — DEXAMETHASONE SODIUM PHOSPHATE 6 MG: 4 INJECTION, SOLUTION INTRA-ARTICULAR; INTRALESIONAL; INTRAMUSCULAR; INTRAVENOUS; SOFT TISSUE at 12:13

## 2021-01-05 RX ADMIN — REMDESIVIR 200 MG: 100 INJECTION, POWDER, LYOPHILIZED, FOR SOLUTION INTRAVENOUS at 21:21

## 2021-01-05 NOTE — H&P
Hospitalist Admission Note    NAME: Tila Briggs   :  1960   MRN:  [id number]     Date/Time:  2021 6:47 PM    Patient PCP: Pablo Boone MD    Please note that this dictation was completed with [employer], the computer voice recognition software. Quite often unanticipated grammatical, syntax, homophones, and other interpretive errors are inadvertently transcribed by the computer software. Please disregard these errors. Please excuse any errors that have escaped final proofreading  ______________________________________________________________________   Assessment & Plan:  Bilateral Covid-19 pneumonia, POA with  Acute hypoxic respiratory failure, POA 85% RA, requiring 3L O2  --CXR with bilateral mid and lower lung pna. Inflammatory markers elevated CRP 9.09, , fibrinogen 498, D-dimer 1.09  --start decadron 6mg IV daily x 10 days. Put on vitamin D, vitamin C, zinc for 7 days  --empiric abx with rocephin and levaquin. She finished Z-pack yesterday. --remdesivir discussed with patient and she is agreeable  --supplemental O2 3L with humidification, albuterol inhaler q6h    JULIEN on cpap at home  --ordered cpap with home pressure setting 7cm2 H20 qhs    Acute transaminitis, POA  --AST, ALT, and ALP acutely elevated, likely due to covid infection  --monitor cmp daily while on remdesivir    Mild acute kidney injury, Cr 1.2, baseline 0.76 in 2016  Acquired solitary kidney s/p right nephrectomy 2018 for renal cell CA  --gentle IVF x 1 day. Hold hctz    Diarrhea due to covid-19  --hold amitiza    Hx diverticulitis s/p sigmoid colectomy  --hold amitiza due to diarrhea    Obesity  Body mass index is 36.4 kg/m².     Code: discussed, full  DVT prophylaxis:  lovenox  Surrogate decision maker:   Rodri          Subjective:   CHIEF COMPLAINT:  SOB,     HISTORY OF PRESENT ILLNESS:     Tila Briggs is a 64 y.o. female with HTN, JULIEN, CKD, hx renal cell CA s/p right nephrectomy who presents with complaint noted above. She was diagnosed with Covid infection on , but has been having symptoms since  with fever, chills, generalized weakness, nausea, diarrhea. He took a 5-day course of azithromycin and finished it yesterday. She was seen in the emergency room 2 days ago for diarrhea and fever of 102.8. Today she developed shortness of breath and chest tightness. In the ER she is hypoxic 85% on room air, requiring 3 L of oxygen. Chest x-ray shows new, extensive bilateral interstitial and patchy airspace disease bilateral lateral mid and lower lungs. We were asked to admit for work up and evaluation of the above problems. Past Medical History:   Diagnosis Date    Chronic kidney disease     Diverticulosis     GERD (gastroesophageal reflux disease)     Hypertension     Renal carcinoma, right ([address]     kidney    Sleep apnea       Past Surgical History:   Procedure Laterality Date    HX CERVICAL DISKECTOMY      HX  SECTION   and     HX CHOLECYSTECTOMY      HX CHOLECYSTECTOMY      HX GYN      ablation    HX GYN  2015    hysterectomy    HX NEPHRECTOMY Right 2018    HX ORTHOPAEDIC      Neck Surgery.  HX ORTHOPAEDIC Left     knee surgery x 3    HX OTHER SURGICAL  2016    colon resection    HX ROTATOR CUFF REPAIR Right     HX TUMOR REMOVAL  2018    R Kidney      Social History     Tobacco Use    Smoking status: Never Smoker    Smokeless tobacco: Never Used   Substance Use Topics    Alcohol use: Yes     Comment: OCC      Family History   Problem Relation Age of Onset    Hypertension Mother     Hypertension Father     Diabetes Sister     Anesth Problems Neg Hx       No Known Allergies     Prior to Admission medications    Medication Sig Start Date End Date Taking? Authorizing Provider   lubiPROStone (Amitiza) 8 mcg capsule Take 8 mcg by mouth two (2) times daily (with meals).    Yes Provider, Historical loperamide (IMODIUM) 2 mg capsule Take 1 Cap by mouth four (4) times daily as needed for Diarrhea for up to 10 days. 1/3/21 1/13/21 Yes Kaushal Wei MD   bran/gum/fib/celina/psyl/kelp/pec (FIBER 6 PO) fiber   Yes Provider, Historical   buPROPion SR (WELLBUTRIN SR) 150 mg SR tablet Take 150 mg by mouth two (2) times a day. 10/29/19  Yes Provider, Historical   famotidine (PEPCID) 40 mg tablet Take 40 mg by mouth two (2) times a day. 19  Yes Provider, Historical   hydroCHLOROthiazide (HYDRODIURIL) 25 mg tablet Take 1/2 tablet by mouth once a day   Yes Provider, Historical   B.infantis-B.ani-B.long-B.bifi (PROBIOTIC 4X) 10-15 mg TbEC Take 10-15 mg by mouth daily. Yes Provider, Historical     REVIEW OF SYSTEMS:  POSITIVE= Bold. Negative = normal text  General:  fever, chills, sweats, generalized weakness, weight loss/gain, loss of appetite  Eyes:  blurred vision, eye pain, loss of vision, diplopia  Ear Nose and Throat:  rhinorrhea, pharyngitis  Respiratory:   cough, sputum production, SOB, wheezing, CHIU, pleuritic pain  Cardiology:  chest pain, palpitations, orthopnea, PND, edema, syncope   Gastrointestinal:  abdominal pain, N/V, dysphagia, diarrhea, constipation, bleeding  Genitourinary:  frequency, urgency, dysuria, hematuria, incontinence  Muskuloskeletal :  arthralgia, myalgia  Hematology:  easy bruising, bleeding, lymphadenopathy  Dermatological:  rash, ulceration, pruritis  Endocrine:  hot flashes or polydipsia  Neurological:  headache, dizziness, confusion, focal weakness, paresthesia, memory loss, gait disturbance  Psychological: anxiety, depression, agitation      Objective:   VITALS:    Visit Vitals  /61   Pulse 91   Temp 99.4 °F (37.4 °C)   Resp 16   Ht 5' 3\" (1.6 m)   Wt 93.2 kg (205 lb 7.5 oz)   SpO2 93%   BMI 36.40 kg/m²     Temp (24hrs), Av.4 °F (37.4 °C), Min:99.4 °F (37.4 °C), Max:99.4 °F (37.4 °C)    Body mass index is 36.4 kg/m².   PHYSICAL EXAM:    General:    Alert, obese, cooperative, no distress, appears stated age. HEENT: Atraumatic, anicteric sclerae, pink conjunctivae     No oral ulcers, mucosa moist, throat clear. Hearing intact. Neck:  Supple, symmetrical,  thyroid: non tender  Lungs:   Bilateral crackles and rhonchi lower lungs to mid lungs. No Wheezing or Rhonchi. Chest wall:  No tenderness  No Accessory muscle use. Heart:   Regular  rhythm,  No  murmur   No gallop. No edema. Abdomen:   Soft, non-tender. Not distended. Bowel sounds normal. No masses  Extremities: No cyanosis. No clubbing  Skin:     Not pale Not Jaundiced  No rashes   Psych:  Good insight. Not depressed. Not anxious or agitated. Neurologic: EOMs intact. No facial asymmetry. No aphasia or slurred speech. Symmetrical strength, Alert and oriented X 3.    Peripheral pulse: Bilateral, DP, 2+  Capillary refill:  normal    IMAGING RESULTS:   []       I have personally reviewed the actual   []     CXR  []     CT scan  CXR:  CT :  EKG:   ________________________________________________________________________  Care Plan discussed with:    Comments   Patient y    [facility]:      ________________________________________________________________________  Prophylaxis:  GI famotidine   DVT lovenox   ________________________________________________________________________  Recommended Disposition:   Home with Family y   HH/PT/OT/RN    SNF/LTC    [facility]    ________________________________________________________________________  Code Status:  Full Code y   DNR/DNI    ________________________________________________________________________  TOTAL TIME:  60 minutes      Comments     Reviewed previous records   >50% of visit spent in counseling and coordination of care  Discussion with patient and/or family and questions answered         ______________________________________________________________________  Raul hKan MD      Procedures: see electronic medical records for all procedures/Xrays and details which were not copied into this note but were reviewed prior to creation of Plan. LAB DATA REVIEWED:    Recent Results (from the past 24 hour(s))   EKG, 12 LEAD, INITIAL    Collection Time: 01/05/21  9:37 AM   Result Value Ref Range    Ventricular Rate 84 BPM    Atrial Rate 84 BPM    P-R Interval 134 ms    QRS Duration 92 ms    Q-T Interval 344 ms    QTC Calculation (Bezet) 406 ms    Calculated P Axis 56 degrees    Calculated R Axis 12 degrees    Calculated T Axis 53 degrees    Diagnosis       Normal sinus rhythm  Nonspecific ST and T wave abnormality  When compared with ECG of 17-NOV-2016 16:33,  No significant change was found     CBC WITH AUTOMATED DIFF    Collection Time: 01/05/21  9:44 AM   Result Value Ref Range    WBC 5.8 3.6 - 11.0 K/uL    RBC 4.37 3.80 - 5.20 M/uL    HGB 12.8 11.5 - 16.0 g/dL    HCT 36.7 35.0 - 47.0 %    MCV 84.0 80.0 - 99.0 FL    MCH 29.3 26.0 - 34.0 PG    MCHC 34.9 30.0 - 36.5 g/dL    RDW 12.7 11.5 - 14.5 %    PLATELET 145 674 - 863 K/uL    MPV 9.3 8.9 - 12.9 FL    NRBC 0.0 0  WBC    ABSOLUTE NRBC 0.00 0.00 - 0.01 K/uL    NEUTROPHILS 78 (H) 32 - 75 %    LYMPHOCYTES 16 12 - 49 %    MONOCYTES 5 5 - 13 %    EOSINOPHILS 0 0 - 7 %    BASOPHILS 0 0 - 1 %    IMMATURE GRANULOCYTES 1 (H) 0.0 - 0.5 %    ABS. NEUTROPHILS 4.6 1.8 - 8.0 K/UL    ABS. LYMPHOCYTES 0.9 0.8 - 3.5 K/UL    ABS. MONOCYTES 0.3 0.0 - 1.0 K/UL    ABS. EOSINOPHILS 0.0 0.0 - 0.4 K/UL    ABS. BASOPHILS 0.0 0.0 - 0.1 K/UL    ABS. IMM.  GRANS. 0.0 0.00 - 0.04 K/UL    DF AUTOMATED     METABOLIC PANEL, COMPREHENSIVE    Collection Time: 01/05/21  9:44 AM   Result Value Ref Range    Sodium 136 136 - 145 mmol/L    Potassium 3.5 3.5 - 5.1 mmol/L    Chloride 105 97 - 108 mmol/L    CO2 25 21 - 32 mmol/L    Anion gap 6 5 - 15 mmol/L    Glucose 128 (H) 65 - 100 mg/dL    BUN 13 6 - 20 MG/DL    Creatinine 1.24 (H) 0.55 - 1.02 MG/DL    BUN/Creatinine ratio 10 (L) 12 - 20      GFR est AA 54 (L) >60 ml/min/1.73m2    GFR est non-AA 44 (L) >60 ml/min/1.73m2    Calcium 8.1 (L) 8.5 - 10.1 MG/DL    Bilirubin, total 0.9 0.2 - 1.0 MG/DL    ALT (SGPT) 144 (H) 12 - 78 U/L    AST (SGOT) 127 (H) 15 - 37 U/L    Alk.  phosphatase 127 (H) 45 - 117 U/L    Protein, total 6.5 6.4 - 8.2 g/dL    Albumin 3.0 (L) 3.5 - 5.0 g/dL    Globulin 3.5 2.0 - 4.0 g/dL    A-G Ratio 0.9 (L) 1.1 - 2.2     TROPONIN I    Collection Time: 01/05/21  9:44 AM   Result Value Ref Range    Troponin-I, Qt. <0.05 <0.05 ng/mL   FIBRINOGEN    Collection Time: 01/05/21 11:50 AM   Result Value Ref Range    Fibrinogen 498 (H) 200 - 475 mg/dL   PROCALCITONIN    Collection Time: 01/05/21 11:50 AM   Result Value Ref Range    Procalcitonin 0.14 ng/mL   C REACTIVE PROTEIN, QT    Collection Time: 01/05/21 11:50 AM   Result Value Ref Range    C-Reactive protein 9.08 (H) 0.00 - 0.60 mg/dL   LD    Collection Time: 01/05/21 11:50 AM   Result Value Ref Range     (H) 81 - 246 U/L   D DIMER    Collection Time: 01/05/21 11:50 AM   Result Value Ref Range    D-dimer 1.09 (H) 0.00 - 0.65 mg/L FEU   PROTHROMBIN TIME + INR    Collection Time: 01/05/21 11:50 AM   Result Value Ref Range    INR 1.1 0.9 - 1.1      Prothrombin time 11.5 (H) 9.0 - 11.1 sec   PTT    Collection Time: 01/05/21 11:50 AM   Result Value Ref Range    aPTT 27.1 22.1 - 31.0 sec    aPTT, therapeutic range     58.0 - 77.0 SECS   LACTIC ACID    Collection Time: 01/05/21  5:34 PM   Result Value Ref Range    Lactic acid 1.5 0.4 - 2.0 MMOL/L

## 2021-01-05 NOTE — ED TRIAGE NOTES
Pt c/o chest pain and shortness of breath, \"I feel like I can't take a deep breath\". Seen here Sunday for the same. Covid test + this past Thursday. Also c/o nausea and fever.

## 2021-01-05 NOTE — ED PROVIDER NOTES
EMERGENCY DEPARTMENT HISTORY AND PHYSICAL EXAM      Date: 1/5/2021  Patient Name: Ramon Kaur    History of Presenting Illness     Chief Complaint   Patient presents with    Chest Pain    Shortness of Breath       History Provided By: Patient    HPI: Ramon Kaur, 64 y.o. female presents to the ED with cc of cough shortness of breath, chest pain, fevers and chills. Patient tested positive for Covid this past Thursday. States that her symptoms have been present over the past 9 days. She states that initially she was only having fevers, chills, and GI complaints, with more severe shortness of breath that began over the past several days only. There are no other complaints, changes, or physical findings at this time. PCP: Natalie Cheadle, MD    No current facility-administered medications on file prior to encounter. Current Outpatient Medications on File Prior to Encounter   Medication Sig Dispense Refill    ondansetron (Zofran ODT) 4 mg disintegrating tablet Take 1 Tab by mouth every eight (8) hours as needed for Nausea. 10 Tab 0    famotidine (Pepcid) 20 mg tablet Take 1 Tab by mouth two (2) times a day. 20 Tab 0    loperamide (IMODIUM) 2 mg capsule Take 1 Cap by mouth four (4) times daily as needed for Diarrhea for up to 10 days. 20 Cap 0    bran/gum/fib/celina/psyl/kelp/pec (FIBER 6 PO) fiber      buPROPion SR (WELLBUTRIN SR) 150 mg SR tablet       famotidine (PEPCID) 40 mg tablet       omeprazole (PRILOSEC) 20 mg capsule       grape seed extract (GRAPE SEED PO) Take  by mouth.  hydroCHLOROthiazide (HYDRODIURIL) 25 mg tablet Take 1/2 tablet by mouth once a day      B.infantis-B.ani-B.long-B.bifi (PROBIOTIC 4X) 10-15 mg TbEC Take 10-15 mg by mouth daily.          Past History     Past Medical History:  Past Medical History:   Diagnosis Date    Chronic kidney disease     Diverticulosis     GERD (gastroesophageal reflux disease)     Hypertension     Renal carcinoma, right ([address] kidney    Sleep apnea        Past Surgical History:  Past Surgical History:   Procedure Laterality Date    HX CERVICAL DISKECTOMY  2003    HX  SECTION   and     HX CHOLECYSTECTOMY      HX CHOLECYSTECTOMY      HX GYN      ablation    HX GYN  2015    hysterectomy    HX ORTHOPAEDIC      Neck Surgery.  HX ORTHOPAEDIC Left     knee surgery x 3    HX OTHER SURGICAL  2016    colon resection    HX ROTATOR CUFF REPAIR Right 2006    HX TUMOR REMOVAL  2018    R Kidney        Family History:  Family History   Problem Relation Age of Onset    Hypertension Mother     Hypertension Father     Diabetes Sister     Anesth Problems Neg Hx        Social History:  Social History     Tobacco Use    Smoking status: Never Smoker    Smokeless tobacco: Never Used   Substance Use Topics    Alcohol use: Yes     Comment: OCC    Drug use: No       Allergies:  No Known Allergies      Review of Systems   Review of Systems   Constitutional: Positive for chills and fever. HENT: Negative for congestion and rhinorrhea. Eyes: Negative for visual disturbance. Respiratory: Positive for cough and shortness of breath. Negative for chest tightness and wheezing. Cardiovascular: Positive for chest pain. Gastrointestinal: Negative for abdominal pain, diarrhea, nausea and vomiting. Genitourinary: Negative for dysuria, flank pain and hematuria. Musculoskeletal: Negative for back pain and neck pain. Skin: Negative for rash. Allergic/Immunologic: Negative for immunocompromised state. Neurological: Negative for dizziness, speech difficulty, weakness and headaches. Hematological: Negative for adenopathy. Psychiatric/Behavioral: Negative for dysphoric mood and suicidal ideas. Physical Exam   Physical Exam  Vitals signs and nursing note reviewed. Constitutional:       General: She is not in acute distress. Appearance: She is not ill-appearing or toxic-appearing.    HENT:      Head: Normocephalic and atraumatic. Nose: Nose normal.      Mouth/Throat:      Mouth: Mucous membranes are moist.   Eyes:      Extraocular Movements: Extraocular movements intact. Pupils: Pupils are equal, round, and reactive to light. Neck:      Musculoskeletal: Normal range of motion and neck supple. Cardiovascular:      Rate and Rhythm: Normal rate and regular rhythm. Pulses: Normal pulses. Pulmonary:      Effort: Pulmonary effort is normal.      Breath sounds: No stridor. No decreased breath sounds, wheezing or rhonchi. Abdominal:      General: Abdomen is flat. There is no distension. Tenderness: There is no abdominal tenderness. Musculoskeletal: Normal range of motion. Skin:     General: Skin is warm and dry. Neurological:      General: No focal deficit present. Mental Status: She is alert and oriented to person, place, and time.    Psychiatric:         Judgment: Judgment normal.         Diagnostic Study Results     Labs -     Recent Results (from the past 12 hour(s))   EKG, 12 LEAD, INITIAL    Collection Time: 01/05/21  9:37 AM   Result Value Ref Range    Ventricular Rate 84 BPM    Atrial Rate 84 BPM    P-R Interval 134 ms    QRS Duration 92 ms    Q-T Interval 344 ms    QTC Calculation (Bezet) 406 ms    Calculated P Axis 56 degrees    Calculated R Axis 12 degrees    Calculated T Axis 53 degrees    Diagnosis       Normal sinus rhythm  Nonspecific ST and T wave abnormality  When compared with ECG of 17-NOV-2016 16:33,  No significant change was found     CBC WITH AUTOMATED DIFF    Collection Time: 01/05/21  9:44 AM   Result Value Ref Range    WBC 5.8 3.6 - 11.0 K/uL    RBC 4.37 3.80 - 5.20 M/uL    HGB 12.8 11.5 - 16.0 g/dL    HCT 36.7 35.0 - 47.0 %    MCV 84.0 80.0 - 99.0 FL    MCH 29.3 26.0 - 34.0 PG    MCHC 34.9 30.0 - 36.5 g/dL    RDW 12.7 11.5 - 14.5 %    PLATELET 099 736 - 337 K/uL    MPV 9.3 8.9 - 12.9 FL    NRBC 0.0 0  WBC    ABSOLUTE NRBC 0.00 0.00 - 0.01 K/uL NEUTROPHILS 78 (H) 32 - 75 %    LYMPHOCYTES 16 12 - 49 %    MONOCYTES 5 5 - 13 %    EOSINOPHILS 0 0 - 7 %    BASOPHILS 0 0 - 1 %    IMMATURE GRANULOCYTES 1 (H) 0.0 - 0.5 %    ABS. NEUTROPHILS 4.6 1.8 - 8.0 K/UL    ABS. LYMPHOCYTES 0.9 0.8 - 3.5 K/UL    ABS. MONOCYTES 0.3 0.0 - 1.0 K/UL    ABS. EOSINOPHILS 0.0 0.0 - 0.4 K/UL    ABS. BASOPHILS 0.0 0.0 - 0.1 K/UL    ABS. IMM. GRANS. 0.0 0.00 - 0.04 K/UL    DF AUTOMATED     METABOLIC PANEL, COMPREHENSIVE    Collection Time: 01/05/21  9:44 AM   Result Value Ref Range    Sodium 136 136 - 145 mmol/L    Potassium 3.5 3.5 - 5.1 mmol/L    Chloride 105 97 - 108 mmol/L    CO2 25 21 - 32 mmol/L    Anion gap 6 5 - 15 mmol/L    Glucose 128 (H) 65 - 100 mg/dL    BUN 13 6 - 20 MG/DL    Creatinine 1.24 (H) 0.55 - 1.02 MG/DL    BUN/Creatinine ratio 10 (L) 12 - 20      GFR est AA 54 (L) >60 ml/min/1.73m2    GFR est non-AA 44 (L) >60 ml/min/1.73m2    Calcium 8.1 (L) 8.5 - 10.1 MG/DL    Bilirubin, total 0.9 0.2 - 1.0 MG/DL    ALT (SGPT) 144 (H) 12 - 78 U/L    AST (SGOT) 127 (H) 15 - 37 U/L    Alk. phosphatase 127 (H) 45 - 117 U/L    Protein, total 6.5 6.4 - 8.2 g/dL    Albumin 3.0 (L) 3.5 - 5.0 g/dL    Globulin 3.5 2.0 - 4.0 g/dL    A-G Ratio 0.9 (L) 1.1 - 2.2     TROPONIN I    Collection Time: 01/05/21  9:44 AM   Result Value Ref Range    Troponin-I, Qt. <0.05 <0.05 ng/mL       Radiologic Studies -   XR CHEST PORT   Final Result   IMPRESSION: New, extensive bilateral interstitial and patchy airspace disease in   both midlungs and lung bases. CT Results  (Last 48 hours)    None        CXR Results  (Last 48 hours)               01/05/21 1010  XR CHEST PORT Final result    Impression:  IMPRESSION: New, extensive bilateral interstitial and patchy airspace disease in   both midlungs and lung bases. Narrative:  INDICATION: Chest pain, shortness of breath. Portable AP upright view of the chest.       Direct comparison made to prior chest x-ray dated December 24, 2019. Cardiomediastinal silhouette is stable. There is new, extensive bilateral   interstitial and patchy airspace disease in both midlungs and lung bases. There   is no pleural fluid. There is no pneumothorax. Medical Decision Making   I am the first provider for this patient. I reviewed the vital signs, available nursing notes, past medical history, past surgical history, family history and social history. Vital Signs-Reviewed the patient's vital signs. Patient Vitals for the past 12 hrs:   Temp Pulse Resp BP SpO2   01/05/21 0942 99.4 °F (37.4 °C) 91 22 101/62 91 %       EKG interpretation: (Preliminary)  NSR, rate of 84, nonspecific ST changes. QTc of 406. EKG interpretation by Madeline Negrete MD      Records Reviewed: Nursing Notes and Old Medical Records    Provider Notes (Medical Decision Making):   Patient found to be hypoxic, satting 87% on room air on ED arrival.  She was placed on 3 L of O2 with oxygen saturations improving. During my evaluation, patient is speaking full sentences, respirations are regular and unlabored, in no acute respiratory distress. Work-up in the ED is notable for mild ALIA with creatinine 1.24, BUN of 13. Chest x-ray reveal new extensive bilateral interstitial and patchy airspace disease in both mid lungs and lung bases. Inflammatory markers obtained. Per RN, will attempt to request outside facility lab data to confirm Covid positive test result from several days ago as this is not currently in our system. Patient is given 6 mg of IV Decadron for hypoxic respiratory failure likely secondary to COVID-19 pneumonia. She does not currently require antibiotics as she just finished a course of outpatient azithromycin by her PCP. We will also start patient on gentle IV hydration for mild ALIA. She will require admission. Discussed with the hospitalist, Dr. Fawad Abarca, who is accepted the patient for admission in stable condition at this time.     ED Course: Initial assessment performed. The patients presenting problems have been discussed, and they are in agreement with the care plan formulated and outlined with them. I have encouraged them to ask questions as they arise throughout their visit. Fabián Wiley MD      Disposition:  Admit    Diagnosis     Clinical Impression:   1. Pneumonia due to COVID-19 virus    2. Acute respiratory failure with hypoxia (HCC)    3. LAIA (acute kidney injury) [facility])        Attestations:    Fabián Wiley MD    Please note that this dictation was completed with [employer], the computer voice recognition software. Quite often unanticipated grammatical, syntax, homophones, and other interpretive errors are inadvertently transcribed by the computer software. Please disregard these errors. Please excuse any errors that have escaped final proofreading. Thank you.

## 2021-01-05 NOTE — ED NOTES
Spoke with infection control. To find patient covid positive result documentation. Do not need to re-swab patient.

## 2021-01-05 NOTE — ED NOTES
Pt. Resting in bed. Sats dropped to 87% on room air. Pt. Placed on 3L nasal cannula at this time. Sats up to 94%.

## 2021-01-05 NOTE — ED NOTES
Patient keeps getting anxious and stating she feels like we as a hospital team are not doing anything for her. Paged MD Mayra Curry at this time.

## 2021-01-06 LAB
ALBUMIN SERPL-MCNC: 2.7 G/DL (ref 3.5–5)
ALBUMIN/GLOB SERPL: 0.8 {RATIO} (ref 1.1–2.2)
ALP SERPL-CCNC: 110 U/L (ref 45–117)
ALT SERPL-CCNC: 122 U/L (ref 12–78)
ANION GAP SERPL CALC-SCNC: 7 MMOL/L (ref 5–15)
APTT PPP: 28.5 SEC (ref 22.1–31)
AST SERPL-CCNC: 86 U/L (ref 15–37)
BILIRUB SERPL-MCNC: 0.9 MG/DL (ref 0.2–1)
BUN SERPL-MCNC: 11 MG/DL (ref 6–20)
BUN/CREAT SERPL: 12 (ref 12–20)
CALCIUM SERPL-MCNC: 7.7 MG/DL (ref 8.5–10.1)
CHLORIDE SERPL-SCNC: 105 MMOL/L (ref 97–108)
CO2 SERPL-SCNC: 24 MMOL/L (ref 21–32)
CREAT SERPL-MCNC: 0.92 MG/DL (ref 0.55–1.02)
D DIMER PPP FEU-MCNC: 0.53 MG/L FEU (ref 0–0.65)
ERYTHROCYTE [DISTWIDTH] IN BLOOD BY AUTOMATED COUNT: 12.7 % (ref 11.5–14.5)
FIBRINOGEN PPP-MCNC: 545 MG/DL (ref 200–475)
GLOBULIN SER CALC-MCNC: 3.5 G/DL (ref 2–4)
GLUCOSE BLD STRIP.AUTO-MCNC: 110 MG/DL (ref 65–100)
GLUCOSE BLD STRIP.AUTO-MCNC: 116 MG/DL (ref 65–100)
GLUCOSE BLD STRIP.AUTO-MCNC: 152 MG/DL (ref 65–100)
GLUCOSE BLD STRIP.AUTO-MCNC: 94 MG/DL (ref 65–100)
GLUCOSE SERPL-MCNC: 95 MG/DL (ref 65–100)
HCT VFR BLD AUTO: 33.4 % (ref 35–47)
HGB BLD-MCNC: 11.5 G/DL (ref 11.5–16)
INR PPP: 1.1 (ref 0.9–1.1)
MCH RBC QN AUTO: 29.2 PG (ref 26–34)
MCHC RBC AUTO-ENTMCNC: 34.4 G/DL (ref 30–36.5)
MCV RBC AUTO: 84.8 FL (ref 80–99)
NRBC # BLD: 0 K/UL (ref 0–0.01)
NRBC BLD-RTO: 0 PER 100 WBC
PLATELET # BLD AUTO: 226 K/UL (ref 150–400)
PMV BLD AUTO: 9.5 FL (ref 8.9–12.9)
POTASSIUM SERPL-SCNC: 3.5 MMOL/L (ref 3.5–5.1)
PROT SERPL-MCNC: 6.2 G/DL (ref 6.4–8.2)
PROTHROMBIN TIME: 11.9 SEC (ref 9–11.1)
RBC # BLD AUTO: 3.94 M/UL (ref 3.8–5.2)
SERVICE CMNT-IMP: ABNORMAL
SERVICE CMNT-IMP: NORMAL
SODIUM SERPL-SCNC: 136 MMOL/L (ref 136–145)
THERAPEUTIC RANGE,PTTT: NORMAL SECS (ref 58–77)
WBC # BLD AUTO: 5.3 K/UL (ref 3.6–11)

## 2021-01-06 PROCEDURE — 74011250637 HC RX REV CODE- 250/637: Performed by: HOSPITALIST

## 2021-01-06 PROCEDURE — 94660 CPAP INITIATION&MGMT: CPT

## 2021-01-06 PROCEDURE — 5A09357 ASSISTANCE WITH RESPIRATORY VENTILATION, LESS THAN 24 CONSECUTIVE HOURS, CONTINUOUS POSITIVE AIRWAY PRESSURE: ICD-10-PCS | Performed by: STUDENT IN AN ORGANIZED HEALTH CARE EDUCATION/TRAINING PROGRAM

## 2021-01-06 PROCEDURE — 74011250636 HC RX REV CODE- 250/636: Performed by: HOSPITALIST

## 2021-01-06 PROCEDURE — 85379 FIBRIN DEGRADATION QUANT: CPT

## 2021-01-06 PROCEDURE — 77010033678 HC OXYGEN DAILY

## 2021-01-06 PROCEDURE — 85730 THROMBOPLASTIN TIME PARTIAL: CPT

## 2021-01-06 PROCEDURE — 94640 AIRWAY INHALATION TREATMENT: CPT

## 2021-01-06 PROCEDURE — 85610 PROTHROMBIN TIME: CPT

## 2021-01-06 PROCEDURE — 80053 COMPREHEN METABOLIC PANEL: CPT

## 2021-01-06 PROCEDURE — 74011000250 HC RX REV CODE- 250: Performed by: HOSPITALIST

## 2021-01-06 PROCEDURE — 74011000258 HC RX REV CODE- 258: Performed by: HOSPITALIST

## 2021-01-06 PROCEDURE — 65660000000 HC RM CCU STEPDOWN

## 2021-01-06 PROCEDURE — 85027 COMPLETE CBC AUTOMATED: CPT

## 2021-01-06 PROCEDURE — 82962 GLUCOSE BLOOD TEST: CPT

## 2021-01-06 PROCEDURE — 36415 COLL VENOUS BLD VENIPUNCTURE: CPT

## 2021-01-06 PROCEDURE — 85384 FIBRINOGEN ACTIVITY: CPT

## 2021-01-06 RX ADMIN — BUPROPION HYDROCHLORIDE 150 MG: 150 TABLET, EXTENDED RELEASE ORAL at 18:35

## 2021-01-06 RX ADMIN — ENOXAPARIN SODIUM 40 MG: 40 INJECTION SUBCUTANEOUS at 08:26

## 2021-01-06 RX ADMIN — DEXAMETHASONE SODIUM PHOSPHATE 6 MG: 4 INJECTION, SOLUTION INTRAMUSCULAR; INTRAVENOUS at 08:25

## 2021-01-06 RX ADMIN — OXYCODONE HYDROCHLORIDE AND ACETAMINOPHEN 500 MG: 500 TABLET ORAL at 18:35

## 2021-01-06 RX ADMIN — CHOLECALCIFEROL TAB 25 MCG (1000 UNIT) 2 TABLET: 25 TAB at 08:25

## 2021-01-06 RX ADMIN — BUPROPION HYDROCHLORIDE 150 MG: 150 TABLET, EXTENDED RELEASE ORAL at 10:20

## 2021-01-06 RX ADMIN — ALBUTEROL SULFATE 2 PUFF: 90 AEROSOL, METERED RESPIRATORY (INHALATION) at 19:25

## 2021-01-06 RX ADMIN — SODIUM CHLORIDE 100 ML/HR: 9 INJECTION, SOLUTION INTRAVENOUS at 06:28

## 2021-01-06 RX ADMIN — MORPHINE SULFATE 1 MG: 2 INJECTION, SOLUTION INTRAMUSCULAR; INTRAVENOUS at 05:47

## 2021-01-06 RX ADMIN — CEFTRIAXONE 1 G: 1 INJECTION, POWDER, FOR SOLUTION INTRAMUSCULAR; INTRAVENOUS at 18:35

## 2021-01-06 RX ADMIN — FAMOTIDINE 40 MG: 20 TABLET, FILM COATED ORAL at 08:25

## 2021-01-06 RX ADMIN — ACETAMINOPHEN 650 MG: 325 TABLET ORAL at 02:02

## 2021-01-06 RX ADMIN — Medication 10 ML: at 05:47

## 2021-01-06 RX ADMIN — Medication 10 ML: at 21:18

## 2021-01-06 RX ADMIN — MELATONIN 3 MG: at 21:18

## 2021-01-06 RX ADMIN — REMDESIVIR 100 MG: 100 INJECTION, POWDER, LYOPHILIZED, FOR SOLUTION INTRAVENOUS at 21:18

## 2021-01-06 RX ADMIN — ZINC SULFATE 220 MG (50 MG) CAPSULE 1 CAPSULE: CAPSULE at 08:26

## 2021-01-06 RX ADMIN — OXYCODONE HYDROCHLORIDE AND ACETAMINOPHEN 500 MG: 500 TABLET ORAL at 08:26

## 2021-01-06 RX ADMIN — ZINC SULFATE 220 MG (50 MG) CAPSULE 1 CAPSULE: CAPSULE at 18:35

## 2021-01-06 RX ADMIN — Medication 10 ML: at 13:52

## 2021-01-06 RX ADMIN — Medication 1 CAPSULE: at 08:25

## 2021-01-06 NOTE — PROGRESS NOTES
Hospitalist Progress Note    NAME: Peggy Curry   :  1960   MRN:  [id number]       Assessment / Plan:  Bilateral Covid-19 pneumonia, POA with  Acute hypoxic respiratory failure, POA 85% RA, requiring 3L O2  --CXR with bilateral mid and lower lung pna. Inflammatory markers elevated CRP 9.09, , fibrinogen 498, D-dimer 1.09  --start decadron 6mg IV daily x 10 days. Put on vitamin D, vitamin C, zinc for 7 days  --empiric abx with rocephin and levaquin. She finished Z-pack yesterday. --remdesivir discussed with patient and she is agreeable  --supplemental O2 3L with humidification, albuterol inhaler q6h    :  Continue with Decadron, Vit C, Zinc  Continue with empiric abx  Continue O2 supplementation - wean as tolerated      JULIEN on cpap at home  --ordered cpap with home pressure setting 7cm2 H20 qhs     Acute transaminitis, POA  --AST, ALT, and ALP acutely elevated, likely due to covid infection  --monitor cmp daily while on remdesivir     Mild acute kidney injury, Cr 1.2, baseline 0.76 in 2016  Acquired solitary kidney s/p right nephrectomy 2018 for renal cell CA  --gentle IVF x 1 day. Hold hctz     Diarrhea due to covid-19  --hold amitiza     Hx diverticulitis s/p sigmoid colectomy  --hold amitiza due to diarrhea      30.0 - 39.9 Obese / Body mass index is 36.4 kg/m². Code status: Full  Prophylaxis: Lovenox  Recommended Disposition: Home w/Family     Subjective:     Chief Complaint / Reason for Physician Visit  Feeling better but still SOB. Discussed with RN events overnight. Review of Systems:  Symptom Y/N Comments  Symptom Y/N Comments   Fever/Chills    Chest Pain n    Poor Appetite    Edema     Cough y   Abdominal Pain n    Sputum    Joint Pain     SOB/CHIU y   Pruritis/Rash     Nausea/vomit    Tolerating PT/OT     Diarrhea    Tolerating Diet     Constipation    Other       Could NOT obtain due to:      Objective:     VITALS:   Last 24hrs VS reviewed since prior progress note.  Most recent are:  Patient Vitals for the past 24 hrs:   Temp Pulse Resp BP SpO2   01/06/21 1138 98.4 °F (36.9 °C) 72 15 106/69 91 %   01/06/21 0753  66      01/06/21 0717 98 °F (36.7 °C) 66 25 100/76 93 %   01/06/21 0500 98 °F (36.7 °C) 66 19 118/81 92 %   01/06/21 0400  75      01/06/21 0300  75 19  96 %   01/06/21 0128 98.3 °F (36.8 °C) 73 20 106/69 93 %   01/06/21 0001  73 24 116/84 95 %   01/06/21 0000  75      01/05/21 2343 99 °F (37.2 °C) 72 23 109/69 93 %   01/05/21 2000  80 20 115/68 91 %   01/05/21 1930  85 24 114/65    01/05/21 1900  79 16 (!) 103/55 92 %   01/05/21 1830  86 25 130/78 94 %   01/05/21 1800  91 16 122/61 93 %   01/05/21 1730  82 24 110/63 95 %   01/05/21 1700  86 26 118/79 93 %   01/05/21 1630  86 25 112/75 93 %   01/05/21 1600  85 20 119/68 94 %   01/05/21 1530  87 21 118/79 96 %   01/05/21 1510  98 25 120/74 96 %   01/05/21 1500  89 21 (!) 81/66 97 %   01/05/21 1445  96 22 (!) 112/56 94 %   01/05/21 1430  91 22 103/68 96 %   01/05/21 1415  89 25 (!) 119/51 95 %   01/05/21 1400  89 27 118/67 94 %   01/05/21 1333  86 16 (!) 109/45 95 %       Intake/Output Summary (Last 24 hours) at 1/6/2021 1150  Last data filed at 1/6/2021 0830  Gross per 24 hour   Intake 2105 ml   Output 600 ml   Net 1505 ml        I had a face to face encounter and independently examined this patient on 1/6/2021, as outlined below:  PHYSICAL EXAM:  General: Alert, cooperative, no acute distress    EENT:  EOMI. Anicteric sclerae. MMM  Resp:  Coarse BS, on 2L NC  CV:  Regular  rhythm,  No edema  GI:  Soft, Non distended, Non tender. +Bowel sounds  Neurologic:  Alert and oriented X 3, normal speech,   Psych:   Good insight. Not anxious nor agitated  Skin:  No rashes.   No jaundice    Reviewed most current lab test results and cultures  YES  Reviewed most current radiology test results   YES  Review and summation of old records today    NO  Reviewed patient's current orders and MAR    YES  PMH/SH reviewed - no change compared to H&P  ________________________________________________________________________  Care Plan discussed with:    Comments   Patient x    Family      RN x    Care Manager     Consultant                        Multidiciplinary team rounds were held today with , nursing, pharmacist and clinical coordinator. Patient's plan of care was discussed; medications were reviewed and discharge planning was addressed. ________________________________________________________________________  Total NON critical care TIME:  35   Minutes    Total CRITICAL CARE TIME Spent:   Minutes non procedure based      Comments   >50% of visit spent in counseling and coordination of care     ________________________________________________________________________  Zaria De La Vega MD     Procedures: see electronic medical records for all procedures/Xrays and details which were not copied into this note but were reviewed prior to creation of Plan. LABS:  I reviewed today's most current labs and imaging studies.   Pertinent labs include:  Recent Labs     01/06/21  0506 01/05/21  0944 01/03/21  1415   WBC 5.3 5.8 6.1   HGB 11.5 12.8 14.0   HCT 33.4* 36.7 39.7    208 193     Recent Labs     01/06/21  0506 01/05/21  1150 01/05/21  0944 01/03/21  1415     --  136 133*   K 3.5  --  3.5 3.4*     --  105 100   CO2 24  --  25 25   GLU 95  --  128* 107*   BUN 11  --  13 13   CREA 0.92  --  1.24* 1.14*   CA 7.7*  --  8.1* 8.1*   MG  --   --   --  2.2   ALB 2.7*  --  3.0* 3.4*   TBILI 0.9  --  0.9 1.6*   *  --  144* 45   INR 1.1 1.1  --   --        Signed: Zaria De La Vega MD

## 2021-01-06 NOTE — INTERDISCIPLINARY ROUNDS
Interdisciplinary team rounds were held 1/6/2021 with the following team members:Care Management, Nursing, Nutrition, Pharmacy, Physician and Clinical Coordinator. Plan of care discussed. See clinical pathway and/or care plan for interventions and desired outcomes. Patient continues on 3L NC. Patient uses chronic CPAP at HS. Transfer to telemetry.

## 2021-01-06 NOTE — PROGRESS NOTES
Reason for Admission:  Patient came to ed for complaint of cough, sob, chest pain, fever and chills. PMHX significant for htn, renal carcinoma, s/p right nephrectomy. RUR Score:   13%                    Plan for utilizing home health:  She has not used home health or rehab in the past.         PCP: First and Last name:  Brayan Doshi     Name of Practice:  [facility]. Are you a current patient: Yes/No:  Yes     Approximate date of last visit:  Telephone call on Thursday to the office. Can you participate in a virtual visit with your PCP:  Yes                      Current Advanced Directive/Advance Care Plan:  She does not have Advance Directive at this time. She would like to hold off on this at this time until she speaks with her family. Transition of Care Plan:  Patient lives in one story home with her . She was independent prior to coming to the hospital. She drives and she works. She does not use oxygen at home. She uses cpap at night. She denies using dme for assistance with ambulation. She plans to return home when medically stable and her  will be transporting her home when medically stable. Paula Maria  RN BSN CRM        [phone]

## 2021-01-06 NOTE — PROGRESS NOTES
Problem: Risk for Spread of Infection  Goal: Prevent transmission of infectious organism to others  Description: Prevent the transmission of infectious organisms to other patients, staff members, and visitors. Outcome: Progressing Towards Goal     Problem: Falls - Risk of  Goal: *Absence of Falls  Description: Document Darling Lawrence Fall Risk and appropriate interventions in the flowsheet.   Outcome: Progressing Towards Goal  Note: Fall Risk Interventions:  Mobility Interventions: Assess mobility with egress test, Bed/chair exit alarm         Medication Interventions: Bed/chair exit alarm, Patient to call before getting OOB, Teach patient to arise slowly, Evaluate medications/consider consulting pharmacy    Elimination Interventions: Bed/chair exit alarm, Call light in reach, Patient to call for help with toileting needs, Stay With Me (per policy)

## 2021-01-06 NOTE — ED NOTES
TRANSFER - OUT REPORT:    Verbal report given to VASQUEZ Payan (name) on Darrel Ramos  being transferred to PCU (unit) for routine progression of care       Report consisted of patients Situation, Background, Assessment and   Recommendations(SBAR). Information from the following report(s) SBAR, Kardex and ED Summary was reviewed with the receiving nurse. Lines:   Peripheral IV 01/05/21 Left Antecubital (Active)   Site Assessment Clean, dry, & intact 01/05/21 0956   Phlebitis Assessment 0 01/05/21 0956   Infiltration Assessment 0 01/05/21 0956   Dressing Status Clean, dry, & intact 01/05/21 0956   Dressing Type Transparent 01/05/21 0956   Hub Color/Line Status Pink 01/05/21 0956   Action Taken Blood drawn 01/05/21 0956       Peripheral IV 01/05/21 Right Antecubital (Active)        Opportunity for questions and clarification was provided.       Patient transported with:   O2 @ 2 liters

## 2021-01-06 NOTE — PROGRESS NOTES
0700: Bedside shift change report given to Navya Nielsen RN (oncoming nurse) by Gabino Trevizo RN (offgoing nurse). Report included the following information SBAR, Kardex, Intake/Output, MAR and Recent Results. 0700: Chet Dudleytom will be documenting on patient. 1030: Dr. Darlene Vale at bedside assessing patient. Will continue current treatment and place transfer orders to [address] 1900: I have reviewed and agree with Svitlana Valera RN documentation. 1900: End of Shift Note    Bedside shift change report given to oncoming nurse (oncoming nurse) by Barby Nicole (offgoing nurse). Report included the following information SBAR, Kardex, Intake/Output and Recent Results    Shift worked:  1260-9001     Shift summary and any significant changes:     Patient on 2-3L NC, transfer to [address] placed. Concerns for physician to address:  None     Zone phone for oncoming shift:   XXX       Activity:  Activity Level: Up with Assistance  Number times ambulated in hallways past shift: 0  Number of times OOB to chair past shift: 1    Cardiac:   Cardiac Monitoring: Yes      Cardiac Rhythm: Normal sinus rhythm    Access:   Current line(s): PIV     Genitourinary:   Urinary status: voiding    Respiratory:   O2 Device: Nasal cannula  Chronic home O2 use?: NO  Incentive spirometer at bedside: YES     GI:  Last Bowel Movement Date: 01/05/20  Current diet:  No diet orders on file  Passing flatus: YES  Tolerating current diet: YES  % Diet Eaten: 50 %    Pain Management:   Patient states pain is manageable on current regimen: YES    Skin:  Carroll Score: 20  Interventions: speciality bed, increase time out of bed and PT/OT consult    Patient Safety:  Fall Score:  Total Score: 3  Interventions: gripper socks and pt to call before getting OOB  High Fall Risk: Yes    Length of Stay:  Expected LOS: 5d 12h  Actual LOS: [facility]

## 2021-01-07 LAB
ALBUMIN SERPL-MCNC: 2.5 G/DL (ref 3.5–5)
ALBUMIN/GLOB SERPL: 0.7 {RATIO} (ref 1.1–2.2)
ALP SERPL-CCNC: 102 U/L (ref 45–117)
ALT SERPL-CCNC: 113 U/L (ref 12–78)
ANION GAP SERPL CALC-SCNC: 6 MMOL/L (ref 5–15)
APTT PPP: 24.5 SEC (ref 22.1–31)
AST SERPL-CCNC: 65 U/L (ref 15–37)
BILIRUB SERPL-MCNC: 0.4 MG/DL (ref 0.2–1)
BUN SERPL-MCNC: 17 MG/DL (ref 6–20)
BUN/CREAT SERPL: 17 (ref 12–20)
CALCIUM SERPL-MCNC: 8.1 MG/DL (ref 8.5–10.1)
CHLORIDE SERPL-SCNC: 110 MMOL/L (ref 97–108)
CO2 SERPL-SCNC: 24 MMOL/L (ref 21–32)
CREAT SERPL-MCNC: 1.01 MG/DL (ref 0.55–1.02)
D DIMER PPP FEU-MCNC: 0.91 MG/L FEU (ref 0–0.65)
ERYTHROCYTE [DISTWIDTH] IN BLOOD BY AUTOMATED COUNT: 13 % (ref 11.5–14.5)
FIBRINOGEN PPP-MCNC: 483 MG/DL (ref 200–475)
GLOBULIN SER CALC-MCNC: 3.5 G/DL (ref 2–4)
GLUCOSE BLD STRIP.AUTO-MCNC: 123 MG/DL (ref 65–100)
GLUCOSE BLD STRIP.AUTO-MCNC: 149 MG/DL (ref 65–100)
GLUCOSE BLD STRIP.AUTO-MCNC: 151 MG/DL (ref 65–100)
GLUCOSE BLD STRIP.AUTO-MCNC: 94 MG/DL (ref 65–100)
GLUCOSE SERPL-MCNC: 102 MG/DL (ref 65–100)
HCT VFR BLD AUTO: 35.6 % (ref 35–47)
HGB BLD-MCNC: 12.6 G/DL (ref 11.5–16)
INR PPP: 1.1 (ref 0.9–1.1)
MCH RBC QN AUTO: 30.4 PG (ref 26–34)
MCHC RBC AUTO-ENTMCNC: 35.4 G/DL (ref 30–36.5)
MCV RBC AUTO: 85.8 FL (ref 80–99)
NRBC # BLD: 0 K/UL (ref 0–0.01)
NRBC BLD-RTO: 0 PER 100 WBC
PLATELET # BLD AUTO: 279 K/UL (ref 150–400)
PMV BLD AUTO: 9.3 FL (ref 8.9–12.9)
POTASSIUM SERPL-SCNC: 4.1 MMOL/L (ref 3.5–5.1)
PROT SERPL-MCNC: 6 G/DL (ref 6.4–8.2)
PROTHROMBIN TIME: 11.8 SEC (ref 9–11.1)
RBC # BLD AUTO: 4.15 M/UL (ref 3.8–5.2)
SERVICE CMNT-IMP: ABNORMAL
SERVICE CMNT-IMP: NORMAL
SODIUM SERPL-SCNC: 140 MMOL/L (ref 136–145)
THERAPEUTIC RANGE,PTTT: NORMAL SECS (ref 58–77)
WBC # BLD AUTO: 7.1 K/UL (ref 3.6–11)

## 2021-01-07 PROCEDURE — 85610 PROTHROMBIN TIME: CPT

## 2021-01-07 PROCEDURE — 74011250636 HC RX REV CODE- 250/636: Performed by: HOSPITALIST

## 2021-01-07 PROCEDURE — 85730 THROMBOPLASTIN TIME PARTIAL: CPT

## 2021-01-07 PROCEDURE — 94640 AIRWAY INHALATION TREATMENT: CPT

## 2021-01-07 PROCEDURE — 74011250636 HC RX REV CODE- 250/636: Performed by: GENERAL ACUTE CARE HOSPITAL

## 2021-01-07 PROCEDURE — 36415 COLL VENOUS BLD VENIPUNCTURE: CPT

## 2021-01-07 PROCEDURE — 80053 COMPREHEN METABOLIC PANEL: CPT

## 2021-01-07 PROCEDURE — 74011000250 HC RX REV CODE- 250: Performed by: HOSPITALIST

## 2021-01-07 PROCEDURE — 65660000000 HC RM CCU STEPDOWN

## 2021-01-07 PROCEDURE — 85027 COMPLETE CBC AUTOMATED: CPT

## 2021-01-07 PROCEDURE — 74011000258 HC RX REV CODE- 258: Performed by: HOSPITALIST

## 2021-01-07 PROCEDURE — 74011250637 HC RX REV CODE- 250/637: Performed by: GENERAL ACUTE CARE HOSPITAL

## 2021-01-07 PROCEDURE — 74011250637 HC RX REV CODE- 250/637: Performed by: HOSPITALIST

## 2021-01-07 PROCEDURE — 85384 FIBRINOGEN ACTIVITY: CPT

## 2021-01-07 PROCEDURE — 82962 GLUCOSE BLOOD TEST: CPT

## 2021-01-07 PROCEDURE — 85379 FIBRIN DEGRADATION QUANT: CPT

## 2021-01-07 RX ORDER — DEXAMETHASONE 4 MG/1
6 TABLET ORAL DAILY
Status: DISCONTINUED | OUTPATIENT
Start: 2021-01-08 | End: 2021-01-10 | Stop reason: HOSPADM

## 2021-01-07 RX ORDER — LEVOFLOXACIN 750 MG/1
750 TABLET ORAL EVERY 24 HOURS
Status: DISCONTINUED | OUTPATIENT
Start: 2021-01-07 | End: 2021-01-10 | Stop reason: HOSPADM

## 2021-01-07 RX ORDER — ENOXAPARIN SODIUM 100 MG/ML
40 INJECTION SUBCUTANEOUS EVERY 12 HOURS
Status: DISCONTINUED | OUTPATIENT
Start: 2021-01-07 | End: 2021-01-10 | Stop reason: HOSPADM

## 2021-01-07 RX ORDER — ALBUTEROL SULFATE 90 UG/1
2 AEROSOL, METERED RESPIRATORY (INHALATION)
Status: DISCONTINUED | OUTPATIENT
Start: 2021-01-07 | End: 2021-01-10 | Stop reason: HOSPADM

## 2021-01-07 RX ADMIN — CEFTRIAXONE 1 G: 1 INJECTION, POWDER, FOR SOLUTION INTRAMUSCULAR; INTRAVENOUS at 17:57

## 2021-01-07 RX ADMIN — OXYCODONE HYDROCHLORIDE AND ACETAMINOPHEN 500 MG: 500 TABLET ORAL at 09:47

## 2021-01-07 RX ADMIN — Medication 10 ML: at 13:15

## 2021-01-07 RX ADMIN — ONDANSETRON 4 MG: 2 INJECTION INTRAMUSCULAR; INTRAVENOUS at 05:23

## 2021-01-07 RX ADMIN — ALBUTEROL SULFATE 2 PUFF: 90 AEROSOL, METERED RESPIRATORY (INHALATION) at 13:11

## 2021-01-07 RX ADMIN — ENOXAPARIN SODIUM 40 MG: 40 INJECTION SUBCUTANEOUS at 09:47

## 2021-01-07 RX ADMIN — ENOXAPARIN SODIUM 40 MG: 40 INJECTION SUBCUTANEOUS at 21:17

## 2021-01-07 RX ADMIN — ZINC SULFATE 220 MG (50 MG) CAPSULE 1 CAPSULE: CAPSULE at 18:02

## 2021-01-07 RX ADMIN — ALBUTEROL SULFATE 2 PUFF: 90 AEROSOL, METERED RESPIRATORY (INHALATION) at 19:58

## 2021-01-07 RX ADMIN — MORPHINE SULFATE 1 MG: 2 INJECTION, SOLUTION INTRAMUSCULAR; INTRAVENOUS at 22:56

## 2021-01-07 RX ADMIN — MORPHINE SULFATE 1 MG: 2 INJECTION, SOLUTION INTRAMUSCULAR; INTRAVENOUS at 05:23

## 2021-01-07 RX ADMIN — DEXAMETHASONE SODIUM PHOSPHATE 6 MG: 4 INJECTION, SOLUTION INTRAMUSCULAR; INTRAVENOUS at 09:47

## 2021-01-07 RX ADMIN — Medication 10 ML: at 21:17

## 2021-01-07 RX ADMIN — ZINC SULFATE 220 MG (50 MG) CAPSULE 1 CAPSULE: CAPSULE at 09:48

## 2021-01-07 RX ADMIN — ONDANSETRON 4 MG: 2 INJECTION INTRAMUSCULAR; INTRAVENOUS at 22:56

## 2021-01-07 RX ADMIN — FAMOTIDINE 40 MG: 20 TABLET, FILM COATED ORAL at 09:47

## 2021-01-07 RX ADMIN — Medication 10 ML: at 05:24

## 2021-01-07 RX ADMIN — MELATONIN 3 MG: at 21:17

## 2021-01-07 RX ADMIN — REMDESIVIR 100 MG: 100 INJECTION, POWDER, LYOPHILIZED, FOR SOLUTION INTRAVENOUS at 22:05

## 2021-01-07 RX ADMIN — OXYCODONE HYDROCHLORIDE AND ACETAMINOPHEN 500 MG: 500 TABLET ORAL at 18:02

## 2021-01-07 RX ADMIN — BUPROPION HYDROCHLORIDE 150 MG: 150 TABLET, EXTENDED RELEASE ORAL at 18:06

## 2021-01-07 RX ADMIN — CHOLECALCIFEROL TAB 25 MCG (1000 UNIT) 2 TABLET: 25 TAB at 09:47

## 2021-01-07 RX ADMIN — Medication 1 CAPSULE: at 09:47

## 2021-01-07 RX ADMIN — POLYETHYLENE GLYCOL 3350 17 G: 17 POWDER, FOR SOLUTION ORAL at 18:01

## 2021-01-07 RX ADMIN — LEVOFLOXACIN 750 MG: 750 TABLET, FILM COATED ORAL at 18:02

## 2021-01-07 NOTE — ROUTINE PROCESS
Bedside and Verbal shift change report given to Sneha (oncoming nurse) by Shelton Bueno (offgoing nurse). Report included the following information SBAR, Kardex, ED Summary, Intake/Output, MAR, Recent Results, Med Rec Status, Cardiac Rhythm Sinus R, Alarm Parameters , Quality Measures and Dual Neuro Assessment. Shift assessment and VS completed. Pt transferred from room 2246 (no neg pressure ventilation) to 2250. 
 
2204: 
Pt placed on CPAP by RRT. 2237: 
Pt states CPAP suffocates her and make her have panic attack. D/C CPAP and notified NPCaden. \"Pt used CPAP at home and started on CPAP on the floor less than an hour ago. Pt complained CPAP suffocates and makes her to have panic attack. When asked about the type of CPAP used at home, Pt states the mask is different and makes her uncomfortable. D/C CPAP\" 
 
9173: 
Admission database completed. End of Shift Note Bedside shift change report given to [facility] (oncoming nurse) by Isaías Graves (offgoing nurse). Report included the following information SBAR, Kardex, ED Summary, Intake/Output, MAR, Recent Results, Med Rec Status, Cardiac Rhythm NSR, Alarm Parameters , Quality Measures and Dual Neuro Assessment Shift worked:  7pm to 730 am 
  
Shift summary and any significant changes:  
  see progress note Concerns for physician to address:  None Zone phone for oncoming shift:    
  
 
Activity: 
Activity Level: Up with Assistance Number times ambulated in hallways past shift: 0 Number of times OOB to chair past shift: 2 Cardiac:  
Cardiac Monitoring: Yes     
Cardiac Rhythm: Normal sinus rhythm Access:  
Current line(s): PIV Genitourinary:  
Urinary status: voiding Respiratory:  
O2 Device: Nasal cannula Chronic home O2 use?: YES Incentive spirometer at bedside: YES 
  
GI: 
Last Bowel Movement Date: 01/05/21 Current diet:  No diet orders on file Passing flatus: NO Tolerating current diet: YES 
% Diet Eaten: 50 % Pain Management:  
Patient states pain is manageable on current regimen: YES Skin: 
Carroll Score: 19 Interventions: increase time out of bed and limit briefs Patient Safety: 
Fall Score: Total Score: 2 Interventions: bed/chair alarm, gripper socks and pt to call before getting OOB High Fall Risk: Yes Length of Stay: 
Expected LOS: 5d 12h [facility]

## 2021-01-07 NOTE — PROGRESS NOTES
End of Shift Note    Bedside shift change report given to AL (oncoming nurse) by Brian Vieira (offgoing nurse). Report included the following information SBAR, Kardex, ED Summary, Intake/Output and MAR    Shift worked:  7-7     Shift summary and any significant changes: In chair fo all meals tommorrow     Concerns for physician to address: na     Zone phone for oncoming shift:   [phone]       Activity:  Activity Level: Up with Assistance  Number times ambulated in hallways past shift: 0  Number of times OOB to chair past shift: 0    Cardiac:   Cardiac Monitoring: Yes      Cardiac Rhythm: Normal sinus rhythm    Access:   Current line(s): PIV     Genitourinary:   Urinary status: voiding    Respiratory:   O2 Device: Nasal cannula  Chronic home O2 use?: NO  Incentive spirometer at bedside: YES  Actual Volume (ml): 1500 ml  GI:  Last Bowel Movement Date: 01/05/21  Current diet:  No diet orders on file  Passing flatus: YES  Tolerating current diet: YES  % Diet Eaten: 70 %    Pain Management:   Patient states pain is manageable on current regimen: YES    Skin:  Carroll Score: 19  Interventions: increase time out of bed    Patient Safety:  Fall Score:  Total Score: 2  Interventions: bed/chair alarm and pt to call before getting OOB  High Fall Risk: Yes    Length of Stay:  Expected LOS: 5d 12h  Actual LOS: [address] [facility]

## 2021-01-07 NOTE — PROGRESS NOTES
Hospitalist Progress Note    NAME: Joe Schmidt   :  1960   MRN:  [id number]       Assessment / Plan:  Bilateral Covid-19 pneumonia, POA with  Acute hypoxic respiratory failure, POA 85% RA, requiring 3L O2  --CXR with bilateral mid and lower lung pna. Inflammatory markers elevated CRP 9.09, , fibrinogen 498, D-dimer 1.09  --start decadron 6mg IV daily x 10 days. Put on vitamin D, vitamin C, zinc for 7 days  --empiric abx with rocephin and levaquin. She finished Z-pack yesterday. --remdesivir discussed with patient and she is agreeable  --supplemental O2 3L with humidification, albuterol inhaler q6h    :  Continue with Decadron, Vit C, Zinc  Continue with empiric abx  Continue O2 supplementation - wean as tolerated     :  Continue with Decadron, Vit C, Zinc and Remdesivir  Continue O2 supplementation - wean as tolerated  Continue with empiric abx     JULIEN on cpap at home  --ordered cpap with home pressure setting 7cm2 H20 qhs     Acute transaminitis, POA  --AST, ALT, and ALP acutely elevated, likely due to covid infection  --monitor cmp daily while on remdesivir     Mild acute kidney injury, Cr 1.2, baseline 0.76 in 2016  Acquired solitary kidney s/p right nephrectomy 2018 for renal cell CA  --gentle IVF x 1 day. Hold hctz     Diarrhea due to covid-19  --hold amitiza     Hx diverticulitis s/p sigmoid colectomy  --hold amitiza due to diarrhea      30.0 - 39.9 Obese / Body mass index is 36.4 kg/m². Code status: Full  Prophylaxis: Lovenox  Recommended Disposition: Home w/Family     Subjective:     Chief Complaint / Reason for Physician Visit  Feeling better but still SOB. Discussed with RN events overnight.      Review of Systems:  Symptom Y/N Comments  Symptom Y/N Comments   Fever/Chills    Chest Pain n    Poor Appetite    Edema     Cough y   Abdominal Pain n    Sputum    Joint Pain     SOB/CHIU y   Pruritis/Rash     Nausea/vomit    Tolerating PT/OT     Diarrhea    Tolerating Diet Constipation    Other       Could NOT obtain due to:      Objective:     VITALS:   Last 24hrs VS reviewed since prior progress note. Most recent are:  Patient Vitals for the past 24 hrs:   Temp Pulse Resp BP SpO2   01/07/21 1311     96 %   01/07/21 1200  68      01/07/21 1147 99.4 °F (37.4 °C) 70 22 122/82 92 %   01/07/21 0752 98.2 °F (36.8 °C) 71 21 104/60 91 %   01/07/21 0400  68      01/07/21 0256 98 °F (36.7 °C) 69 18 110/77 97 %   01/07/21 0000  67      01/06/21 2246 98.8 °F (37.1 °C) 68 16 103/62 94 %   01/06/21 2209     95 %   01/06/21 2001 98.7 °F (37.1 °C) 74 23 122/80 92 %   01/06/21 1927     92 %   01/06/21 1600  72      01/06/21 1505 98.2 °F (36.8 °C)       01/06/21 1504 98.2 °F (36.8 °C) 69 23 117/79 91 %       Intake/Output Summary (Last 24 hours) at 1/7/2021 1403  Last data filed at 1/7/2021 7760  Gross per 24 hour   Intake 800 ml   Output 540 ml   Net 260 ml        I had a face to face encounter and independently examined this patient on 1/7/2021, as outlined below:  PHYSICAL EXAM:  General: Alert, cooperative, no acute distress    EENT:  EOMI. Anicteric sclerae. MMM  Resp:  Coarse BS, on 2L NC  CV:  Regular  rhythm,  No edema  GI:  Soft, Non distended, Non tender. +Bowel sounds  Neurologic:  Alert and oriented X 3, normal speech,   Psych:   Good insight. Not anxious nor agitated  Skin:  No rashes. No jaundice    Reviewed most current lab test results and cultures  YES  Reviewed most current radiology test results   YES  Review and summation of old records today    NO  Reviewed patient's current orders and MAR    YES  PMH/SH reviewed - no change compared to H&P  ________________________________________________________________________  Care Plan discussed with:    Comments   Patient x    Family      RN x    Care Manager     Consultant                        Multidiciplinary team rounds were held today with , nursing, pharmacist and clinical coordinator. Patient's plan of care was discussed; medications were reviewed and discharge planning was addressed. ________________________________________________________________________  Total NON critical care TIME:  35   Minutes    Total CRITICAL CARE TIME Spent:   Minutes non procedure based      Comments   >50% of visit spent in counseling and coordination of care     ________________________________________________________________________  Sohail Kingsley MD     Procedures: see electronic medical records for all procedures/Xrays and details which were not copied into this note but were reviewed prior to creation of Plan. LABS:  I reviewed today's most current labs and imaging studies.   Pertinent labs include:  Recent Labs     01/07/21  0305 01/06/21  0506 01/05/21  0944   WBC 7.1 5.3 5.8   HGB 12.6 11.5 12.8   HCT 35.6 33.4* 36.7    226 208     Recent Labs     01/07/21  0305 01/06/21  0506 01/05/21  1150 01/05/21  0944    136  --  136   K 4.1 3.5  --  3.5   * 105  --  105   CO2 24 24  --  25   * 95  --  128*   BUN 17 11  --  13   CREA 1.01 0.92  --  1.24*   CA 8.1* 7.7*  --  8.1*   ALB 2.5* 2.7*  --  3.0*   TBILI 0.4 0.9  --  0.9   * 122*  --  144*   INR 1.1 1.1 1.1  --        Signed: Sohail Kingsley MD

## 2021-01-08 LAB
ALBUMIN SERPL-MCNC: 2.6 G/DL (ref 3.5–5)
ALBUMIN/GLOB SERPL: 0.8 {RATIO} (ref 1.1–2.2)
ALP SERPL-CCNC: 97 U/L (ref 45–117)
ALT SERPL-CCNC: 88 U/L (ref 12–78)
ANION GAP SERPL CALC-SCNC: 7 MMOL/L (ref 5–15)
APTT PPP: 26.5 SEC (ref 22.1–31)
AST SERPL-CCNC: 35 U/L (ref 15–37)
BILIRUB SERPL-MCNC: 0.4 MG/DL (ref 0.2–1)
BUN SERPL-MCNC: 19 MG/DL (ref 6–20)
BUN/CREAT SERPL: 20 (ref 12–20)
CALCIUM SERPL-MCNC: 8.2 MG/DL (ref 8.5–10.1)
CHLORIDE SERPL-SCNC: 108 MMOL/L (ref 97–108)
CO2 SERPL-SCNC: 24 MMOL/L (ref 21–32)
CREAT SERPL-MCNC: 0.95 MG/DL (ref 0.55–1.02)
D DIMER PPP FEU-MCNC: 0.81 MG/L FEU (ref 0–0.65)
ERYTHROCYTE [DISTWIDTH] IN BLOOD BY AUTOMATED COUNT: 12.8 % (ref 11.5–14.5)
FIBRINOGEN PPP-MCNC: 439 MG/DL (ref 200–475)
GLOBULIN SER CALC-MCNC: 3.4 G/DL (ref 2–4)
GLUCOSE BLD STRIP.AUTO-MCNC: 91 MG/DL (ref 65–100)
GLUCOSE SERPL-MCNC: 93 MG/DL (ref 65–100)
HCT VFR BLD AUTO: 34.7 % (ref 35–47)
HGB BLD-MCNC: 12.2 G/DL (ref 11.5–16)
INR PPP: 1.2 (ref 0.9–1.1)
MCH RBC QN AUTO: 30.1 PG (ref 26–34)
MCHC RBC AUTO-ENTMCNC: 35.2 G/DL (ref 30–36.5)
MCV RBC AUTO: 85.7 FL (ref 80–99)
NRBC # BLD: 0 K/UL (ref 0–0.01)
NRBC BLD-RTO: 0 PER 100 WBC
PLATELET # BLD AUTO: 336 K/UL (ref 150–400)
PMV BLD AUTO: 9.4 FL (ref 8.9–12.9)
POTASSIUM SERPL-SCNC: 4.2 MMOL/L (ref 3.5–5.1)
PROT SERPL-MCNC: 6 G/DL (ref 6.4–8.2)
PROTHROMBIN TIME: 12.4 SEC (ref 9–11.1)
RBC # BLD AUTO: 4.05 M/UL (ref 3.8–5.2)
SERVICE CMNT-IMP: NORMAL
SODIUM SERPL-SCNC: 139 MMOL/L (ref 136–145)
THERAPEUTIC RANGE,PTTT: NORMAL SECS (ref 58–77)
WBC # BLD AUTO: 8.7 K/UL (ref 3.6–11)

## 2021-01-08 PROCEDURE — 77010033678 HC OXYGEN DAILY

## 2021-01-08 PROCEDURE — 85384 FIBRINOGEN ACTIVITY: CPT

## 2021-01-08 PROCEDURE — 74011250636 HC RX REV CODE- 250/636: Performed by: GENERAL ACUTE CARE HOSPITAL

## 2021-01-08 PROCEDURE — 74011000250 HC RX REV CODE- 250: Performed by: HOSPITALIST

## 2021-01-08 PROCEDURE — 85610 PROTHROMBIN TIME: CPT

## 2021-01-08 PROCEDURE — 74011250637 HC RX REV CODE- 250/637: Performed by: HOSPITALIST

## 2021-01-08 PROCEDURE — 80053 COMPREHEN METABOLIC PANEL: CPT

## 2021-01-08 PROCEDURE — 85027 COMPLETE CBC AUTOMATED: CPT

## 2021-01-08 PROCEDURE — 74011250637 HC RX REV CODE- 250/637: Performed by: GENERAL ACUTE CARE HOSPITAL

## 2021-01-08 PROCEDURE — 65660000000 HC RM CCU STEPDOWN

## 2021-01-08 PROCEDURE — 85379 FIBRIN DEGRADATION QUANT: CPT

## 2021-01-08 PROCEDURE — 85730 THROMBOPLASTIN TIME PARTIAL: CPT

## 2021-01-08 PROCEDURE — 74011250636 HC RX REV CODE- 250/636: Performed by: HOSPITALIST

## 2021-01-08 PROCEDURE — 77030040361 HC SLV COMPR DVT MDII -B

## 2021-01-08 PROCEDURE — 94640 AIRWAY INHALATION TREATMENT: CPT

## 2021-01-08 PROCEDURE — 82962 GLUCOSE BLOOD TEST: CPT

## 2021-01-08 PROCEDURE — 36415 COLL VENOUS BLD VENIPUNCTURE: CPT

## 2021-01-08 PROCEDURE — 74011000258 HC RX REV CODE- 258: Performed by: HOSPITALIST

## 2021-01-08 RX ADMIN — Medication 10 ML: at 05:59

## 2021-01-08 RX ADMIN — ALBUTEROL SULFATE 2 PUFF: 90 AEROSOL, METERED RESPIRATORY (INHALATION) at 09:08

## 2021-01-08 RX ADMIN — CEFTRIAXONE 1 G: 1 INJECTION, POWDER, FOR SOLUTION INTRAMUSCULAR; INTRAVENOUS at 17:23

## 2021-01-08 RX ADMIN — DEXAMETHASONE 6 MG: 4 TABLET ORAL at 09:47

## 2021-01-08 RX ADMIN — ENOXAPARIN SODIUM 40 MG: 40 INJECTION SUBCUTANEOUS at 09:46

## 2021-01-08 RX ADMIN — FAMOTIDINE 40 MG: 20 TABLET, FILM COATED ORAL at 09:47

## 2021-01-08 RX ADMIN — Medication 10 ML: at 14:00

## 2021-01-08 RX ADMIN — CHOLECALCIFEROL TAB 25 MCG (1000 UNIT) 2 TABLET: 25 TAB at 09:47

## 2021-01-08 RX ADMIN — Medication 1 CAPSULE: at 09:47

## 2021-01-08 RX ADMIN — BUPROPION HYDROCHLORIDE 150 MG: 150 TABLET, EXTENDED RELEASE ORAL at 17:23

## 2021-01-08 RX ADMIN — ALBUTEROL SULFATE 2 PUFF: 90 AEROSOL, METERED RESPIRATORY (INHALATION) at 15:08

## 2021-01-08 RX ADMIN — ZINC SULFATE 220 MG (50 MG) CAPSULE 1 CAPSULE: CAPSULE at 17:23

## 2021-01-08 RX ADMIN — Medication 10 ML: at 21:28

## 2021-01-08 RX ADMIN — LEVOFLOXACIN 750 MG: 750 TABLET, FILM COATED ORAL at 17:23

## 2021-01-08 RX ADMIN — MELATONIN 3 MG: at 21:27

## 2021-01-08 RX ADMIN — ENOXAPARIN SODIUM 40 MG: 40 INJECTION SUBCUTANEOUS at 21:27

## 2021-01-08 RX ADMIN — REMDESIVIR 100 MG: 100 INJECTION, POWDER, LYOPHILIZED, FOR SOLUTION INTRAVENOUS at 21:28

## 2021-01-08 RX ADMIN — OXYCODONE HYDROCHLORIDE AND ACETAMINOPHEN 500 MG: 500 TABLET ORAL at 09:48

## 2021-01-08 RX ADMIN — ZINC SULFATE 220 MG (50 MG) CAPSULE 1 CAPSULE: CAPSULE at 09:47

## 2021-01-08 RX ADMIN — ALBUTEROL SULFATE 2 PUFF: 90 AEROSOL, METERED RESPIRATORY (INHALATION) at 21:26

## 2021-01-08 RX ADMIN — OXYCODONE HYDROCHLORIDE AND ACETAMINOPHEN 500 MG: 500 TABLET ORAL at 17:23

## 2021-01-08 NOTE — PROGRESS NOTES
TRANSFER - IN REPORT:    Verbal report received from Teodora(name) on [employer]  being received from PCU(unit) for routine progression of care      Report consisted of patients Situation, Background, Assessment and   Recommendations(SBAR). Information from the following report(s) SBAR, Kardex, Intake/Output, MAR, Accordion, Recent Results and Cardiac Rhythm NSR was reviewed with the receiving nurse. Opportunity for questions and clarification was provided. Assessment completed upon patients arrival to unit and care assumed.

## 2021-01-08 NOTE — ROUTINE PROCESS
Bedside and Verbal shift change report given to Sneha (oncoming nurse) by Hyun Briseno (offgoing nurse). Report included the following information SBAR, Kardex, Intake/Output, MAR, Recent Results, Med Rec Status, Alarm Parameters  and Quality Measures. Shift assessment and VS completed. Pt on 3L nc, resting in bed and watching tv. No sign of distress and continue to monitor. End of Shift Note Bedside shift change report given to Hyun Briseno (oncoming nurse) by Isabel Briggs (offgoing nurse). Report included the following information SBAR, Kardex, Intake/Output, MAR, Recent Results, Med Rec Status, Cardiac Rhythm NSR/Sinus Art (sleep), Alarm Parameters  and Quality Measures Shift worked:  7 pm to 730 am 
  
Shift summary and any significant changes:  
  See progress notes Concerns for physician to address:  None Zone phone for oncoming shift:    
  
 
Activity: 
Activity Level: Up with Assistance Number times ambulated in hallways past shift: 0 Number of times OOB to chair past shift: 3 Cardiac:  
Cardiac Monitoring: Yes     
Cardiac Rhythm: Normal sinus rhythm Access:  
Current line(s): PIV Genitourinary:  
Urinary status: voiding Respiratory:  
O2 Device: Hi flow nasal cannula Chronic home O2 use?: NO Incentive spirometer at bedside: YES Actual Volume (ml): 1250 ml GI: 
Last Bowel Movement Date: 01/08/21 Current diet:  No diet orders on file Passing flatus: YES Tolerating current diet: YES 
% Diet Eaten: 70 % Pain Management:  
Patient states pain is manageable on current regimen: YES Skin: 
Carroll Score: 19 Interventions: increase time out of bed, PT/OT consult and limit briefs Patient Safety: 
Fall Score: Total Score: 2 Interventions: bed/chair alarm, gripper socks and pt to call before getting OOB High Fall Risk: Yes Length of Stay: 
Expected LOS: 5d 12h Actual LOS: 3 Isabel Briggs

## 2021-01-08 NOTE — PROGRESS NOTES
Hospitalist Progress Note    NAME: Eliana Short   :  1960   MRN:  [id number]       Assessment / Plan:  Bilateral Covid-19 pneumonia, POA with  Acute hypoxic respiratory failure, POA 85% RA, requiring 3L O2  --CXR with bilateral mid and lower lung pna.  Inflammatory markers elevated CRP 9.09, , fibrinogen 498, D-dimer 1.09  --start decadron 6mg IV daily x 10 days.  Put on vitamin D, vitamin C, zinc for 7 days  --empiric abx with rocephin and levaquin.  She finished Z-pack yesterday.  --remdesivir discussed with patient and she is agreeable  --supplemental O2 3L with humidification, albuterol inhaler q6h    :  Continue with Decadron, Vit C, Zinc  Continue with empiric abx  Continue O2 supplementation - wean as tolerated     :  Continue with Decadron, Vit C, Zinc and Remdesivir  Continue O2 supplementation - wean as tolerated  Continue with empiric abx    :  Continue with Decadron, Vit C, Zinc and Remdesivir  Continue O2 supplementation - wean as tolerated  Continue with empiric abx     JULIEN on cpap at home  --ordered cpap with home pressure setting 7cm2 H20 qhs     Acute transaminitis, POA  --AST, ALT, and ALP acutely elevated, likely due to covid infection  --monitor cmp daily while on remdesivir     Mild acute kidney injury, Cr 1.2, baseline 0.76 in 2016  Acquired solitary kidney s/p right nephrectomy 2018 for renal cell CA  --gentle IVF x 1 day.  Hold hctz     Diarrhea due to covid-19  --hold amitiza     Hx diverticulitis s/p sigmoid colectomy  --hold amitiza due to diarrhea      30.0 - 39.9 Obese / Body mass index is 36.4 kg/m².    Code status: Full  Prophylaxis: Lovenox  Recommended Disposition: Home w/Family     Subjective:     Chief Complaint / Reason for Physician Visit  Feeling better but still SOB.  Discussed with RN events overnight.     Review of Systems:  Symptom Y/N Comments  Symptom Y/N Comments   Fever/Chills    Chest Pain n    Poor Appetite    Edema     Cough y   Abdominal  Pain n    Sputum    Joint Pain     SOB/CHIU y   Pruritis/Rash     Nausea/vomit    Tolerating PT/OT     Diarrhea    Tolerating Diet     Constipation    Other       Could NOT obtain due to:      Objective:     VITALS:   Last 24hrs VS reviewed since prior progress note. Most recent are:  Patient Vitals for the past 24 hrs:   Temp Pulse Resp BP SpO2   01/08/21 1508     92 %   01/08/21 1455 98.2 °F (36.8 °C) 65 19 (!) 143/87 91 %   01/08/21 1108 98.1 °F (36.7 °C) 69 22 108/77 93 %   01/08/21 0908     95 %   01/08/21 0741 97.5 °F (36.4 °C) 63 16 126/80 96 %   01/08/21 0308 98.4 °F (36.9 °C) 62 22 121/71 97 %   01/07/21 2301 98.9 °F (37.2 °C) 63 23 130/78 94 %   01/07/21 1959     96 %   01/07/21 1919 98.3 °F (36.8 °C) 67 22 133/81 94 %   01/07/21 1808 98.6 °F (37 °C) 68 25 112/72 94 %       Intake/Output Summary (Last 24 hours) at 1/8/2021 1703  Last data filed at 1/8/2021 1108  Gross per 24 hour   Intake 1300 ml   Output 200 ml   Net 1100 ml        I had a face to face encounter and independently examined this patient on 1/8/2021, as outlined below:  PHYSICAL EXAM:  General: Alert, cooperative, no acute distress    EENT:  EOMI. Anicteric sclerae. MMM  Resp:  Coarse BS, on 2L NC  CV:  Regular  rhythm,  No edema  GI:  Soft, Non distended, Non tender. +Bowel sounds  Neurologic:  Alert and oriented X 3, normal speech,   Psych:   Good insight. Not anxious nor agitated  Skin:  No rashes.   No jaundice    Reviewed most current lab test results and cultures  YES  Reviewed most current radiology test results   YES  Review and summation of old records today    NO  Reviewed patient's current orders and MAR    YES  PMH/SH reviewed - no change compared to H&P  ________________________________________________________________________  Care Plan discussed with:    Comments   Patient x    Family      RN x    Care Manager     Consultant                        Multidiciplinary team rounds were held today with , nursing, pharmacist and clinical coordinator. Patient's plan of care was discussed; medications were reviewed and discharge planning was addressed. ________________________________________________________________________  Total NON critical care TIME:  35   Minutes    Total CRITICAL CARE TIME Spent:   Minutes non procedure based      Comments   >50% of visit spent in counseling and coordination of care     ________________________________________________________________________  Rei Cobb MD     Procedures: see electronic medical records for all procedures/Xrays and details which were not copied into this note but were reviewed prior to creation of Plan. LABS:  I reviewed today's most current labs and imaging studies.   Pertinent labs include:  Recent Labs     01/08/21  0311 01/07/21  0305 01/06/21  0506   WBC 8.7 7.1 5.3   HGB 12.2 12.6 11.5   HCT 34.7* 35.6 33.4*    279 226     Recent Labs     01/08/21  0311 01/07/21  0305 01/06/21  0506    140 136   K 4.2 4.1 3.5    110* 105   CO2 24 24 24   GLU 93 102* 95   BUN 19 17 11   CREA 0.95 1.01 0.92   CA 8.2* 8.1* 7.7*   ALB 2.6* 2.5* 2.7*   TBILI 0.4 0.4 0.9   ALT 88* 113* 122*   INR 1.2* 1.1 1.1       Signed: Rei Cobb MD

## 2021-01-08 NOTE — PROGRESS NOTES
End of Shift Note    Bedside shift change report given to Jasen Johnston (oncoming nurse) by Grace Raza (offgoing nurse).   Report included the following information SBAR, Kardex, Intake/Output, MAR, Accordion and Recent Results    Shift worked:  7-7p     Shift summary and any significant changes:     Ambulate to the bathroom x1 assist     Concerns for physician to address:  None     Zone phone for oncoming shift:   [phone]

## 2021-01-08 NOTE — PROGRESS NOTES
TRANSFER - OUT REPORT:    Verbal report given to RN(name) on Ramiro Frye  being transferred to Renal(unit) for routine progression of care       Report consisted of patients Situation, Background, Assessment and   Recommendations(SBAR). Information from the following report(s) SBAR, Kardex, ED Summary, Intake/Output, MAR and Cardiac Rhythm NSR was reviewed with the receiving nurse. Lines:   Peripheral IV 01/05/21 Left Antecubital (Active)   Site Assessment Clean, dry, & intact 01/08/21 1108   Phlebitis Assessment 0 01/08/21 1108   Infiltration Assessment 0 01/08/21 1108   Dressing Status Clean, dry, & intact 01/08/21 1108   Dressing Type Transparent 01/08/21 1108   Hub Color/Line Status Pink 01/08/21 1108   Action Taken Blood drawn 01/05/21 0956   Alcohol Cap Used Yes 01/08/21 0308       Peripheral IV 01/05/21 Right Antecubital (Active)   Site Assessment Clean, dry, & intact 01/08/21 1108   Phlebitis Assessment 0 01/08/21 1108   Infiltration Assessment 0 01/08/21 1108   Dressing Status Clean, dry, & intact 01/08/21 1108   Dressing Type Transparent 01/08/21 1108   Hub Color/Line Status Pink 01/08/21 1108   Action Taken Open ports on tubing capped 01/06/21 0137   Alcohol Cap Used Yes 01/08/21 0308        Opportunity for questions and clarification was provided.       Patient transported with:   O2 @ 2 liters  Tech

## 2021-01-09 LAB
ALBUMIN SERPL-MCNC: 2.7 G/DL (ref 3.5–5)
ALBUMIN/GLOB SERPL: 0.8 {RATIO} (ref 1.1–2.2)
ALP SERPL-CCNC: 94 U/L (ref 45–117)
ALT SERPL-CCNC: 77 U/L (ref 12–78)
ANION GAP SERPL CALC-SCNC: 8 MMOL/L (ref 5–15)
APTT PPP: 27.3 SEC (ref 22.1–31)
AST SERPL-CCNC: 28 U/L (ref 15–37)
BILIRUB SERPL-MCNC: 0.4 MG/DL (ref 0.2–1)
BUN SERPL-MCNC: 20 MG/DL (ref 6–20)
BUN/CREAT SERPL: 22 (ref 12–20)
CALCIUM SERPL-MCNC: 8.3 MG/DL (ref 8.5–10.1)
CHLORIDE SERPL-SCNC: 106 MMOL/L (ref 97–108)
CO2 SERPL-SCNC: 24 MMOL/L (ref 21–32)
COMMENT, HOLDF: NORMAL
CREAT SERPL-MCNC: 0.9 MG/DL (ref 0.55–1.02)
D DIMER PPP FEU-MCNC: 0.85 MG/L FEU (ref 0–0.65)
ERYTHROCYTE [DISTWIDTH] IN BLOOD BY AUTOMATED COUNT: 12.7 % (ref 11.5–14.5)
FIBRINOGEN PPP-MCNC: 412 MG/DL (ref 200–475)
GLOBULIN SER CALC-MCNC: 3.4 G/DL (ref 2–4)
GLUCOSE SERPL-MCNC: 110 MG/DL (ref 65–100)
HCT VFR BLD AUTO: 35.5 % (ref 35–47)
HGB BLD-MCNC: 12.2 G/DL (ref 11.5–16)
INR PPP: 1.3 (ref 0.9–1.1)
MCH RBC QN AUTO: 29.8 PG (ref 26–34)
MCHC RBC AUTO-ENTMCNC: 34.4 G/DL (ref 30–36.5)
MCV RBC AUTO: 86.6 FL (ref 80–99)
NRBC # BLD: 0.02 K/UL (ref 0–0.01)
NRBC BLD-RTO: 0.2 PER 100 WBC
PLATELET # BLD AUTO: 376 K/UL (ref 150–400)
PMV BLD AUTO: 9.1 FL (ref 8.9–12.9)
POTASSIUM SERPL-SCNC: 4.2 MMOL/L (ref 3.5–5.1)
PROT SERPL-MCNC: 6.1 G/DL (ref 6.4–8.2)
PROTHROMBIN TIME: 13 SEC (ref 9–11.1)
RBC # BLD AUTO: 4.1 M/UL (ref 3.8–5.2)
SAMPLES BEING HELD,HOLD: NORMAL
SODIUM SERPL-SCNC: 138 MMOL/L (ref 136–145)
THERAPEUTIC RANGE,PTTT: NORMAL SECS (ref 58–77)
WBC # BLD AUTO: 10.3 K/UL (ref 3.6–11)

## 2021-01-09 PROCEDURE — 74011000250 HC RX REV CODE- 250: Performed by: HOSPITALIST

## 2021-01-09 PROCEDURE — 85027 COMPLETE CBC AUTOMATED: CPT

## 2021-01-09 PROCEDURE — 65660000000 HC RM CCU STEPDOWN

## 2021-01-09 PROCEDURE — 80053 COMPREHEN METABOLIC PANEL: CPT

## 2021-01-09 PROCEDURE — 85730 THROMBOPLASTIN TIME PARTIAL: CPT

## 2021-01-09 PROCEDURE — 85379 FIBRIN DEGRADATION QUANT: CPT

## 2021-01-09 PROCEDURE — 77030012341 HC CHMB SPCR OPTC MDI VYRM -A

## 2021-01-09 PROCEDURE — 77010033678 HC OXYGEN DAILY

## 2021-01-09 PROCEDURE — 74011000258 HC RX REV CODE- 258: Performed by: HOSPITALIST

## 2021-01-09 PROCEDURE — 74011250636 HC RX REV CODE- 250/636: Performed by: GENERAL ACUTE CARE HOSPITAL

## 2021-01-09 PROCEDURE — 36415 COLL VENOUS BLD VENIPUNCTURE: CPT

## 2021-01-09 PROCEDURE — 85384 FIBRINOGEN ACTIVITY: CPT

## 2021-01-09 PROCEDURE — 94640 AIRWAY INHALATION TREATMENT: CPT

## 2021-01-09 PROCEDURE — 74011250637 HC RX REV CODE- 250/637: Performed by: HOSPITALIST

## 2021-01-09 PROCEDURE — 74011250637 HC RX REV CODE- 250/637: Performed by: GENERAL ACUTE CARE HOSPITAL

## 2021-01-09 PROCEDURE — 74011250636 HC RX REV CODE- 250/636: Performed by: HOSPITALIST

## 2021-01-09 PROCEDURE — 85610 PROTHROMBIN TIME: CPT

## 2021-01-09 RX ADMIN — BUPROPION HYDROCHLORIDE 150 MG: 150 TABLET, EXTENDED RELEASE ORAL at 11:06

## 2021-01-09 RX ADMIN — REMDESIVIR 100 MG: 100 INJECTION, POWDER, LYOPHILIZED, FOR SOLUTION INTRAVENOUS at 21:35

## 2021-01-09 RX ADMIN — ZINC SULFATE 220 MG (50 MG) CAPSULE 1 CAPSULE: CAPSULE at 17:53

## 2021-01-09 RX ADMIN — ALBUTEROL SULFATE 2 PUFF: 90 AEROSOL, METERED RESPIRATORY (INHALATION) at 10:37

## 2021-01-09 RX ADMIN — FAMOTIDINE 40 MG: 20 TABLET, FILM COATED ORAL at 08:34

## 2021-01-09 RX ADMIN — Medication 10 ML: at 14:00

## 2021-01-09 RX ADMIN — CEFTRIAXONE 1 G: 1 INJECTION, POWDER, FOR SOLUTION INTRAMUSCULAR; INTRAVENOUS at 17:53

## 2021-01-09 RX ADMIN — ALBUTEROL SULFATE 2 PUFF: 90 AEROSOL, METERED RESPIRATORY (INHALATION) at 22:01

## 2021-01-09 RX ADMIN — BUPROPION HYDROCHLORIDE 150 MG: 150 TABLET, EXTENDED RELEASE ORAL at 18:00

## 2021-01-09 RX ADMIN — MELATONIN 3 MG: at 21:35

## 2021-01-09 RX ADMIN — ZINC SULFATE 220 MG (50 MG) CAPSULE 1 CAPSULE: CAPSULE at 08:34

## 2021-01-09 RX ADMIN — ENOXAPARIN SODIUM 40 MG: 40 INJECTION SUBCUTANEOUS at 11:07

## 2021-01-09 RX ADMIN — CHOLECALCIFEROL TAB 25 MCG (1000 UNIT) 2 TABLET: 25 TAB at 08:34

## 2021-01-09 RX ADMIN — Medication 10 ML: at 05:25

## 2021-01-09 RX ADMIN — OXYCODONE HYDROCHLORIDE AND ACETAMINOPHEN 500 MG: 500 TABLET ORAL at 08:33

## 2021-01-09 RX ADMIN — Medication 1 CAPSULE: at 08:34

## 2021-01-09 RX ADMIN — ALBUTEROL SULFATE 2 PUFF: 90 AEROSOL, METERED RESPIRATORY (INHALATION) at 14:14

## 2021-01-09 RX ADMIN — Medication 10 ML: at 21:35

## 2021-01-09 RX ADMIN — LEVOFLOXACIN 750 MG: 750 TABLET, FILM COATED ORAL at 17:53

## 2021-01-09 RX ADMIN — ENOXAPARIN SODIUM 40 MG: 40 INJECTION SUBCUTANEOUS at 21:36

## 2021-01-09 RX ADMIN — OXYCODONE HYDROCHLORIDE AND ACETAMINOPHEN 500 MG: 500 TABLET ORAL at 17:53

## 2021-01-09 RX ADMIN — DEXAMETHASONE 6 MG: 4 TABLET ORAL at 08:34

## 2021-01-09 NOTE — PROGRESS NOTES
Order received and acknowledged. OT discussed with nursing and pt had been up ad cruz in the room, getting in/out of bed and going to the bathroom. Pt does not need acute PT services. Will sign off. Can be re-consulted if there is a change in pt status and is in need of acute PT services.

## 2021-01-09 NOTE — PROGRESS NOTES
Occupational Therapy note:    Order acknowledged, chart reviewed, and spoke with nursing about patient. Per RN, patient has been up ad cruz in room, getting in/out of bed, and going to the bathroom. Patient does not need acute OT services at this time. Will complete order and sign off. Please re-consult if patient's medical status changes.     Bakari Daniel, OTR/L

## 2021-01-09 NOTE — PROGRESS NOTES
End of Shift Note    Bedside shift change report given to Ilan (oncoming nurse) by Sorin Torres RN (offgoing nurse). Report included the following information Kardex, MAR and Recent Results    Shift worked:  7p-7a     Shift summary and any significant changes:     no significant changes     Concerns for physician to address:  none     Zone phone for oncoming shift:   [phone]       Activity:  Activity Level: Up with Assistance  Number times ambulated in hallways past shift: 0  Number of times OOB to chair past shift: 0    Cardiac:   Cardiac Monitoring: Yes      Cardiac Rhythm: Normal sinus rhythm    Access:   Current line(s): PIV     Genitourinary:   Urinary status: voiding    Respiratory:   O2 Device: Nasal cannula  Chronic home O2 use?: NO  Incentive spirometer at bedside: YES  Actual Volume (ml): 1250 ml  GI:  Last Bowel Movement Date: 01/08/21  Current diet:  DIET REGULAR  Passing flatus: Tolerating current diet: YES  % Diet Eaten: 75 %    Pain Management:   Patient states pain is manageable on current regimen: N/A, no c/o pain    Skin:  Carroll Score: 19  Interventions: increase time out of bed    Patient Safety:  Fall Score:  Total Score: 2  Interventions: assistive device (walker, cane, etc), gripper socks and pt to call before getting OOB  High Fall Risk: Yes    Length of Stay:  Expected LOS: 5d 12h  Actual LOS: [address] Walker RN

## 2021-01-09 NOTE — PROGRESS NOTES
Problem: Falls - Risk of  Goal: *Absence of Falls  Description: Document Leafy Gleason Fall Risk and appropriate interventions in the flowsheet.   Outcome: Progressing Towards Goal  Note: Fall Risk Interventions:  Mobility Interventions: Bed/chair exit alarm, Communicate number of staff needed for ambulation/transfer, Patient to call before getting OOB, Utilize walker, cane, or other assistive device         Medication Interventions: Bed/chair exit alarm, Patient to call before getting OOB, Teach patient to arise slowly    Elimination Interventions: Elevated toilet seat

## 2021-01-09 NOTE — PROGRESS NOTES
Hospitalist Progress Note    NAME: Ramiro Frye   :  1960   MRN:  [id number]       Assessment / Plan:  Bilateral Covid-19 pneumonia, POA with  Acute hypoxic respiratory failure, POA 85% RA, requiring 3L O2  --CXR with bilateral mid and lower lung pna. Inflammatory markers elevated CRP 9.09, , fibrinogen 498, D-dimer 1.09  --start decadron 6mg IV daily x 10 days. Put on vitamin D, vitamin C, zinc for 7 days  --empiric abx with rocephin and levaquin. She finished Z-pack yesterday. --remdesivir discussed with patient and she is agreeable  --supplemental O2 3L with humidification, albuterol inhaler q6h    :  Continue with Decadron, Vit C, Zinc  Continue with empiric abx  Continue O2 supplementation - wean as tolerated     :  Continue with Decadron, Vit C, Zinc and Remdesivir  Continue O2 supplementation - wean as tolerated  Continue with empiric abx    :  Continue with Decadron, Vit C, Zinc and Remdesivir  Continue O2 supplementation - wean as tolerated  Continue with empiric abx     :  Continue with Decadron, Vit C, Zinc and Remdesivir - last dose today  Continue O2 supplementation - wean as tolerated, down to 2L NC  Continue with empiric abx       JULIEN on cpap at home  --ordered cpap with home pressure setting 7cm2 H20 qhs     Acute transaminitis, POA  --AST, ALT, and ALP acutely elevated, likely due to covid infection  --monitor cmp daily while on remdesivir     Mild acute kidney injury, Cr 1.2, baseline 0.76 in 2016  Acquired solitary kidney s/p right nephrectomy 2018 for renal cell CA  Diarrhea due to covid-19  --hold amitiza     Hx diverticulitis s/p sigmoid colectomy  --hold amitiza due to diarrhea    30.0 - 39.9 Obese / Body mass index is 36.4 kg/m². Code status: Full  Prophylaxis: Lovenox  Recommended Disposition: Home w/Family     Subjective:     Chief Complaint / Reason for Physician Visit  Feeling better but still SOB. Discussed with RN events overnight.      Review of Systems:  Symptom Y/N Comments  Symptom Y/N Comments   Fever/Chills    Chest Pain n    Poor Appetite    Edema     Cough y   Abdominal Pain n    Sputum    Joint Pain     SOB/CHIU y   Pruritis/Rash     Nausea/vomit    Tolerating PT/OT     Diarrhea    Tolerating Diet     Constipation    Other       Could NOT obtain due to:      Objective:     VITALS:   Last 24hrs VS reviewed since prior progress note. Most recent are:  Patient Vitals for the past 24 hrs:   Temp Pulse Resp BP SpO2   01/09/21 0822 98.6 °F (37 °C) 68 18 (!) 140/85 96 %   01/09/21 0305 97.8 °F (36.6 °C) 67 18 (!) 144/96 92 %   01/08/21 2303    138/82    01/08/21 2242 98.5 °F (36.9 °C) 65 18 (!) 164/96 94 %   01/08/21 2126     95 %   01/08/21 2100 98.5 °F (36.9 °C) 63 19 (!) 150/97 95 %   01/08/21 1508     92 %   01/08/21 1455 98.2 °F (36.8 °C) 65 19 (!) 143/87 91 %   01/08/21 1108 98.1 °F (36.7 °C) 69 22 108/77 93 %       Intake/Output Summary (Last 24 hours) at 1/9/2021 1841  Last data filed at 1/8/2021 1108  Gross per 24 hour   Intake 500 ml   Output    Net 500 ml        I had a face to face encounter and independently examined this patient on 1/9/2021, as outlined below:  PHYSICAL EXAM:  General: Alert, cooperative, no acute distress    EENT:  EOMI. Anicteric sclerae. MMM  Resp:  Coarse BS, on 2L NC  CV:  Regular  rhythm,  No edema  GI:  Soft, Non distended, Non tender. +Bowel sounds  Neurologic:  Alert and oriented X 3, normal speech,   Psych:   Good insight. Not anxious nor agitated  Skin:  No rashes.   No jaundice    Reviewed most current lab test results and cultures  YES  Reviewed most current radiology test results   YES  Review and summation of old records today    NO  Reviewed patient's current orders and MAR    YES  PMH/SH reviewed - no change compared to H&P  ________________________________________________________________________  Care Plan discussed with:    Comments   Patient x    Family      RN x    Care Manager     Consultant Multidiciplinary team rounds were held today with , nursing, pharmacist and clinical coordinator. Patient's plan of care was discussed; medications were reviewed and discharge planning was addressed. ________________________________________________________________________  Total NON critical care TIME:  35   Minutes    Total CRITICAL CARE TIME Spent:   Minutes non procedure based      Comments   >50% of visit spent in counseling and coordination of care     ________________________________________________________________________  Ricardo Price MD     Procedures: see electronic medical records for all procedures/Xrays and details which were not copied into this note but were reviewed prior to creation of Plan. LABS:  I reviewed today's most current labs and imaging studies.   Pertinent labs include:  Recent Labs     01/09/21  0027 01/08/21  0311 01/07/21  0305   WBC 10.3 8.7 7.1   HGB 12.2 12.2 12.6   HCT 35.5 34.7* 35.6    336 279     Recent Labs     01/09/21  0027 01/08/21  0311 01/07/21  0305    139 140   K 4.2 4.2 4.1    108 110*   CO2 24 24 24   * 93 102*   BUN 20 19 17   CREA 0.90 0.95 1.01   CA 8.3* 8.2* 8.1*   ALB 2.7* 2.6* 2.5*   TBILI 0.4 0.4 0.4   ALT 77 88* 113*   INR 1.3* 1.2* 1.1       Signed: Ricardo Price MD

## 2021-01-10 VITALS
RESPIRATION RATE: 19 BRPM | OXYGEN SATURATION: 94 % | WEIGHT: 205.47 LBS | TEMPERATURE: 98.3 F | BODY MASS INDEX: 36.41 KG/M2 | SYSTOLIC BLOOD PRESSURE: 97 MMHG | DIASTOLIC BLOOD PRESSURE: 61 MMHG | HEIGHT: 63 IN | HEART RATE: 76 BPM

## 2021-01-10 LAB
APTT PPP: 26.2 SEC (ref 22.1–31)
BACTERIA SPEC CULT: NORMAL
D DIMER PPP FEU-MCNC: 0.99 MG/L FEU (ref 0–0.65)
FIBRINOGEN PPP-MCNC: 321 MG/DL (ref 200–475)
INR PPP: 1.2 (ref 0.9–1.1)
PROTHROMBIN TIME: 12.8 SEC (ref 9–11.1)
SERVICE CMNT-IMP: NORMAL
THERAPEUTIC RANGE,PTTT: NORMAL SECS (ref 58–77)

## 2021-01-10 PROCEDURE — 94760 N-INVAS EAR/PLS OXIMETRY 1: CPT

## 2021-01-10 PROCEDURE — 74011250636 HC RX REV CODE- 250/636: Performed by: GENERAL ACUTE CARE HOSPITAL

## 2021-01-10 PROCEDURE — 77010033678 HC OXYGEN DAILY

## 2021-01-10 PROCEDURE — 85379 FIBRIN DEGRADATION QUANT: CPT

## 2021-01-10 PROCEDURE — 85730 THROMBOPLASTIN TIME PARTIAL: CPT

## 2021-01-10 PROCEDURE — 94640 AIRWAY INHALATION TREATMENT: CPT

## 2021-01-10 PROCEDURE — 36415 COLL VENOUS BLD VENIPUNCTURE: CPT

## 2021-01-10 PROCEDURE — 85384 FIBRINOGEN ACTIVITY: CPT

## 2021-01-10 PROCEDURE — 85610 PROTHROMBIN TIME: CPT

## 2021-01-10 PROCEDURE — 74011250637 HC RX REV CODE- 250/637: Performed by: HOSPITALIST

## 2021-01-10 RX ORDER — DEXAMETHASONE 6 MG/1
6 TABLET ORAL
Qty: 5 TAB | Refills: 0 | Status: SHIPPED | OUTPATIENT
Start: 2021-01-10 | End: 2021-02-11 | Stop reason: ALTCHOICE

## 2021-01-10 RX ORDER — ASCORBIC ACID 500 MG
500 TABLET ORAL 2 TIMES DAILY
Qty: 10 TAB | Refills: 0 | Status: SHIPPED | OUTPATIENT
Start: 2021-01-10 | End: 2021-04-19 | Stop reason: ALTCHOICE

## 2021-01-10 RX ORDER — ZINC SULFATE 50(220)MG
220 CAPSULE ORAL 2 TIMES DAILY
Qty: 5 CAP | Refills: 0 | Status: SHIPPED | OUTPATIENT
Start: 2021-01-10 | End: 2021-04-19 | Stop reason: ALTCHOICE

## 2021-01-10 RX ADMIN — Medication 10 ML: at 05:11

## 2021-01-10 RX ADMIN — DEXAMETHASONE 6 MG: 4 TABLET ORAL at 10:20

## 2021-01-10 RX ADMIN — ALBUTEROL SULFATE 2 PUFF: 90 AEROSOL, METERED RESPIRATORY (INHALATION) at 15:13

## 2021-01-10 RX ADMIN — ZINC SULFATE 220 MG (50 MG) CAPSULE 1 CAPSULE: CAPSULE at 10:21

## 2021-01-10 RX ADMIN — CHOLECALCIFEROL TAB 25 MCG (1000 UNIT) 2 TABLET: 25 TAB at 10:20

## 2021-01-10 RX ADMIN — ENOXAPARIN SODIUM 40 MG: 40 INJECTION SUBCUTANEOUS at 10:21

## 2021-01-10 RX ADMIN — BUPROPION HYDROCHLORIDE 150 MG: 150 TABLET, EXTENDED RELEASE ORAL at 10:20

## 2021-01-10 RX ADMIN — OXYCODONE HYDROCHLORIDE AND ACETAMINOPHEN 500 MG: 500 TABLET ORAL at 10:20

## 2021-01-10 RX ADMIN — FAMOTIDINE 40 MG: 20 TABLET, FILM COATED ORAL at 10:20

## 2021-01-10 RX ADMIN — ALBUTEROL SULFATE 2 PUFF: 90 AEROSOL, METERED RESPIRATORY (INHALATION) at 09:11

## 2021-01-10 RX ADMIN — Medication 1 CAPSULE: at 10:20

## 2021-01-10 NOTE — DISCHARGE INSTRUCTIONS
Patient Education        10 Things to Do When You Have COVID-19    Stay home. Don't go to school, work, or public areas. And don't use public transportation, ride-shares, or taxis unless you have no choice. Leave your home only if you need to get medical care. But call the doctor's office first so they know you're coming. And wear a cloth face cover. Ask before leaving isolation. Talk with your doctor or other health professional about when it will be safe for you to leave isolation. Wear a cloth face cover when you are around other people. It can help stop the spread of the virus when you cough or sneeze. Limit contact with people in your home. If possible, stay in a separate bedroom and use a separate bathroom. Avoid contact with pets and other animals. If possible, have a friend or family member care for them while you're sick. Cover your mouth and nose with a tissue when you cough or sneeze. Then throw the tissue in the trash right away. Wash your hands often, especially after you cough or sneeze. Use soap and water, and scrub for at least 20 seconds. If soap and water aren't available, use an alcohol-based hand . Don't share personal household items. These include bedding, towels, cups and glasses, and eating utensils. Clean and disinfect your home every day. Use household  or disinfectant wipes or sprays. Take special care to clean things that you grab with your hands. These include doorknobs, remote controls, phones, and handles on your refrigerator and microwave. And don't forget countertops, tabletops, bathrooms, and computer keyboards. Take acetaminophen (Tylenol) to relieve fever and body aches. Read and follow all instructions on the label. Current as of: July 10, 2020               Content Version: 12.6  © 2006-2020 [employer].    Care instructions adapted under license by [employer] (which disclaims liability or warranty for this information). If you have questions about a medical condition or this instruction, always ask your healthcare professional. [place] [address] any warranty or liability for your use of this information. [place] DISCHARGE DIAGNOSIS:  COVID pneumonia with hypoxia. Recovering  Mild elevation of liver enzymes (improving)  Obstructive sleep apnea  Solitary kidney with history of nephrectomy      MEDICATIONS:  · It is important that you take the medication exactly as they are prescribed. · Keep your medication in the bottles provided by the pharmacist and keep a list of the medication names, dosages, and times to be taken in your wallet. · Do not take other medications without consulting your doctor. Pain Management: per above medications    What to do at Home    Recommended diet:  Low salt    Recommended activity: Activity as tolerated    Continue with self quarantine to complete CDC recommended isolation period 14 days after the start of symptoms and 3 days from last recorded fever. I would recommend to continue quarantine until Jan 12th. At which time please have yourself checked again by your primary provider to reassess your physical fitness to return to work. If you have questions regarding the hospital related prescriptions or hospital related issues please call [address] [place] [address] at . You can always direct your questions to your primary care doctor if you are unable to reach your hospital physician; your PCP works as an extension of your hospital doctor just like your hospital doctor is an extension of your PCP for your time at the hospital [place], [facility]). If you experience any of the following symptoms then please call your primary care physician or return to the emergency room if you cannot get hold of your doctor:  Fever, chills, nausea, vomiting, diarrhea, change in mentation, falling, bleeding, shortness of breath.

## 2021-01-10 NOTE — PROGRESS NOTES
NENO:  1) Possible Home Oxygen Setup  2) Follow-up Care Appointments  3) Home with Family    Per PT/OT notes from yesterday, no recommendations for outpatient therapy or HH at discharge. Pt remains on continuous O2. Nursing to attempt to wean and complete O2 challenge prior to discharge. However if unable to do so, will need to arrange home oxygen setup.      Sheree Fernandez 29 Manager  [phone]

## 2021-01-10 NOTE — PROGRESS NOTES
End of Shift Note    Bedside shift change report given to Ilan (oncoming nurse) by Grecia Calderon (offgoing nurse). Report included the following information SBAR, Kardex, Intake/Output, MAR, Accordion and Recent Results    Shift worked:  0543-1195     Shift summary and any significant changes:     No significant changes or concerns. Pt slept well and vitals were stable throughout night. Concerns for physician to address:  none     Zone phone for oncoming shift:          Activity:  Activity Level: Up ad cruz  Number times ambulated in hallways past shift: 0  Number of times OOB to chair past shift: 0    Cardiac:   Cardiac Monitoring: Yes      Cardiac Rhythm: Normal sinus rhythm    Access:   Current line(s): PIV     Genitourinary:   Urinary status: voiding    Respiratory:   O2 Device: Nasal cannula  Chronic home O2 use?: NO  Incentive spirometer at bedside: YES  Actual Volume (ml): 1250 ml  GI:  Last Bowel Movement Date: 01/09/21  Current diet:  DIET REGULAR  Passing flatus: YES  Tolerating current diet: YES  % Diet Eaten: 75 %    Pain Management:   Patient states pain is manageable on current regimen: N/A    Skin:  Carroll Score: 20  Interventions: increase time out of bed    Patient Safety:  Fall Score:  Total Score: 1  Interventions: assistive device (walker, cane, etc) and gripper socks  High Fall Risk: Yes    Length of Stay:  Expected LOS: 5d 12h  [facility]

## 2021-01-11 ENCOUNTER — TELEPHONE (OUTPATIENT)
Dept: CASE MANAGEMENT | Age: 61
End: 2021-01-11

## 2021-01-11 NOTE — TELEPHONE ENCOUNTER
21 4:42 PM     Patient contacted regarding [id number] diagnosis\". Discussed COVID-19 related testing which was available at this time. Test results were positive. Patient informed of results, if available? yes     Care Transition Nurse/ Ambulatory Care Manager contacted the patient by telephone to perform post discharge assessment. Call within 2 business days of discharge: Yes Verified name and  with patient as identifiers. Provided introduction to self, and explanation of the CTN/ACM role, and reason for call due to risk factors for infection and/or exposure to COVID-19. Symptoms reviewed with patient who verbalized the following symptoms: fatigue, pain or aching joints, cough, confusion of unusual change in mental status, chills or shaking, sweating, diarrhea, dizziness/lightheadedness, cold, clammy and pale skin, flank pain, no new symptoms and no worsening symptoms      Due to no new or worsening symptoms encounter was not routed to provider for escalation. Discussed follow-up appointments. If no appointment was previously scheduled, appointment scheduling offered:  [address] follow up appointment(s): No future appointments. Non-[facility] follow up appointment(s): none     Advance Care Planning:   Does patient have an Advance Directive:  not on file; education provided. Primary Decision MakerScharlene Other Spouse - [phone]    Secondary Decision Maker: Marti Santoyo - Daughter - [phone]    Supplemental (Other) Decision Maker: Varun Linda - Sister - [phone]    Supplemental (Other) Decision Maker: Torri Gloria Son - [phone]    Patient has following risk factors of: pneumonia. CTN/ACM reviewed discharge instructions, medical action plan and red flags such as increased shortness of breath, increasing fever and signs of decompensation with patient who verbalized understanding.    Did not discuss exposure protocols and quarantine with CDC Guidelines What to do if you are sick with coronavirus disease 2019 because patient, who was given an opportunity for questions and concerns, denies having any today. The patient agrees to contact the PCP office for questions related to their healthcare. CTN/ACM provided contact information for future needs. Reviewed and educated patient on any new and changed medications related to discharge diagnosis. She states she has obtained all D/C medications and has F/U visit with PCP on 1/18. Patient/family/caregiver given information for [facility] and agrees to enroll yes  Patient's preferred e-mail: [email or web address].    Patient's preferred phone number: [phone]  Based on Loop alert triggers, patient will be contacted by nurse care manager for worsening symptoms. Pt will be further monitored by COVID Loop Team based on severity of symptoms and risk factors.

## 2021-01-11 NOTE — DISCHARGE SUMMARY
.                    Hospitalist Discharge Summary     Patient ID:  Darrel Ramos  [id number]  58 y.o.  1960 1/5/2021    PCP on record: Karyn Goldberg MD    Admit date: 1/5/2021  Discharge date and time: 1/10/2021    DISCHARGE DIAGNOSIS:      Bilateral Covid-19 pneumonia   JULIEN on cpap at home  elevated transaminases  Mild acute kidney injury, Cr 1.2, baseline 0.76 in 2016  Acquired solitary kidney s/p right nephrectomy 1/2018 for renal cell CA  Diarrhea due to covid-19  Hx diverticulitis s/p sigmoid colectomy  HTN      CONSULTATIONS:  None    Excerpted HPI from H&P of Dario Hart MD:  Darrel Ramos is a 64 y.o. female with HTN, JULIEN, CKD, hx renal cell CA s/p right nephrectomy who presents with complaint noted above. She was diagnosed with Covid infection on December 31, but has been having symptoms since December 28 with fever, chills, generalized weakness, nausea, diarrhea. He took a 5-day course of azithromycin and finished it yesterday. She was seen in the emergency room 2 days ago for diarrhea and fever of 102. 8.     Today she developed shortness of breath and chest tightness. In the ER she is hypoxic 85% on room air, requiring 3 L of oxygen. Chest x-ray shows new, extensive bilateral interstitial and patchy airspace disease bilateral lateral mid and lower lungs.     ______________________________________________________________________  DISCHARGE SUMMARY/HOSPITAL COURSE:  for full details see H&P, daily progress notes, labs, consult notes.      Bilateral Covid-19 pneumonia, POA with  Acute hypoxic respiratory failure, POA 85% RA, requiring 3L O2  Admission  CXR with bilateral mid and lower lung pna.  Inflammatory markers elevated CRP 9.09, , fibrinogen 498, D-dimer 1.09  --had completed  IV course of remdesivir   --was continued on dexamethasone to complete the remainder of 10 days as out patinet  --continued with supportive treatments vitamin C and Zinc  --oxygen requirements decreased and was weaned off oxygen and was ambulating without oxygen with O2 saturation 94% on room air       JULIEN on cpap at home  --ordered cpap with home pressure setting 7cm2 H20 qhs     Acute transaminitis, POA  --AST, ALT, and ALP were mildly elevated and are trending towards normal     Mild acute kidney injury, Cr 1.2, baseline 0.76 in 2016  Acquired solitary kidney s/p right nephrectomy 1/2018 for renal cell CA  Diarrhea due to covid-19  --held [place]     Hx diverticulitis s/p sigmoid colectomy  --held amitiza due to diarrhea    HTN   On HCTZ  discontinued      _______________________________________________________________________  Patient seen and examined by me on discharge day. Pertinent Findings:  Gen:    Not in distress  Chest: Clear lungs  CVS:   Regular rhythm. No edema  Abd:  Soft, not distended, not tender  Neuro:  Alert, no gross defeicit  _______________________________________________________________________  DISCHARGE MEDICATIONS:   Discharge Medication List as of 1/10/2021  4:41 PM      START taking these medications    Details   ascorbic acid, vitamin C, (VITAMIN C) 500 mg tablet Take 1 Tab by mouth two (2) times a day., Normal, Disp-10 Tab, R-0      dexAMETHasone (DECADRON) 6 mg tablet Take 1 Tab by mouth Daily (before breakfast). , Normal, Disp-5 Tab, R-0      zinc sulfate (ZINCATE) 220 (50) mg capsule Take 1 Cap by mouth two (2) times a day., Normal, Disp-5 Cap, R-0         CONTINUE these medications which have NOT CHANGED    Details   lubiPROStone (Amitiza) 8 mcg capsule Take 8 mcg by mouth two (2) times daily (with meals). , Historical Med      loperamide (IMODIUM) 2 mg capsule Take 1 Cap by mouth four (4) times daily as needed for Diarrhea for up to 10 days. , Normal, Disp-20 Cap, R-0      bran/gum/fib/celina/psyl/kelp/pec (FIBER 6 PO) fiber, Historical Med      buPROPion SR (WELLBUTRIN SR) 150 mg SR tablet Take 150 mg by mouth two (2) times a day., Historical Med      famotidine (PEPCID) 40 mg tablet Take 40 mg by mouth two (2) times a day., Historical Med      B.infantis-B.ani-B.long-B.bifi (PROBIOTIC 4X) 10-15 mg TbEC Take 10-15 mg by mouth daily. , Historical Med         STOP taking these medications       hydroCHLOROthiazide (HYDRODIURIL) 25 mg tablet Comments:   Reason for Stopping:                 Patient Follow Up Instructions: Activity: provided detailed instructions regarding quarantine.  To be reassessed by her primary provider before the end of the week to assess physical fitness to resume her work  Diet: low sodium        Follow-up Information     Follow up With Specialties Details Why Contact Info    Natalie Cheadle, MD Family Medicine In 5 days  [address] [facility]  [phone]          ________________________________________________________________    Risk of deterioration: Low    Condition at Discharge:  Stable  __________________________________________________________________    Disposition  Home with family, no needs    ____________________________________________________________________    Code Status: Full Code  ___________________________________________________________________      Total time in minutes spent coordinating this discharge (includes going over instructions, follow-up, prescriptions, and preparing report for sign off to her PCP) :  >30 minutes    Signed:  Tita Mcclure MD

## 2021-01-18 ENCOUNTER — PATIENT OUTREACH (OUTPATIENT)
Dept: CASE MANAGEMENT | Age: 61
End: 2021-01-18

## 2021-01-18 NOTE — PROGRESS NOTES
Yellow alert noted in remote symptom monitoring program. Messaged patient to notify Marivel Thomas if symptoms have worsened since yesterday or if they would like to have a nurse reach out.

## 2021-01-27 ENCOUNTER — TRANSCRIBE ORDER (OUTPATIENT)
Dept: GENERAL RADIOLOGY | Age: 61
End: 2021-01-27

## 2021-01-27 ENCOUNTER — HOSPITAL ENCOUNTER (OUTPATIENT)
Dept: GENERAL RADIOLOGY | Age: 61
Discharge: HOME | End: 2021-01-27
Attending: UROLOGY
Payer: COMMERCIAL

## 2021-01-27 DIAGNOSIS — C64.9 RENAL CELL CARCINOMA (HCC): Primary | ICD-10-CM

## 2021-01-27 DIAGNOSIS — C64.9 RENAL CELL CARCINOMA (HCC): ICD-10-CM

## 2021-01-27 PROCEDURE — 71046 X-RAY EXAM CHEST 2 VIEWS: CPT

## 2021-02-11 ENCOUNTER — OFFICE VISIT (OUTPATIENT)
Dept: PRIMARY CARE CLINIC | Age: 61
End: 2021-02-11

## 2021-02-11 VITALS
SYSTOLIC BLOOD PRESSURE: 137 MMHG | BODY MASS INDEX: 37.35 KG/M2 | HEART RATE: 94 BPM | DIASTOLIC BLOOD PRESSURE: 88 MMHG | WEIGHT: 210.8 LBS | HEIGHT: 63 IN | OXYGEN SATURATION: 98 % | RESPIRATION RATE: 17 BRPM | TEMPERATURE: 98.1 F

## 2021-02-11 DIAGNOSIS — I10 ESSENTIAL HYPERTENSION: Primary | ICD-10-CM

## 2021-02-11 DIAGNOSIS — R68.89 NOT FEELING GREAT: ICD-10-CM

## 2021-02-11 DIAGNOSIS — I10 ESSENTIAL HYPERTENSION: ICD-10-CM

## 2021-02-11 LAB
BILIRUB UR QL STRIP: NEGATIVE
GLUCOSE UR-MCNC: NEGATIVE MG/DL
KETONES P FAST UR STRIP-MCNC: NEGATIVE MG/DL
PH UR STRIP: 6.5 [PH] (ref 4.6–8)
PROT UR QL STRIP: NEGATIVE
SP GR UR STRIP: 1.02 (ref 1–1.03)
UA UROBILINOGEN AMB POC: NORMAL (ref 0.2–1)
URINALYSIS CLARITY POC: CLEAR
URINALYSIS COLOR POC: NORMAL
URINE BLOOD POC: NEGATIVE
URINE LEUKOCYTES POC: NEGATIVE
URINE NITRITES POC: NEGATIVE

## 2021-02-11 PROCEDURE — 81003 URINALYSIS AUTO W/O SCOPE: CPT | Performed by: NURSE PRACTITIONER

## 2021-02-11 PROCEDURE — 99213 OFFICE O/P EST LOW 20 MIN: CPT | Performed by: NURSE PRACTITIONER

## 2021-02-11 RX ORDER — LORAZEPAM 1 MG/1
TABLET ORAL
COMMUNITY
Start: 2020-12-02 | End: 2021-03-03 | Stop reason: ALTCHOICE

## 2021-02-11 RX ORDER — AZITHROMYCIN 250 MG/1
TABLET, FILM COATED ORAL
COMMUNITY
Start: 2020-12-31 | End: 2021-02-11 | Stop reason: ALTCHOICE

## 2021-02-11 RX ORDER — LORAZEPAM 1 MG/1
TABLET ORAL
COMMUNITY
Start: 2020-12-02 | End: 2021-02-12 | Stop reason: SDUPTHER

## 2021-02-11 RX ORDER — CALCIUM POLYCARBOPHIL 625 MG
TABLET ORAL
COMMUNITY
End: 2021-02-11 | Stop reason: SDUPTHER

## 2021-02-11 RX ORDER — FAMOTIDINE 20 MG/1
TABLET, FILM COATED ORAL
COMMUNITY
End: 2021-04-11 | Stop reason: ALTCHOICE

## 2021-02-11 RX ORDER — OMEPRAZOLE 20 MG/1
CAPSULE, DELAYED RELEASE ORAL
COMMUNITY
Start: 2020-06-10 | End: 2021-02-12 | Stop reason: ALTCHOICE

## 2021-02-11 RX ORDER — BUPROPION HYDROCHLORIDE 150 MG/1
TABLET ORAL
COMMUNITY
End: 2021-02-11 | Stop reason: SDUPTHER

## 2021-02-11 RX ORDER — NALTREXONE HYDROCHLORIDE 50 MG/1
TABLET, FILM COATED ORAL
COMMUNITY
End: 2021-04-11 | Stop reason: CLARIF

## 2021-02-11 RX ORDER — LUBIPROSTONE 8 UG/1
CAPSULE, GELATIN COATED ORAL
COMMUNITY
Start: 2020-10-08 | End: 2021-02-12 | Stop reason: SDUPTHER

## 2021-02-11 RX ORDER — KETOCONAZOLE 20 MG/G
CREAM TOPICAL
COMMUNITY
Start: 2020-11-23 | End: 2021-04-19 | Stop reason: ALTCHOICE

## 2021-02-11 RX ORDER — PREDNISONE 5 MG/1
TABLET ORAL
COMMUNITY
Start: 2020-10-26 | End: 2021-02-11 | Stop reason: ALTCHOICE

## 2021-02-11 NOTE — PATIENT INSTRUCTIONS
High Blood Pressure: Care Instructions Overview It's normal for blood pressure to go up and down throughout the day. But if it stays up, you have high blood pressure. Another name for high blood pressure is hypertension. Despite what a lot of people think, high blood pressure usually doesn't cause headaches or make you feel dizzy or lightheaded. It usually has no symptoms. But it does increase your risk of stroke, heart attack, and other problems. You and your doctor will talk about your risks of these problems based on your blood pressure. Your doctor will give you a goal for your blood pressure. Your goal will be based on your health and your age. Lifestyle changes, such as eating healthy and being active, are always important to help lower blood pressure. You might also take medicine to reach your blood pressure goal. 
Follow-up care is a key part of your treatment and safety. Be sure to make and go to all appointments, and call your doctor if you are having problems. It's also a good idea to know your test results and keep a list of the medicines you take. How can you care for yourself at home? Medical treatment · If you stop taking your medicine, your blood pressure will go back up. You may take one or more types of medicine to lower your blood pressure. Be safe with medicines. Take your medicine exactly as prescribed. Call your doctor if you think you are having a problem with your medicine. · Talk to your doctor before you start taking aspirin every day. Aspirin can help certain people lower their risk of a heart attack or stroke. But taking aspirin isn't right for everyone, because it can cause serious bleeding. · See your doctor regularly. You may need to see the doctor more often at first or until your blood pressure comes down. · If you are taking blood pressure medicine, talk to your doctor before you take decongestants or anti-inflammatory medicine, such as ibuprofen. Some of these medicines can raise blood pressure. · Learn how to check your blood pressure at home. Lifestyle changes · Stay at a healthy weight. This is especially important if you put on weight around the waist. Losing even 10 pounds can help you lower your blood pressure. · If your doctor recommends it, get more exercise. Walking is a good choice. Bit by bit, increase the amount you walk every day. Try for at least 30 minutes on most days of the week. You also may want to swim, bike, or do other activities. · Avoid or limit alcohol. Talk to your doctor about whether you can drink any alcohol. · Try to limit how much sodium you eat to less than 2,300 milligrams (mg) a day. Your doctor may ask you to try to eat less than 1,500 mg a day. · Eat plenty of fruits (such as bananas and oranges), vegetables, legumes, whole grains, and low-fat dairy products. · Lower the amount of saturated fat in your diet. Saturated fat is found in animal products such as milk, cheese, and meat. Limiting these foods may help you lose weight and also lower your risk for heart disease. · Do not smoke. Smoking increases your risk for heart attack and stroke. If you need help quitting, talk to your doctor about stop-smoking programs and medicines. These can increase your chances of quitting for good. When should you call for help? Call  911 anytime you think you may need emergency care. This may mean having symptoms that suggest that your blood pressure is causing a serious heart or blood vessel problem. Your blood pressure may be over 180/120. For example, call 911 if: 
  · You have symptoms of a heart attack. These may include: 
? Chest pain or pressure, or a strange feeling in the chest. 
? Sweating. ? Shortness of breath. ? Nausea or vomiting. ? Pain, pressure, or a strange feeling in the back, neck, jaw, or upper belly or in one or both shoulders or arms. ? Lightheadedness or sudden weakness. ? A fast or irregular heartbeat.  
  · You have symptoms of a stroke. These may include: 
? Sudden numbness, tingling, weakness, or loss of movement in your face, arm, or leg, especially on only one side of your body. ? Sudden vision changes. ? Sudden trouble speaking. ? Sudden confusion or trouble understanding simple statements. ? Sudden problems with walking or balance. ? A sudden, severe headache that is different from past headaches.  
  · You have severe back or belly pain. Do not wait until your blood pressure comes down on its own. Get help right away. Call your doctor now or seek immediate care if: 
  · Your blood pressure is much higher than normal (such as 180/120 or higher), but you don't have symptoms.  
  · You think high blood pressure is causing symptoms, such as: 
? Severe headache. 
? Blurry vision. Watch closely for changes in your health, and be sure to contact your doctor if: 
  · Your blood pressure measures higher than your doctor recommends at least 2 times. That means the top number is higher or the bottom number is higher, or both.  
  · You think you may be having side effects from your blood pressure medicine. Where can you learn more? Go to http://www.gray.com/ Enter P609 in the search box to learn more about \"High Blood Pressure: Care Instructions. \" Current as of: December 16, 2019               Content Version: 12.6 © 6295-4420 [employer]. Care instructions adapted under license by [employer] (which disclaims liability or warranty for this information). If you have questions about a medical condition or this instruction, always ask your healthcare professional. [address] any warranty or liability for your use of this information.

## 2021-02-11 NOTE — PROGRESS NOTES
HISTORY OF PRESENT ILLNESS  Ramiro Frye is a 64 y.o. female. HPI  Chief Complaint   Patient presents with    Hypertension     Pt States \" today i went to the doctor it was 140 over 90 but now its higher, was took off bp medication, took a half a bp medication and was told by the pulmonarust to get back on bp medication. i i was hospitalized for covid and i have 1 kidney \". Pt presents with complaints of elevated blood pressure. She was hospitalized in early January for bilateral Covid-19 pneumonia. She was on oxygen and never required intubation or ventilator support. She was found to have mild acute kidney injury with Cr. 1.2. This normalized to 0.9 at discharge. History of renal carcinoma with nephrectomy so has solitary kidney. Followed by [facility]. She has had extensive follow-up visits with pulmonary, cardiology specialists and her PCP. Had recent virtual visit with Cardiology and reports that her Echo and holter monitor tests were normal.  Had visit with Pulmonary today and BP was elevated 140's/100's. Pulmonary advised her to resume her HCTZ 12.5mg which she took at 3pm this afternoon. She has home monitor and monitors daily. She is worried about elevated readings at home this afternoon. States she doesn't feel \"right\", feels jittery and has slight headache today. She denies any CP, swelling, or visual disturbances. She tried calling PCP today and got answering service with no call back.     PCP - Dr. Jessica Redding    Past Medical History:   Diagnosis Date    Chronic kidney disease     Diverticulosis     GERD (gastroesophageal reflux disease)     Hypertension     Renal carcinoma, right ([address]     kidney    Sleep apnea      Past Surgical History:   Procedure Laterality Date    HX CERVICAL DISKECTOMY      HX  SECTION   and     HX CHOLECYSTECTOMY      HX CHOLECYSTECTOMY      HX GYN      ablation    HX GYN  2015    hysterectomy    HX NEPHRECTOMY Right 01/2018    HX ORTHOPAEDIC      Neck Surgery.  HX ORTHOPAEDIC Left     knee surgery x 3    HX OTHER SURGICAL  11/2016    colon resection    HX ROTATOR CUFF REPAIR Right 2006    HX TUMOR REMOVAL  01/2018    R Kidney      Current Outpatient Medications   Medication Instructions    ascorbic acid (vitamin C) (VITAMIN C) 500 mg, Oral, 2 TIMES DAILY    B.infantis-B.ani-B.long-B.bifi (PROBIOTIC 4X) 10-15 mg TbEC 10-15 mg, Oral, DAILY    bran/gum/fib/celina/psyl/kelp/pec (FIBER 6 PO) fiber    buPROPion SR (WELLBUTRIN SR) 150 mg, Oral, 2 TIMES DAILY    famotidine (Pepcid) 20 mg tablet Pepcid 20 mg tablet   Take 2 tablets twice a day by oral route.  famotidine (PEPCID) 40 mg, Oral, 2 TIMES DAILY    ketoconazole (NIZORAL) 2 % topical cream APPLY TO THE AFFECTED AREA TWICE DAILY FOR 1 MONTH    LORazepam (ATIVAN) 1 mg tablet PLEASE SEE ATTACHED FOR DETAILED DIRECTIONS    LORazepam (ATIVAN) 1 mg tablet Take one pill 2 hours prior to injection, may take one more pill 30 minutes prior to injection if needed    lubiPROStone (Amitiza) 8 mcg capsule No dose, route, or frequency recorded.  lubiPROStone (AMITIZA) 8 mcg, Oral, 2 TIMES DAILY WITH MEALS    naltrexone (DEPADE) 50 mg tablet naltrexone 50 mg tablet   one po in am.    omeprazole (PRILOSEC) 20 mg capsule TAKE 1 CAPSULE BY MOUTH ONCE A DAY BEFORE BREAKFAST    zinc sulfate (ZINCATE) 220 mg, Oral, 2 TIMES DAILY     No Known Allergies      Review of Systems   Constitutional: Positive for malaise/fatigue. Negative for chills and fever. Eyes: Negative for blurred vision and double vision. Respiratory: Negative for cough and shortness of breath. Cardiovascular: Negative for chest pain and leg swelling. Neurological: Positive for headaches. Physical Exam  Constitutional:       General: She is not in acute distress. Appearance: Normal appearance. She is not ill-appearing or toxic-appearing. HENT:      Head: Normocephalic and atraumatic.    Eyes: Extraocular Movements: Extraocular movements intact. Conjunctiva/sclera: Conjunctivae normal.      Pupils: Pupils are equal, round, and reactive to light. Neck:      Musculoskeletal: Normal range of motion and neck supple. Cardiovascular:      Rate and Rhythm: Normal rate and regular rhythm. Pulses: Normal pulses. Musculoskeletal:      Right lower leg: No edema. Left lower leg: No edema. Skin:     General: Skin is warm and dry. Neurological:      General: No focal deficit present. Mental Status: She is alert and oriented to person, place, and time. Coordination: Coordination normal.      Gait: Gait normal.       Manual by me:  112/74 right  122/88 left  137/88 left - machine - per pt request    ASSESSMENT and PLAN    ICD-10-CM ICD-9-CM    1. Essential hypertension  L39 209.3 METABOLIC PANEL, COMPREHENSIVE      URINALYSIS W/ RFLX MICROSCOPIC      AMB POC URINALYSIS DIP STICK AUTO W/O MICRO   2. Not feeling great  R68.89 780.99      Reassured pt. BP responding well to HCTZ 12.5mg. Advised to continue this dose daily and f/u with her PCP. She has bottle of med with her today and denies need for new prescription. Will check labs. Continue to monitor and record BP readings daily. F/u prn.     Mauro Weber NP

## 2021-02-11 NOTE — PROGRESS NOTES
Jas Cobos is a 64 y.o. female    Room:4    Chief Complaint   Patient presents with    Hypertension     Pt States \" today i went to the doctor it was 140 over 90 but now its higher, was took off bp medication, took a half a bp medication and was told by the pulmonarust to get back on bp medication. i i was hospitalized for covid and i have 1 kidney \". Visit Vitals  BP (!) 154/99 (BP 1 Location: Left upper arm, BP Patient Position: Sitting, BP Cuff Size: Large adult)   Pulse 94   Temp 98.1 °F (36.7 °C) (Oral)   Resp 17   Ht 5' 3\" (1.6 m)   Wt 210 lb 12.8 oz (95.6 kg)   SpO2 98%   BMI 37.34 kg/m²       Pain Scale: 0 - No pain/10    1. Have you been to the ER, urgent care clinic since your last visit? Hospitalized since your last visit? ER , was released from the hospital on the 10th     2. Have you seen or consulted any other health care providers outside of the [address] [place] [address] since your last visit? Include any pap smears or colon screening.   no

## 2021-02-12 ENCOUNTER — TELEPHONE (OUTPATIENT)
Dept: PRIMARY CARE CLINIC | Age: 61
End: 2021-02-12

## 2021-02-12 ENCOUNTER — OFFICE VISIT (OUTPATIENT)
Dept: PRIMARY CARE CLINIC | Age: 61
End: 2021-02-12

## 2021-02-12 VITALS
RESPIRATION RATE: 16 BRPM | TEMPERATURE: 98.1 F | BODY MASS INDEX: 36.86 KG/M2 | WEIGHT: 208 LBS | OXYGEN SATURATION: 96 % | DIASTOLIC BLOOD PRESSURE: 82 MMHG | HEIGHT: 63 IN | HEART RATE: 100 BPM | SYSTOLIC BLOOD PRESSURE: 122 MMHG

## 2021-02-12 DIAGNOSIS — Z86.16 HISTORY OF COVID-19: ICD-10-CM

## 2021-02-12 DIAGNOSIS — I10 HYPERTENSION, UNSPECIFIED TYPE: ICD-10-CM

## 2021-02-12 DIAGNOSIS — B02.9 HERPES ZOSTER WITHOUT COMPLICATION: Primary | ICD-10-CM

## 2021-02-12 DIAGNOSIS — F41.8 ANXIETY ABOUT HEALTH: ICD-10-CM

## 2021-02-12 PROCEDURE — 99214 OFFICE O/P EST MOD 30 MIN: CPT | Performed by: NURSE PRACTITIONER

## 2021-02-12 RX ORDER — VALACYCLOVIR HYDROCHLORIDE 1 G/1
1000 TABLET, FILM COATED ORAL 3 TIMES DAILY
Qty: 21 TAB | Refills: 0 | Status: SHIPPED | OUTPATIENT
Start: 2021-02-12 | End: 2021-02-19

## 2021-02-12 NOTE — PATIENT INSTRUCTIONS

## 2021-02-12 NOTE — PROGRESS NOTES
RM4    No chief complaint on file.       Visit Vitals  /82 (BP 1 Location: Left arm)   Pulse 100   Temp 98.1 °F (36.7 °C) (Skin)   Resp 16   Ht 5' 3\" (1.6 m)   Wt 208 lb (94.3 kg)   SpO2 96%   BMI 36.85 kg/m²

## 2021-02-12 NOTE — PROGRESS NOTES
HISTORY OF PRESENT ILLNESS  Mandi Clemens is a 64 y.o. female. Pt presents with complaints of elevated blood pressure, not feeling well and new onset of burning  facial rash that started 30 minutes ago. Started on left cheek. Was seen here last evening for same. Blood pressure was noted to be WNL. Did speak to her PCP last evening and was told to take whole HCTZ. Took 1/2 dose HCTZ this am for BP. She reports when taking her blood pressure at home,\" the bottom number is aways above 100. \" Denies headache, blurred vision, speech problems. As of note-  She was hospitalized in early January for Covid-19 with bilateral pneumonia. She did require oxygen but not ventilator support. She also has history of mild acute kidney and  a  history of renal carcinoma with nephrectomy / has solitary kidney. Is under the care of a  Pulmonary and cardiology specialists and her PCP. Had visit with Pulmonary 2021 and BP was reported as elevated. Is on day 6 or 7 of PCN for impending root canal .      Rash   The history is provided by the patient. This is a new problem. The current episode started less than 1 hour ago. The problem has been gradually worsening. The problem is associated with nothing. There has been no fever. The rash is present on the face. The patient is experiencing no pain. Pertinent negatives include no itching. Past Medical History:   Diagnosis Date    Chronic kidney disease     Diverticulosis     GERD (gastroesophageal reflux disease)     Hypertension     Renal carcinoma, right ([address]     kidney    Sleep apnea      Past Surgical History:   Procedure Laterality Date    HX CERVICAL DISKECTOMY      HX  SECTION   and     HX CHOLECYSTECTOMY      HX CHOLECYSTECTOMY      HX GYN      ablation    HX GYN  2015    hysterectomy    HX NEPHRECTOMY Right 2018    HX ORTHOPAEDIC      Neck Surgery.      HX ORTHOPAEDIC Left     knee surgery x 3    HX OTHER SURGICAL  2016 colon resection    HX ROTATOR CUFF REPAIR Right 2006    HX TUMOR REMOVAL  01/2018    R Kidney      No Known Allergies    Review of Systems   Constitutional: Negative for fever and weight loss. Eyes: Negative for blurred vision, double vision, discharge and redness. Respiratory: Negative for cough and shortness of breath. Cardiovascular: Negative for chest pain and palpitations. Gastrointestinal: Negative for abdominal pain. Musculoskeletal: Negative for myalgias and neck pain. Skin: Positive for rash. Negative for itching. Neurological: Negative for dizziness and headaches. Psychiatric/Behavioral: The patient is nervous/anxious. Physical Exam  Vitals signs and nursing note reviewed. Constitutional:       Appearance: Normal appearance. She is obese. HENT:      Head: Normocephalic and atraumatic. Eyes:      Pupils: Pupils are equal, round, and reactive to light. Cardiovascular:      Rate and Rhythm: Normal rate. Pulses: Normal pulses. Pulmonary:      Effort: Pulmonary effort is normal.   Skin:     Findings: Rash present. Rash is macular and papular. Comments: 4 cm slightly raised patch on left cheek anterior . Faint slightly erythematous patch on left forehead below hairline. Mildly tender to touch. No vesicles. Neurological:      General: No focal deficit present. Mental Status: She is alert. Psychiatric:         Attention and Perception: Attention normal.         Mood and Affect: Mood is anxious. Affect is tearful. Behavior: Behavior is cooperative. Thought Content: Thought content does not include suicidal ideation. Thought content does not include suicidal plan. Comments: Admits to anxiety related to recent illness. Tearful at times. ASSESSMENT and PLAN    ICD-10-CM ICD-9-CM    1. Herpes zoster without complication  Q55.1 111.3    2. Hypertension, unspecified type  I10 401.9    3. History of COVID-19  Z86.16     4.  Anxiety about health  F41.8 300.09      Encounter Diagnoses   Name Primary?  Herpes zoster without complication Yes    Hypertension, unspecified type     History of COVID-19     Anxiety about health      Orders Placed This Encounter    valACYclovir (VALTREX) 1 gram tablet   Medication as directed. Take with food. Continue to take blood pressure medication at home as directed. When following with primary care provider, take your blood pressure cuff to the appointment with you for comparison. Your blood pressure in the office today was within normal limits. Consider also discussing with your doctor treatment for anxiety/depression regarding recent illness. If you are unable to get appointment next week, return to clinic to see myself or Rachel Alva. It was a pleasure to see you in the office today. Please call if you have any further questions or concerns. I am available through the portal system.      Signed By: PEARL Gazra     February 12, 2021

## 2021-02-12 NOTE — TELEPHONE ENCOUNTER
Patient called. Stating her heart was racing since our visit this am. Noted HR was 110-115 on pulse ox. At home. I informed  patient her HR was 100 and regular at today's visit and 94 last evening. She states its usually 60's. She notes she just doesn't feel right. \" Denied nausea, chest pain, palpitations, diaphoresis, dizziness. Encouraged to either call her cardiologist for a virtual visit or be seen in ER. Explained that diagnosis of shingles will not elevate HR unless febrile. Patient understood instructions.

## 2021-02-15 ENCOUNTER — TRANSCRIBE ORDER (OUTPATIENT)
Dept: SCHEDULING | Age: 61
End: 2021-02-15

## 2021-02-15 DIAGNOSIS — R93.89 ABNORMAL CHEST X-RAY: Primary | ICD-10-CM

## 2021-02-15 LAB
ALBUMIN SERPL-MCNC: 4.5 G/DL (ref 3.8–4.9)
ALBUMIN/GLOB SERPL: 2 {RATIO} (ref 1.2–2.2)
ALP SERPL-CCNC: 91 IU/L (ref 39–117)
ALT SERPL-CCNC: 29 IU/L (ref 0–32)
AST SERPL-CCNC: 21 IU/L (ref 0–40)
BILIRUB SERPL-MCNC: 0.6 MG/DL (ref 0–1.2)
BUN SERPL-MCNC: 11 MG/DL (ref 6–24)
BUN/CREAT SERPL: 11 (ref 9–23)
CALCIUM SERPL-MCNC: 9.7 MG/DL (ref 8.7–10.2)
CHLORIDE SERPL-SCNC: 104 MMOL/L (ref 96–106)
CO2 SERPL-SCNC: 20 MMOL/L (ref 20–29)
CREAT SERPL-MCNC: 1.03 MG/DL (ref 0.57–1)
GLOBULIN SER CALC-MCNC: 2.2 G/DL (ref 1.5–4.5)
GLUCOSE SERPL-MCNC: 96 MG/DL (ref 65–99)
GLUCOSE UR QL: NORMAL
KETONES UR QL STRIP: NORMAL
PH UR STRIP: NORMAL [PH]
POTASSIUM SERPL-SCNC: 4.7 MMOL/L (ref 3.5–5.2)
PROT SERPL-MCNC: 6.7 G/DL (ref 6–8.5)
PROT UR QL STRIP: NORMAL
SODIUM SERPL-SCNC: 143 MMOL/L (ref 134–144)
SP GR UR: NORMAL
SPECIMEN STATUS REPORT, ROLRST: NORMAL

## 2021-02-16 ENCOUNTER — DOCUMENTATION ONLY (OUTPATIENT)
Dept: PRIMARY CARE CLINIC | Age: 61
End: 2021-02-16

## 2021-02-16 ENCOUNTER — TELEPHONE (OUTPATIENT)
Dept: PRIMARY CARE CLINIC | Age: 61
End: 2021-02-16

## 2021-02-16 NOTE — TELEPHONE ENCOUNTER
Spoke with pt, after verifying pt name iman holtGosia Went over lab results and recommendations. Pt voiced understanding. Pt stated \" she still has the red spot on her cheek but has not had any other spots pop up. \" Pt would like to know if she should still take the anti viral med for possible shingles.        Please advise, thank you

## 2021-02-16 NOTE — PROGRESS NOTES
Please inform pt that labs show that her creatinine (kidney function test) was slightly elevated at 1.03 which is slightly increased from 0.90 one month ago. Other labs normal (electrolytes, glucose, and liver function tests). Recommend she continue taking her BP med per her PCP directions and f/u with her PCP.

## 2021-02-16 NOTE — TELEPHONE ENCOUNTER
----- Message from Luna Alegria NP sent at 2/16/2021  8:32 AM EST -----  Please inform pt that labs show that her creatinine (kidney function test) was slightly elevated at 1.03 which is slightly increased from 0.90 one month ago. Other labs normal (electrolytes, glucose, and liver function tests). Recommend she continue taking her BP med per her PCP directions and f/u with her PCP.

## 2021-02-16 NOTE — TELEPHONE ENCOUNTER
The fact that the rash has not worsened or spread may signify that the Valtrex is working. The caution with this medication is that her renal function should be monitored especially given her history and current slightly elevated Creatinine. I would say she should continue the medication given that it is on her face and have close follow-up with her PCP/recheck labs.

## 2021-02-16 NOTE — PROGRESS NOTES
Lab urinalysis with reflex micro was not performed as collected in incorrect tube. Spoke with Bang Benson MA and CARMEN Pereira LPN about this. Dipstick urinalysis in office was performed on date of visit which was normal.  Pt needs PCP f/u.

## 2021-02-22 ENCOUNTER — HOSPITAL ENCOUNTER (OUTPATIENT)
Dept: CT IMAGING | Age: 61
Discharge: HOME | End: 2021-02-22
Payer: COMMERCIAL

## 2021-02-22 DIAGNOSIS — R93.89 ABNORMAL CHEST X-RAY: ICD-10-CM

## 2021-02-22 PROCEDURE — 71250 CT THORAX DX C-: CPT | Performed by: INTERNAL MEDICINE

## 2021-03-03 ENCOUNTER — OFFICE VISIT (OUTPATIENT)
Dept: PRIMARY CARE CLINIC | Age: 61
End: 2021-03-03

## 2021-03-03 VITALS
HEIGHT: 63 IN | SYSTOLIC BLOOD PRESSURE: 122 MMHG | TEMPERATURE: 98.3 F | OXYGEN SATURATION: 98 % | RESPIRATION RATE: 16 BRPM | HEART RATE: 86 BPM | BODY MASS INDEX: 37.39 KG/M2 | WEIGHT: 211 LBS | DIASTOLIC BLOOD PRESSURE: 82 MMHG

## 2021-03-03 DIAGNOSIS — I10 ESSENTIAL HYPERTENSION: ICD-10-CM

## 2021-03-03 DIAGNOSIS — R00.2 PALPITATIONS: Primary | ICD-10-CM

## 2021-03-03 DIAGNOSIS — Z86.16 HISTORY OF COVID-19: ICD-10-CM

## 2021-03-03 PROCEDURE — 93000 ELECTROCARDIOGRAM COMPLETE: CPT | Performed by: NURSE PRACTITIONER

## 2021-03-03 PROCEDURE — 99213 OFFICE O/P EST LOW 20 MIN: CPT | Performed by: NURSE PRACTITIONER

## 2021-03-03 RX ORDER — HYDROCHLOROTHIAZIDE 25 MG/1
25 TABLET ORAL DAILY
COMMUNITY

## 2021-03-04 NOTE — PATIENT INSTRUCTIONS
Palpitations: Care Instructions Your Care Instructions Heart palpitations are the uncomfortable sensation that your heart is beating fast or irregularly. You might feel pounding or fluttering in your chest. It might feel like your heart is skipping a beat. Although palpitations may be caused by a heart problem, they also occur because of stress, fatigue, or use of alcohol, caffeine, or nicotine. Many medicines, including diet pills, antihistamines, decongestants, and some herbal products, can cause heart palpitations. Nearly everyone has palpitations from time to time. Depending on your symptoms, your doctor may need to do more tests to try to find the cause of your palpitations. Follow-up care is a key part of your treatment and safety. Be sure to make and go to all appointments, and call your doctor if you are having problems. It's also a good idea to know your test results and keep a list of the medicines you take. How can you care for yourself at home? · Avoid caffeine, nicotine, and excess alcohol. · Do not take illegal drugs, such as methamphetamines and cocaine. · Do not take weight loss or diet medicines unless you talk with your doctor first. 
· Get plenty of sleep. · Do not overeat. · If you have palpitations again, take deep breaths and try to relax. This may slow a racing heart. · If you start to feel lightheaded, lie down to avoid injuries that might result if you pass out and fall down. · Keep a record of your palpitations and bring it to your next doctor's appointment. Write down: ? The date and time. ? Your pulse. (If your heart is beating fast, it may be hard to count your pulse.) ? What you were doing when the palpitations started. ? How long the palpitations lasted. ? Any other symptoms. · If an activity causes palpitations, slow down or stop. Talk to your doctor before you do that activity again. · Take your medicines exactly as prescribed. Call your doctor if you think you are having a problem with your medicine. When should you call for help? Call 911 anytime you think you may need emergency care. For example, call if: 
  · You passed out (lost consciousness).  
  · You have symptoms of a heart attack. These may include: 
? Chest pain or pressure, or a strange feeling in the chest. 
? Sweating. ? Shortness of breath. ? Pain, pressure, or a strange feeling in the back, neck, jaw, or upper belly or in one or both shoulders or arms. ? Lightheadedness or sudden weakness. ? A fast or irregular heartbeat. After you call 911, the  may tell you to chew 1 adult-strength or 2 to 4 low-dose aspirin. Wait for an ambulance. Do not try to drive yourself.  
  · You have symptoms of a stroke. These may include: 
? Sudden numbness, tingling, weakness, or loss of movement in your face, arm, or leg, especially on only one side of your body. ? Sudden vision changes. ? Sudden trouble speaking. ? Sudden confusion or trouble understanding simple statements. ? Sudden problems with walking or balance. ? A sudden, severe headache that is different from past headaches. Call your doctor now or seek immediate medical care if: 
  · You have heart palpitations and: ? Are dizzy or lightheaded, or you feel like you may faint. ? Have new or increased shortness of breath. Watch closely for changes in your health, and be sure to contact your doctor if: 
  · You continue to have heart palpitations. Where can you learn more? Go to http://anna-sanam.info/ Enter R508 in the search box to learn more about \"Palpitations: Care Instructions. \" Current as of: December 16, 2019               Content Version: 12.6 © 1312-3176 [employer]. Care instructions adapted under license by [employer] (which disclaims liability or warranty for this information). If you have questions about a medical condition or this instruction, always ask your healthcare professional. [address] any warranty or liability for your use of this information.

## 2021-03-04 NOTE — PROGRESS NOTES
HISTORY OF PRESENT ILLNESS  Ana Trinh is a 64 y.o. female. HPI  Chief Complaint   Patient presents with    Hypertension     Pt has BP concerns - Pt hospitalized in  for covid. Pt has been seen by cardio and all test normal     Pt presents with complaints of palpitations and throbbing in temples this evening. She was just sitting down when symptoms occurred. Symptoms lasted few minutes then resolved. She checked her BP with her new BP machine with reading of 150/100. Denies any previous episodes similar to this. Pt was seen in office last month here for HTN and has been restarted on HCTZ 25mg daily. States BP readings at home have been 120-130's/70-80's. She then had shingles and recently had a root canal.    She was hospitalized in early January for bilateral Covid-19 pneumonia. She was on oxygen and never required intubation or ventilator support. She was found to have mild acute kidney injury with Cr. 1.2. This normalized to 0.9 at discharge. History of renal carcinoma with nephrectomy so has solitary kidney. Followed by [place] [facility]. She has had extensive follow-up visits with pulmonary, cardiology specialists and her PCP. Had recent virtual visit with Cardiology and reports that her Echo and 48 hour holter monitor tests were normal (testing was done approx 1 mo ago - no records for review). Pt had f/u visit with Dr. Chaya Rollins, Pulmonary today. Recent chest CT showed stable post-inflammatory changes which were stable.     Past Medical History:   Diagnosis Date    Chronic kidney disease     Diverticulosis     GERD (gastroesophageal reflux disease)     Hypertension     Renal carcinoma, right ([address]     kidney    Sleep apnea      Past Surgical History:   Procedure Laterality Date    HX CERVICAL DISKECTOMY      HX  SECTION   and     HX CHOLECYSTECTOMY      HX CHOLECYSTECTOMY      HX GYN      ablation    HX GYN  2015    hysterectomy    HX NEPHRECTOMY Right 01/2018    HX ORTHOPAEDIC      Neck Surgery.  HX ORTHOPAEDIC Left     knee surgery x 3    HX OTHER SURGICAL  11/2016    colon resection    HX ROTATOR CUFF REPAIR Right 2006    HX TUMOR REMOVAL  01/2018    R Kidney      No Known Allergies        Review of Systems   Constitutional: Negative for fever, malaise/fatigue and weight loss. Respiratory: Negative for cough, shortness of breath and wheezing. Cardiovascular: Positive for palpitations. Negative for chest pain and leg swelling. Gastrointestinal: Negative for abdominal pain, nausea and vomiting. Psychiatric/Behavioral: The patient is not nervous/anxious. Physical Exam  Constitutional:       General: She is not in acute distress. Appearance: Normal appearance. She is not ill-appearing, toxic-appearing or diaphoretic. HENT:      Head: Normocephalic and atraumatic. Eyes:      Extraocular Movements: Extraocular movements intact. Conjunctiva/sclera: Conjunctivae normal.      Pupils: Pupils are equal, round, and reactive to light. Neck:      Musculoskeletal: Normal range of motion and neck supple. Cardiovascular:      Rate and Rhythm: Normal rate and regular rhythm. Pulses: Normal pulses. Heart sounds: Normal heart sounds. Pulmonary:      Effort: Pulmonary effort is normal.      Breath sounds: Normal breath sounds. Skin:     General: Skin is warm and dry. Neurological:      General: No focal deficit present. Mental Status: She is alert and oriented to person, place, and time. Motor: No weakness. Coordination: Coordination normal.      Gait: Gait normal.   Psychiatric:         Mood and Affect: Mood normal.         Behavior: Behavior normal.         Thought Content: Thought content normal.         Judgment: Judgment normal.       12 lead EKG - Sinus  Rhythm   -  Negative precordial T-waves. Low voltage with rightward P-axis and rotation -possible pulmonary disease.    ABNORMAL       Status: Final result (Exam End: 2/22/2021 09:22) Provider Status: Open   Study Result    INDICATION: Follow-up abnormal radiograph     COMPARISON: Chest radiographs of 1/5/2021 and 1/27/2021, CT chest of 7/20/2013  and CT abdomen 3/28/2018     CONTRAST: None.     TECHNIQUE:  5 mm axial images were obtained through the chest. 1 mm reformatted  images were also performed. Coronal and sagittal reconstructions were generated. The absence of intravenous contrast reduces the sensitivity for evaluation of  the mediastinum and upper abdominal organs. CT dose reduction was achieved  through use of a standardized protocol tailored for this examination and  automatic exposure control for dose modulation.     FINDINGS:     THYROID: No nodule. MEDIASTINUM: No mass or lymphadenopathy. BENSON: No mass or lymphadenopathy. THORACIC AORTA: No aneurysm. MAIN PULMONARY ARTERY: Normal in caliber. TRACHEA/BRONCHI: Patent. ESOPHAGUS: No wall thickening or dilatation. HEART: Normal in size. PLEURA: No effusion or pneumothorax. LUNGS:      The lungs demonstrate mild bilateral peripherally located subpleural areas of  groundglass opacity most pronounced in the mid lower lung zones. This is in the  same distribution as the pneumonia identified on examination of 1/5/2021 and is  likely postinflammatory change. No distinct pulmonary nodules are noted. Central  airways are patent. .  INCIDENTALLY IMAGED UPPER ABDOMEN: The gallbladder has been removed. There is  hepatic steatosis. Right kidney has been removed and partially imaged is areas  of fat necrosis in the postoperative bed. There is a suggestion of a small  hiatal hernia. Rawleigh Billing BONES: No destructive bone lesion.     IMPRESSION     1. The lungs demonstrate no distinct pulmonary nodules. There are mild residual  subpleural parenchymal opacities mostly groundglass in character. This is  consistent with postinflammatory change.  This is in the same distribution as the  pneumonia identified on the examination of 1/5/2021       ASSESSMENT and PLAN    ICD-10-CM ICD-9-CM    1. Palpitations  R00.2 785.1 AMB POC EKG ROUTINE W/ 12 LEADS, INTER & REP   2. Essential hypertension  I10 401.9    3. History of COVID-19  Z86.16       Discussed with patient symptoms may be Covid related but also recognize and discussed with pt that this may be anxiety. She's been on Wellbutrin for one year as she noticed last year some agitation. Wellbutrin has worked well for this. Pt has f/u with PCP scheduled on 3/25 and she was encouraged . If continued palpitations, f/u with Cardiology.       Chris Hennessy NP

## 2021-03-04 NOTE — PROGRESS NOTES
RM 4    Chief Complaint   Patient presents with    Hypertension     Pt has BP concerns - Pt hospitalized in Jan for covid.  Pt has been seen by cardio and all test normal       Visit Vitals  /82 (BP 1 Location: Left arm, BP Patient Position: Sitting)   Pulse 86   Temp 98.3 °F (36.8 °C) (Oral)   Resp 16   Ht 5' 3\" (1.6 m)   Wt 211 lb (95.7 kg)   SpO2 98%   BMI 37.38 kg/m²

## 2021-04-01 ENCOUNTER — TRANSCRIBE ORDER (OUTPATIENT)
Dept: SCHEDULING | Age: 61
End: 2021-04-01

## 2021-04-01 DIAGNOSIS — U07.1 PNEUMONIA DUE TO COVID-19 VIRUS: Primary | ICD-10-CM

## 2021-04-01 DIAGNOSIS — J12.82 PNEUMONIA DUE TO COVID-19 VIRUS: Primary | ICD-10-CM

## 2021-04-11 ENCOUNTER — APPOINTMENT (OUTPATIENT)
Dept: GENERAL RADIOLOGY | Age: 61
End: 2021-04-11
Attending: EMERGENCY MEDICINE
Payer: COMMERCIAL

## 2021-04-11 ENCOUNTER — HOSPITAL ENCOUNTER (EMERGENCY)
Age: 61
Discharge: HOME | End: 2021-04-11
Attending: EMERGENCY MEDICINE
Payer: COMMERCIAL

## 2021-04-11 ENCOUNTER — APPOINTMENT (OUTPATIENT)
Dept: CT IMAGING | Age: 61
End: 2021-04-11
Attending: EMERGENCY MEDICINE
Payer: COMMERCIAL

## 2021-04-11 VITALS
BODY MASS INDEX: 37.23 KG/M2 | HEIGHT: 63 IN | SYSTOLIC BLOOD PRESSURE: 140 MMHG | OXYGEN SATURATION: 99 % | DIASTOLIC BLOOD PRESSURE: 90 MMHG | HEART RATE: 97 BPM | WEIGHT: 210.1 LBS | RESPIRATION RATE: 11 BRPM | TEMPERATURE: 98.6 F

## 2021-04-11 DIAGNOSIS — R10.9 ACUTE ABDOMINAL PAIN: Primary | ICD-10-CM

## 2021-04-11 LAB
ALBUMIN SERPL-MCNC: 4.2 G/DL (ref 3.5–5)
ALBUMIN/GLOB SERPL: 1.3 {RATIO} (ref 1.1–2.2)
ALP SERPL-CCNC: 74 U/L (ref 45–117)
ALT SERPL-CCNC: 38 U/L (ref 12–78)
ANION GAP SERPL CALC-SCNC: 6 MMOL/L (ref 5–15)
AST SERPL-CCNC: 23 U/L (ref 15–37)
BASOPHILS # BLD: 0.1 K/UL (ref 0–0.1)
BASOPHILS NFR BLD: 1 % (ref 0–1)
BILIRUB SERPL-MCNC: 1.1 MG/DL (ref 0.2–1)
BUN SERPL-MCNC: 13 MG/DL (ref 6–20)
BUN/CREAT SERPL: 12 (ref 12–20)
CALCIUM SERPL-MCNC: 9.3 MG/DL (ref 8.5–10.1)
CHLORIDE SERPL-SCNC: 105 MMOL/L (ref 97–108)
CO2 SERPL-SCNC: 27 MMOL/L (ref 21–32)
CREAT SERPL-MCNC: 1.05 MG/DL (ref 0.55–1.02)
DIFFERENTIAL METHOD BLD: NORMAL
EOSINOPHIL # BLD: 0.2 K/UL (ref 0–0.4)
EOSINOPHIL NFR BLD: 2 % (ref 0–7)
ERYTHROCYTE [DISTWIDTH] IN BLOOD BY AUTOMATED COUNT: 13.1 % (ref 11.5–14.5)
GLOBULIN SER CALC-MCNC: 3.2 G/DL (ref 2–4)
GLUCOSE SERPL-MCNC: 88 MG/DL (ref 65–100)
HCT VFR BLD AUTO: 38.6 % (ref 35–47)
HGB BLD-MCNC: 14.1 G/DL (ref 11.5–16)
IMM GRANULOCYTES # BLD AUTO: 0 K/UL (ref 0–0.04)
IMM GRANULOCYTES NFR BLD AUTO: 0 % (ref 0–0.5)
LIPASE SERPL-CCNC: 170 U/L (ref 73–393)
LYMPHOCYTES # BLD: 2.6 K/UL (ref 0.8–3.5)
LYMPHOCYTES NFR BLD: 27 % (ref 12–49)
MCH RBC QN AUTO: 30.8 PG (ref 26–34)
MCHC RBC AUTO-ENTMCNC: 36.5 G/DL (ref 30–36.5)
MCV RBC AUTO: 84.3 FL (ref 80–99)
MONOCYTES # BLD: 0.6 K/UL (ref 0–1)
MONOCYTES NFR BLD: 6 % (ref 5–13)
NEUTS SEG # BLD: 6.2 K/UL (ref 1.8–8)
NEUTS SEG NFR BLD: 64 % (ref 32–75)
NRBC # BLD: 0 K/UL (ref 0–0.01)
NRBC BLD-RTO: 0 PER 100 WBC
PLATELET # BLD AUTO: 302 K/UL (ref 150–400)
PMV BLD AUTO: 9.4 FL (ref 8.9–12.9)
POTASSIUM SERPL-SCNC: 3.3 MMOL/L (ref 3.5–5.1)
PROT SERPL-MCNC: 7.4 G/DL (ref 6.4–8.2)
RBC # BLD AUTO: 4.58 M/UL (ref 3.8–5.2)
SODIUM SERPL-SCNC: 138 MMOL/L (ref 136–145)
TROPONIN I SERPL-MCNC: <0.05 NG/ML
WBC # BLD AUTO: 9.7 K/UL (ref 3.6–11)

## 2021-04-11 PROCEDURE — 93005 ELECTROCARDIOGRAM TRACING: CPT

## 2021-04-11 PROCEDURE — 96375 TX/PRO/DX INJ NEW DRUG ADDON: CPT

## 2021-04-11 PROCEDURE — 71046 X-RAY EXAM CHEST 2 VIEWS: CPT

## 2021-04-11 PROCEDURE — 96376 TX/PRO/DX INJ SAME DRUG ADON: CPT

## 2021-04-11 PROCEDURE — 85025 COMPLETE CBC W/AUTO DIFF WBC: CPT

## 2021-04-11 PROCEDURE — 74011250636 HC RX REV CODE- 250/636: Performed by: EMERGENCY MEDICINE

## 2021-04-11 PROCEDURE — 36415 COLL VENOUS BLD VENIPUNCTURE: CPT

## 2021-04-11 PROCEDURE — 96374 THER/PROPH/DIAG INJ IV PUSH: CPT

## 2021-04-11 PROCEDURE — 99284 EMERGENCY DEPT VISIT MOD MDM: CPT

## 2021-04-11 PROCEDURE — 84484 ASSAY OF TROPONIN QUANT: CPT

## 2021-04-11 PROCEDURE — 83690 ASSAY OF LIPASE: CPT

## 2021-04-11 PROCEDURE — 80053 COMPREHEN METABOLIC PANEL: CPT

## 2021-04-11 PROCEDURE — 74176 CT ABD & PELVIS W/O CONTRAST: CPT

## 2021-04-11 RX ORDER — ONDANSETRON 2 MG/ML
4 INJECTION INTRAMUSCULAR; INTRAVENOUS
Status: COMPLETED | OUTPATIENT
Start: 2021-04-11 | End: 2021-04-11

## 2021-04-11 RX ORDER — OXYCODONE AND ACETAMINOPHEN 5; 325 MG/1; MG/1
1 TABLET ORAL
Qty: 10 TAB | Refills: 0 | Status: SHIPPED | OUTPATIENT
Start: 2021-04-11 | End: 2021-04-14

## 2021-04-11 RX ORDER — FENTANYL CITRATE 50 UG/ML
25 INJECTION, SOLUTION INTRAMUSCULAR; INTRAVENOUS
Status: COMPLETED | OUTPATIENT
Start: 2021-04-11 | End: 2021-04-11

## 2021-04-11 RX ORDER — OMEPRAZOLE 20 MG/1
20 CAPSULE, DELAYED RELEASE ORAL DAILY
Qty: 20 CAP | Refills: 0 | Status: SHIPPED | OUTPATIENT
Start: 2021-04-11 | End: 2021-04-19 | Stop reason: ALTCHOICE

## 2021-04-11 RX ORDER — DICYCLOMINE HYDROCHLORIDE 20 MG/1
20 TABLET ORAL
Qty: 20 TAB | Refills: 0 | Status: SHIPPED | OUTPATIENT
Start: 2021-04-11 | End: 2021-04-19 | Stop reason: ALTCHOICE

## 2021-04-11 RX ORDER — MORPHINE SULFATE 2 MG/ML
4 INJECTION, SOLUTION INTRAMUSCULAR; INTRAVENOUS
Status: COMPLETED | OUTPATIENT
Start: 2021-04-11 | End: 2021-04-11

## 2021-04-11 RX ADMIN — FENTANYL CITRATE 25 MCG: 50 INJECTION, SOLUTION INTRAMUSCULAR; INTRAVENOUS at 19:40

## 2021-04-11 RX ADMIN — SODIUM CHLORIDE 500 ML: 9 INJECTION, SOLUTION INTRAVENOUS at 19:49

## 2021-04-11 RX ADMIN — MORPHINE SULFATE 4 MG: 2 INJECTION, SOLUTION INTRAMUSCULAR; INTRAVENOUS at 21:32

## 2021-04-11 RX ADMIN — ONDANSETRON 4 MG: 2 INJECTION INTRAMUSCULAR; INTRAVENOUS at 19:40

## 2021-04-11 RX ADMIN — ONDANSETRON 4 MG: 2 INJECTION INTRAMUSCULAR; INTRAVENOUS at 21:32

## 2021-04-11 NOTE — ED NOTES
Pt continues to ask where the doctor is stating, \"We've been waiting for 2 hours and I want to leave. \" Dr. Moe Boudreaux notified of pt wanting to leave AMA.

## 2021-04-11 NOTE — LETTER
[address] 
[facility] EMERGENCY DEPT 
[address] [place] [address] Daryel Runner [id number] [phone] Work/School Note Date: 4/11/2021 To Whom It May concern: 
 
Suraj Mak was seen and treated today in the emergency room by the following provider(s): 
Attending Provider: Julio Waggoner MD. Suraj Mak may return to work on 4/14/2021. Sincerely, Sofia Cooley MD

## 2021-04-12 LAB
ATRIAL RATE: 105 BPM
CALCULATED P AXIS, ECG09: 52 DEGREES
CALCULATED R AXIS, ECG10: 14 DEGREES
CALCULATED T AXIS, ECG11: 59 DEGREES
DIAGNOSIS, 93000: NORMAL
P-R INTERVAL, ECG05: 180 MS
Q-T INTERVAL, ECG07: 344 MS
QRS DURATION, ECG06: 86 MS
QTC CALCULATION (BEZET), ECG08: 454 MS
VENTRICULAR RATE, ECG03: 105 BPM

## 2021-04-12 NOTE — ED NOTES
Dr. Soo Huang reviewed discharge instructions with the patient. The patient verbalized understanding. All questions and concerns were addressed. The patient declined a wheelchair and is discharged ambulatory in the care of family members with instructions and prescriptions in hand. Pt is alert and oriented x 4. Respirations are clear and unlabored.

## 2021-04-12 NOTE — ED PROVIDER NOTES
EMERGENCY DEPARTMENT HISTORY AND PHYSICAL EXAM      Date: 4/11/2021  Patient Name: Stevie Don    History of Presenting Illness     Chief Complaint   Patient presents with    Epigastric Pain     Ambulatory into the ED with c/o epigastric pain/CP intermittently since having Covid in January - worse since last night.  Chest Pain       History Provided By: Patient    HPI: Stevie Don, 64 y.o. female presents to the ED with cc of epigastric pain patient states that she has been out of work for 12 weeks since she was diagnosed with COVID-19. She denies chest pain, cough, fever or shortness of breath. She is here for epigastric pain which has been constant since last night. It was a 10 out of 10 but is currently 8 out of 10 in severity. She is worried that she may have diverticulitis. She denies nausea, vomiting, dysuria or change in bowel habits. She did not take anything for pain prior to arrival.    There are no other complaints, changes, or physical findings at this time. PCP: Miguel Ángel Christie MD    No current facility-administered medications on file prior to encounter. Current Outpatient Medications on File Prior to Encounter   Medication Sig Dispense Refill    hydroCHLOROthiazide (HYDRODIURIL) 25 mg tablet Take 25 mg by mouth daily.  ketoconazole (NIZORAL) 2 % topical cream APPLY TO THE AFFECTED AREA TWICE DAILY FOR 1 MONTH      famotidine (Pepcid) 20 mg tablet Pepcid 20 mg tablet   Take 2 tablets twice a day by oral route.  ascorbic acid, vitamin C, (VITAMIN C) 500 mg tablet Take 1 Tab by mouth two (2) times a day. 10 Tab 0    zinc sulfate (ZINCATE) 220 (50) mg capsule Take 1 Cap by mouth two (2) times a day. 5 Cap 0    lubiPROStone (Amitiza) 8 mcg capsule Take 8 mcg by mouth two (2) times daily (with meals).  bran/gum/fib/celina/psyl/kelp/pec (FIBER 6 PO) fiber      buPROPion SR (WELLBUTRIN SR) 150 mg SR tablet Take 150 mg by mouth daily.       B.infantis-B.ani-B.long-B.bifi (PROBIOTIC 4X) 10-15 mg TbEC Take 10-15 mg by mouth daily. Past History     Past Medical History:  Past Medical History:   Diagnosis Date    Chronic kidney disease     Diverticulosis     GERD (gastroesophageal reflux disease)     Hypertension     Renal carcinoma, right ([address]     kidney    Sleep apnea        Past Surgical History:  Past Surgical History:   Procedure Laterality Date    HX CERVICAL DISKECTOMY      HX  SECTION   and     HX CHOLECYSTECTOMY      HX CHOLECYSTECTOMY      HX GYN      ablation    HX GYN  2015    hysterectomy    HX NEPHRECTOMY Right 2018    HX ORTHOPAEDIC      Neck Surgery.  HX ORTHOPAEDIC Left     knee surgery x 3    HX OTHER SURGICAL  2016    colon resection    HX ROTATOR CUFF REPAIR Right 2006    HX TUMOR REMOVAL  2018    R Kidney        Family History:  Family History   Problem Relation Age of Onset    Hypertension Mother     Hypertension Father     Diabetes Sister     Anesth Problems Neg Hx        Social History:  Social History     Tobacco Use    Smoking status: Never Smoker    Smokeless tobacco: Never Used   Substance Use Topics    Alcohol use: Yes     Comment: OCC    Drug use: No       Allergies:  No Known Allergies      Review of Systems   Review of Systems   Constitutional: Negative for fever. HENT: Negative for congestion. Eyes: Negative. Respiratory: Negative for shortness of breath. Cardiovascular: Negative for chest pain. Gastrointestinal: Positive for abdominal pain. Endocrine: Negative for heat intolerance. Genitourinary: Negative. Musculoskeletal: Positive for back pain. Skin: Negative for rash. Allergic/Immunologic: Negative for immunocompromised state. Neurological: Negative for dizziness. Hematological: Does not bruise/bleed easily. Psychiatric/Behavioral: Negative. All other systems reviewed and are negative.       Physical Exam   Physical Exam  Vitals signs and nursing note reviewed. Constitutional:       General: She is not in acute distress. Appearance: She is well-developed. HENT:      Head: Normocephalic. Neck:      Musculoskeletal: Normal range of motion and neck supple. Cardiovascular:      Rate and Rhythm: Normal rate and regular rhythm. Heart sounds: Normal heart sounds. Pulmonary:      Effort: Pulmonary effort is normal.      Breath sounds: Normal breath sounds. Abdominal:      General: Bowel sounds are normal.      Palpations: Abdomen is soft. Tenderness: There is abdominal tenderness. Comments: Upper quadrants tender   Musculoskeletal: Normal range of motion. Skin:     General: Skin is warm and dry. Neurological:      General: No focal deficit present. Mental Status: She is alert and oriented to person, place, and time.    Psychiatric:         Behavior: Behavior normal.         Diagnostic Study Results     Labs -     Recent Results (from the past 12 hour(s))   EKG, 12 LEAD, INITIAL    Collection Time: 04/11/21  5:39 PM   Result Value Ref Range    Ventricular Rate 105 BPM    Atrial Rate 105 BPM    P-R Interval 180 ms    QRS Duration 86 ms    Q-T Interval 344 ms    QTC Calculation (Bezet) 454 ms    Calculated P Axis 52 degrees    Calculated R Axis 14 degrees    Calculated T Axis 59 degrees    Diagnosis       Sinus tachycardia  Possible Left atrial enlargement  Low voltage QRS  Nonspecific ST and T wave abnormality  When compared with ECG of 05-JAN-2021 09:37,  QT has lengthened     CBC WITH AUTOMATED DIFF    Collection Time: 04/11/21  6:09 PM   Result Value Ref Range    WBC 9.7 3.6 - 11.0 K/uL    RBC 4.58 3.80 - 5.20 M/uL    HGB 14.1 11.5 - 16.0 g/dL    HCT 38.6 35.0 - 47.0 %    MCV 84.3 80.0 - 99.0 FL    MCH 30.8 26.0 - 34.0 PG    MCHC 36.5 30.0 - 36.5 g/dL    RDW 13.1 11.5 - 14.5 %    PLATELET 687 684 - 521 K/uL    MPV 9.4 8.9 - 12.9 FL    NRBC 0.0 0  WBC    ABSOLUTE NRBC 0.00 0.00 - 0.01 K/uL NEUTROPHILS 64 32 - 75 %    LYMPHOCYTES 27 12 - 49 %    MONOCYTES 6 5 - 13 %    EOSINOPHILS 2 0 - 7 %    BASOPHILS 1 0 - 1 %    IMMATURE GRANULOCYTES 0 0.0 - 0.5 %    ABS. NEUTROPHILS 6.2 1.8 - 8.0 K/UL    ABS. LYMPHOCYTES 2.6 0.8 - 3.5 K/UL    ABS. MONOCYTES 0.6 0.0 - 1.0 K/UL    ABS. EOSINOPHILS 0.2 0.0 - 0.4 K/UL    ABS. BASOPHILS 0.1 0.0 - 0.1 K/UL    ABS. IMM. GRANS. 0.0 0.00 - 0.04 K/UL    DF AUTOMATED     METABOLIC PANEL, COMPREHENSIVE    Collection Time: 04/11/21  6:09 PM   Result Value Ref Range    Sodium 138 136 - 145 mmol/L    Potassium 3.3 (L) 3.5 - 5.1 mmol/L    Chloride 105 97 - 108 mmol/L    CO2 27 21 - 32 mmol/L    Anion gap 6 5 - 15 mmol/L    Glucose 88 65 - 100 mg/dL    BUN 13 6 - 20 MG/DL    Creatinine 1.05 (H) 0.55 - 1.02 MG/DL    BUN/Creatinine ratio 12 12 - 20      GFR est AA >60 >60 ml/min/1.73m2    GFR est non-AA 54 (L) >60 ml/min/1.73m2    Calcium 9.3 8.5 - 10.1 MG/DL    Bilirubin, total 1.1 (H) 0.2 - 1.0 MG/DL    ALT (SGPT) 38 12 - 78 U/L    AST (SGOT) 23 15 - 37 U/L    Alk. phosphatase 74 45 - 117 U/L    Protein, total 7.4 6.4 - 8.2 g/dL    Albumin 4.2 3.5 - 5.0 g/dL    Globulin 3.2 2.0 - 4.0 g/dL    A-G Ratio 1.3 1.1 - 2.2     TROPONIN I    Collection Time: 04/11/21  6:09 PM   Result Value Ref Range    Troponin-I, Qt. <0.05 <0.05 ng/mL   LIPASE    Collection Time: 04/11/21  6:09 PM   Result Value Ref Range    Lipase 170 73 - 393 U/L       Radiologic Studies -   CT ABD PELV WO CONT   Final Result   Status post right nephrectomy, cholecystectomy, and hysterectomy. Hepatic   steatosis. Small ventral hernia containing small bowel without evidence of   obstruction. Diffuse colonic diverticulosis. No acute abnormality is identified. XR CHEST PA LAT   Final Result    Bibasilar patchy lung infiltrates, new since the prior study. . Correlate for   infection. .           CT Results  (Last 48 hours)               04/11/21 2034  CT ABD PELV WO CONT Final result    Impression:  Status post right nephrectomy, cholecystectomy, and hysterectomy. Hepatic   steatosis. Small ventral hernia containing small bowel without evidence of   obstruction. Diffuse colonic diverticulosis. No acute abnormality is identified. Narrative:  EXAM: CT ABD PELV WO CONT       INDICATION: Abdominal pain, history of diverticulitis, only has 1 kidney       COMPARISON: 3/28/2018       CONTRAST:  None. TECHNIQUE:    Thin axial images were obtained through the abdomen and pelvis. Coronal and   sagittal reformats were generated. Oral contrast was not administered. CT dose   reduction was achieved through use of a standardized protocol tailored for this   examination and automatic exposure control for dose modulation. The absence of intravenous contrast material reduces the sensitivity for   evaluation of the vasculature and solid organs. FINDINGS:    LOWER THORAX: No significant abnormality in the incidentally imaged lower chest.   LIVER: Hepatic steatosis. BILIARY TREE: Status post cholecystectomy. CBD is not dilated. SPLEEN: within normal limits. PANCREAS: No focal abnormality. ADRENALS: Unremarkable. KIDNEYS/URETERS: Status post right nephrectomy. Dystrophic calcification noted   in the nephrectomy bed. Punctate nonobstructing left renal stone. No ureteral   stone or hydronephrosis. STOMACH: Unremarkable. SMALL BOWEL: No dilatation or wall thickening. COLON: Diffuse colonic diverticulosis. APPENDIX: Within normal limits. PERITONEUM: No ascites or pneumoperitoneum. RETROPERITONEUM: No lymphadenopathy or aortic aneurysm. REPRODUCTIVE ORGANS: The uterus is surgically absent. URINARY BLADDER: No mass or calculus. BONES: Degenerative changes are seen in the lumbar spine. ABDOMINAL WALL: Small ventral hernia containing small bowel without evidence of   obstruction.    ADDITIONAL COMMENTS: N/A               CXR Results  (Last 48 hours)               04/11/21 5676  XR CHEST PA LAT Final result    Impression:   Bibasilar patchy lung infiltrates, new since the prior study. . Correlate for   infection. .           Narrative:  INDICATION:  CP. EXAM: 2 VIEW CHEST RADIOGRAPH. COMPARISON: 1/27/2021. FINDINGS: Frontal and lateral views of the chest show bibasilar lung   infiltrates, new since the prior study. . . The heart, mediastinum and pulmonary   vasculature are stable. The bony thorax is unremarkable for age. ..                 Medical Decision Making   I am the first provider for this patient. I reviewed the vital signs, available nursing notes, past medical history, past surgical history, family history and social history. Vital Signs-Reviewed the patient's vital signs. Patient Vitals for the past 12 hrs:   Temp Pulse Resp BP SpO2   04/11/21 1900  97 11 (!) 140/90 99 %   04/11/21 1800  (!) 108 12  99 %   04/11/21 1750 98.6 °F (37 °C)   (!) 148/99    04/11/21 1737 98.2 °F (36.8 °C) (!) 106 13 (!) 153/96 98 %       5}. Records Reviewed: Nursing Notes and Old Medical Records    Provider Notes (Medical Decision Making):   Diverticulitis, pancreatitis, gastritis, reflux    ED Course:   Initial assessment performed. The patients presenting problems have been discussed, and they are in agreement with the care plan formulated and outlined with them. I have encouraged them to ask questions as they arise throughout their visit. Progress note: The patient is feeling better. Her results were reviewed. She is advised to follow-up and return to ER if worse             Critical Care Time:   0    Disposition:  home    DISCHARGE PLAN:  1.    Discharge Medication List as of 4/11/2021  9:40 PM      START taking these medications    Details   dicyclomine (BENTYL) 20 mg tablet Take 1 Tab by mouth every six (6) hours as needed for Abdominal Cramps., Normal, Disp-20 Tab, R-0      oxyCODONE-acetaminophen (Percocet) 5-325 mg per tablet Take 1 Tab by mouth every eight (8) hours as needed for Pain for up to 3 days. Max Daily Amount: 3 Tabs. Indications: pain, Normal, Disp-10 Tab, R-0      omeprazole (PRILOSEC) 20 mg capsule Take 1 Cap by mouth daily. , Normal, Disp-20 Cap, R-0         CONTINUE these medications which have NOT CHANGED    Details   hydroCHLOROthiazide (HYDRODIURIL) 25 mg tablet Take 25 mg by mouth daily. , Historical Med      ketoconazole (NIZORAL) 2 % topical cream APPLY TO THE AFFECTED AREA TWICE DAILY FOR 1 MONTH, Historical Med      ascorbic acid, vitamin C, (VITAMIN C) 500 mg tablet Take 1 Tab by mouth two (2) times a day., Normal, Disp-10 Tab, R-0      zinc sulfate (ZINCATE) 220 (50) mg capsule Take 1 Cap by mouth two (2) times a day., Normal, Disp-5 Cap, R-0      lubiPROStone (Amitiza) 8 mcg capsule Take 8 mcg by mouth two (2) times daily (with meals). , Historical Med      bran/gum/fib/celina/psyl/kelp/pec (FIBER 6 PO) fiber, Historical Med      buPROPion SR (WELLBUTRIN SR) 150 mg SR tablet Take 150 mg by mouth daily. , Historical Med      B.infantis-B.ani-B.long-B.bifi (PROBIOTIC 4X) 10-15 mg TbEC Take 10-15 mg by mouth daily. , Historical Med         STOP taking these medications       famotidine (Pepcid) 20 mg tablet Comments:   Reason for Stoppin.   Follow-up Information     Follow up With Specialties Details Why Contact Info    Lauri Porter MD Family Medicine  As needed [address]  [address] [address] [phone]        In 2 days As needed Your GI doctor    [facility] EMERGENCY DEPT Emergency Medicine  If symptoms worsen [address]  [address]  [phone]        3. Return to ED if worse     Diagnosis     Clinical Impression:   1. Acute abdominal pain        Attestations:    Myranda Springer MD    Please note that this dictation was completed with [employer], the [employer] voice recognition software.   Quite often unanticipated grammatical, syntax, homophones, and other interpretive errors are inadvertently transcribed by the computer software. Please disregard these errors. Please excuse any errors that have escaped final proofreading. Thank you.

## 2021-04-19 ENCOUNTER — OFFICE VISIT (OUTPATIENT)
Dept: PRIMARY CARE CLINIC | Age: 61
End: 2021-04-19

## 2021-04-19 VITALS
SYSTOLIC BLOOD PRESSURE: 132 MMHG | WEIGHT: 211.4 LBS | OXYGEN SATURATION: 97 % | BODY MASS INDEX: 37.46 KG/M2 | TEMPERATURE: 98.4 F | HEART RATE: 91 BPM | RESPIRATION RATE: 17 BRPM | HEIGHT: 63 IN | DIASTOLIC BLOOD PRESSURE: 84 MMHG

## 2021-04-19 DIAGNOSIS — L03.116 CELLULITIS OF LEFT LOWER EXTREMITY: Primary | ICD-10-CM

## 2021-04-19 DIAGNOSIS — R22.42 LOCALIZED SWELLING OF LEFT FOOT: ICD-10-CM

## 2021-04-19 PROCEDURE — 99213 OFFICE O/P EST LOW 20 MIN: CPT | Performed by: NURSE PRACTITIONER

## 2021-04-19 RX ORDER — BRAN/GUM/FIB/CEL/PSYL/KELP/PEC 1000 MG
TABLET ORAL
COMMUNITY

## 2021-04-19 RX ORDER — CHOLECALCIFEROL (VITAMIN D3) 50 MCG
CAPSULE ORAL
COMMUNITY
End: 2021-04-19 | Stop reason: SDUPTHER

## 2021-04-19 RX ORDER — PREDNISONE 5 MG/1
TABLET ORAL
COMMUNITY
Start: 2021-03-18 | End: 2021-04-19 | Stop reason: ALTCHOICE

## 2021-04-19 RX ORDER — FAMOTIDINE 40 MG/1
1 TABLET, FILM COATED ORAL
COMMUNITY

## 2021-04-19 RX ORDER — CHOLECALCIFEROL TAB 125 MCG (5000 UNIT) 125 MCG
TAB ORAL
COMMUNITY
End: 2021-11-22

## 2021-04-19 RX ORDER — CEPHALEXIN 500 MG/1
500 CAPSULE ORAL 4 TIMES DAILY
Qty: 28 CAP | Refills: 0 | Status: SHIPPED | OUTPATIENT
Start: 2021-04-19 | End: 2021-04-26

## 2021-04-19 RX ORDER — BUPROPION HYDROCHLORIDE 150 MG/1
1 TABLET ORAL
COMMUNITY

## 2021-04-19 RX ORDER — UREA 10 %
LOTION (ML) TOPICAL
COMMUNITY
End: 2021-04-19 | Stop reason: ALTCHOICE

## 2021-04-19 RX ORDER — LOPERAMIDE HCL 2 MG
2 TABLET ORAL
COMMUNITY
End: 2021-04-19 | Stop reason: ALTCHOICE

## 2021-04-19 RX ORDER — ASCORBIC ACID 500 MG
TABLET ORAL
COMMUNITY
End: 2021-04-19 | Stop reason: ALTCHOICE

## 2021-04-19 NOTE — PATIENT INSTRUCTIONS

## 2021-04-19 NOTE — PROGRESS NOTES
HISTORY OF PRESENT ILLNESS  Alejandro Nguyen is a 64 y.o. female. Patient presents with non traumatic   Left foot pain on top, swelling and redness. Started 3-4 days ago. Not painful walking. Occasional tingling of toes. Does have a superficial linear laceration lower leg that is well healed. \"from a dirty shovel. \" Able to walk . No pain in joints. Does have a virtual visit with PCP tomorrow. Reviewed recent lab results with patient. As of note, Pt was seen in ER 21 for GI complaints. No change in medication. No change in diet/ excess fluids or increase sodium. Elevated blood pressure        Review of Systems   Constitutional: Negative for chills and malaise/fatigue. Gastrointestinal: Negative for abdominal pain. Musculoskeletal: Negative for falls, joint pain and myalgias. Skin: Negative for itching and rash. Neurological: Negative for weakness and headaches. Endo/Heme/Allergies: Does not bruise/bleed easily. Past Medical History:   Diagnosis Date    Chronic kidney disease     Diverticulosis     GERD (gastroesophageal reflux disease)     Hypertension     Renal carcinoma, [address] ([facility] [address]     kidney    Sleep apnea      Past Surgical History:   Procedure Laterality Date    HX CERVICAL DISKECTOMY      HX  SECTION   and     HX CHOLECYSTECTOMY      HX CHOLECYSTECTOMY      HX GYN      ablation    HX GYN  2015    hysterectomy    HX NEPHRECTOMY Right 2018    HX ORTHOPAEDIC      Neck Surgery.  HX ORTHOPAEDIC Left     knee surgery x 3    HX OTHER SURGICAL  2016    colon resection    HX ROTATOR CUFF REPAIR Right     HX TUMOR REMOVAL  2018    R Kidney        Physical Exam  Vitals signs and nursing note reviewed. Constitutional:       General: She is not in acute distress. Appearance: Normal appearance. She is obese. HENT:      Head: Normocephalic and atraumatic. Eyes:      Pupils: Pupils are equal, round, and reactive to light. Cardiovascular:      Rate and Rhythm: Normal rate. Pulses:           Dorsalis pedis pulses are 1+ on the left side. Posterior tibial pulses are 2+ on the left side. Pulmonary:      Effort: Pulmonary effort is normal.   Musculoskeletal:      Left foot: Normal range of motion. No deformity. Feet:    Feet:      Left foot:      Protective Sensation: 9 sites sensed. Skin integrity: Erythema and warmth present. No skin breakdown. Toenail Condition: Left toenails are normal.      Comments: Left foot/ tenderness anterior to ankle with  Trace edema and erythema. No pain over metatarsals. FROM . Skin intact. 2 cm vertical linear abrasion well healed with eschar. No edema, erythema or streaking. Neurological:      General: No focal deficit present. Mental Status: She is alert. Psychiatric:         Mood and Affect: Mood normal.     Blood pressure repeated manually 132/84  ASSESSMENT and PLAN    ICD-10-CM ICD-9-CM    1. Cellulitis of left lower extremity  L03.116 682.6    2. Localized swelling of left foot  R22.42 782.2      Encounter Diagnoses   Name Primary?  Cellulitis of left lower extremity Yes    Localized swelling of left foot      Orders Placed This Encounter    cephALEXin (KEFLEX) 500 mg capsule     Sig: Take 1 Cap by mouth four (4) times daily for 7 days. Dispense:  28 Cap     Refill:  0     Medication as directed  Continue to ice, elevate and NSAIDs. Discuss with PCP  At visit tomorrow. It was a pleasure to see you in the office today. Please call if you have any further questions or concerns. I am available through the portal system.      Signed By: PEARL Ewing     April 19, 2021

## 2021-04-19 NOTE — PROGRESS NOTES
Adam Sexton is a 64 y.o. female    Room:5    Chief Complaint   Patient presents with    Foot Injury     Pt c/o swollen left foot. Pt States \" my foot is swolled all up, started really swelling saturday noticed friday, dull pain and it just feels tight and a little bit of tingling, has tried ice and putting it up\". Visit Vitals  BP (!) 144/99 (BP 1 Location: Left arm, BP Patient Position: Sitting, BP Cuff Size: Adult)   Pulse 91   Temp 98.4 °F (36.9 °C) (Oral)   Resp 17   Ht 5' 3\" (1.6 m)   Wt 211 lb 6.4 oz (95.9 kg)   SpO2 97%   BMI 37.45 kg/m²       Pain Scale: 6/10    1. Have you been to the ER, urgent care clinic since your last visit? Hospitalized since your last visit? [facility] er on the 04/11/2021    2. Have you seen or consulted any other health care providers outside of the [address] [place] [address] since your last visit? Include any pap smears or colon screening.  no

## 2021-11-02 ENCOUNTER — TRANSCRIBE ORDER (OUTPATIENT)
Dept: REGISTRATION | Age: 61
End: 2021-11-02

## 2021-11-02 ENCOUNTER — HOSPITAL ENCOUNTER (OUTPATIENT)
Dept: GENERAL RADIOLOGY | Age: 61
Discharge: HOME | End: 2021-11-02
Payer: COMMERCIAL

## 2021-11-02 DIAGNOSIS — U07.1 CLINICAL DIAGNOSIS OF SEVERE ACUTE RESPIRATORY SYNDROME CORONAVIRUS 2 (SARS-COV-2) DISEASE: Primary | ICD-10-CM

## 2021-11-02 DIAGNOSIS — U07.1 CLINICAL DIAGNOSIS OF SEVERE ACUTE RESPIRATORY SYNDROME CORONAVIRUS 2 (SARS-COV-2) DISEASE: ICD-10-CM

## 2021-11-02 PROCEDURE — 71046 X-RAY EXAM CHEST 2 VIEWS: CPT

## 2021-11-11 ENCOUNTER — TRANSCRIBE ORDER (OUTPATIENT)
Dept: SCHEDULING | Age: 61
End: 2021-11-11

## 2021-11-11 DIAGNOSIS — R14.0 BLOATING: Primary | ICD-10-CM

## 2021-11-11 DIAGNOSIS — B37.81 CANDIDA ESOPHAGITIS (HCC): ICD-10-CM

## 2021-11-11 DIAGNOSIS — D12.5 BENIGN NEOPLASM OF SIGMOID COLON: ICD-10-CM

## 2021-11-12 ENCOUNTER — TRANSCRIBE ORDER (OUTPATIENT)
Dept: SCHEDULING | Age: 61
End: 2021-11-12

## 2021-11-12 DIAGNOSIS — R14.0 GASTRIC TYMPANY: Primary | ICD-10-CM

## 2021-11-12 DIAGNOSIS — R10.84 ABDOMINAL PAIN, GENERALIZED: ICD-10-CM

## 2021-11-12 DIAGNOSIS — R19.7 DIARRHEA OF PRESUMED INFECTIOUS ORIGIN: ICD-10-CM

## 2021-11-12 DIAGNOSIS — K57.30 DIVERTICULOSIS OF LARGE INTESTINE WITHOUT DIVERTICULITIS: ICD-10-CM

## 2021-11-12 DIAGNOSIS — D12.5 BENIGN NEOPLASM OF SIGMOID COLON: ICD-10-CM

## 2021-11-16 ENCOUNTER — HOSPITAL ENCOUNTER (OUTPATIENT)
Dept: CT IMAGING | Age: 61
Discharge: HOME | End: 2021-11-16
Payer: COMMERCIAL

## 2021-11-16 DIAGNOSIS — R10.84 ABDOMINAL PAIN, GENERALIZED: ICD-10-CM

## 2021-11-16 DIAGNOSIS — K57.30 DIVERTICULOSIS OF LARGE INTESTINE WITHOUT DIVERTICULITIS: ICD-10-CM

## 2021-11-16 DIAGNOSIS — D12.5 BENIGN NEOPLASM OF SIGMOID COLON: ICD-10-CM

## 2021-11-16 DIAGNOSIS — R14.0 GASTRIC TYMPANY: ICD-10-CM

## 2021-11-16 DIAGNOSIS — R19.7 DIARRHEA OF PRESUMED INFECTIOUS ORIGIN: ICD-10-CM

## 2021-11-16 PROCEDURE — 74176 CT ABD & PELVIS W/O CONTRAST: CPT | Performed by: SPECIALIST

## 2021-11-16 RX ORDER — BARIUM SULFATE 20 MG/ML
900 SUSPENSION ORAL
Status: COMPLETED | OUTPATIENT
Start: 2021-11-16 | End: 2021-11-16

## 2021-11-16 RX ADMIN — BARIUM SULFATE 450 ML: 20 SUSPENSION ORAL at 08:13

## 2021-11-22 ENCOUNTER — OFFICE VISIT (OUTPATIENT)
Dept: SURGERY | Age: 61
End: 2021-11-22
Payer: COMMERCIAL

## 2021-11-22 VITALS
BODY MASS INDEX: 36.86 KG/M2 | DIASTOLIC BLOOD PRESSURE: 80 MMHG | TEMPERATURE: 97.3 F | RESPIRATION RATE: 16 BRPM | WEIGHT: 208 LBS | OXYGEN SATURATION: 98 % | HEIGHT: 63 IN | SYSTOLIC BLOOD PRESSURE: 124 MMHG | HEART RATE: 80 BPM

## 2021-11-22 DIAGNOSIS — E66.01 SEVERE OBESITY WITH BODY MASS INDEX (BMI) OF 35.0 TO 39.9 WITH SERIOUS COMORBIDITY (HCC): ICD-10-CM

## 2021-11-22 DIAGNOSIS — K43.2 INCISIONAL HERNIA, WITHOUT OBSTRUCTION OR GANGRENE: Primary | ICD-10-CM

## 2021-11-22 PROCEDURE — 99204 OFFICE O/P NEW MOD 45 MIN: CPT | Performed by: SURGERY

## 2021-11-22 NOTE — PROGRESS NOTES
HISTORY OF PRESENT ILLNESS  Javier Lechuga is a 63 y.o. female who comes in for consultation by Pepe Castillo and Dr Claire Mazariegos for abdominal pain and a hernia  Abdominal Pain  Associated symptoms include abdominal pain. Pertinent negatives include no chest pain, no headaches and no shortness of breath. Possible Hernia  Associated symptoms include abdominal pain. Pertinent negatives include no chest pain, no headaches and no shortness of breath. She has been having intermittent epigastric/suprumbilical pain radiating to both sides and RUQ for several years that is getting more bothersome. CT was negative except a hernia. She has had a hx recurrent diverticulitis culminating in a sigmoid colectomy in 2016,  She also had a right nephrectomy and a cholecystectomy. She reports some intermittent nausea and diarrhea/constipation and bloating as well but denies vomiting, melena, hematochezia, dysuria or hematuria. Past Medical History:   Diagnosis Date    Chronic kidney disease     Diverticulosis     GERD (gastroesophageal reflux disease)     Hypertension     Renal carcinoma, right ([address]     kidney    Sleep apnea      Past Surgical History:   Procedure Laterality Date    HX CERVICAL DISKECTOMY      HX  SECTION   and     HX CHOLECYSTECTOMY      HX CHOLECYSTECTOMY      HX GYN      ablation    HX GYN  2015    hysterectomy    HX NEPHRECTOMY Right 2018    HX ORTHOPAEDIC      Neck Surgery.      HX ORTHOPAEDIC Left     knee surgery x 3    HX OTHER SURGICAL  2016    colon resection    HX ROTATOR CUFF REPAIR Right 2006    HX TUMOR REMOVAL  2018    R Kidney      Family History   Problem Relation Age of Onset    Hypertension Mother     Hypertension Father     Diabetes Sister     Anesth Problems Neg Hx      Social History     Tobacco Use    Smoking status: Never Smoker    Smokeless tobacco: Never Used   Substance Use Topics    Alcohol use: Yes     Comment: OCC    Drug use: No     Current Outpatient Medications   Medication Sig    famotidine (PEPCID) 40 mg tablet Take 1 Tab by mouth.  bran-gum-fib-celina-psyl-kelp-pec (Fiber 6) 1,000 mg tab as directed    buPROPion XL (WELLBUTRIN XL) 150 mg tablet Take 1 Tab by mouth.  hydroCHLOROthiazide (HYDRODIURIL) 25 mg tablet Take 25 mg by mouth daily.  B.infantis-B.ani-B.long-B.bifi (PROBIOTIC 4X) 10-15 mg TbEC Take 10-15 mg by mouth daily.  cholecalciferol (VITAMIN D3) (5000 Units/125 mcg) tab tablet Take  by mouth. No current facility-administered medications for this visit. No Known Allergies    Review of Systems   Constitutional: Negative for chills, diaphoresis, fever, malaise/fatigue and weight loss. HENT: Negative for congestion, ear pain and sore throat. Eyes: Negative for blurred vision and pain. Respiratory: Negative for cough, hemoptysis, sputum production, shortness of breath, wheezing and stridor. Cardiovascular: Negative for chest pain, palpitations, orthopnea, claudication, leg swelling and PND. Gastrointestinal: Positive for abdominal pain, constipation, diarrhea and nausea. Negative for blood in stool, heartburn, melena and vomiting. Genitourinary: Negative for dysuria, flank pain, frequency, hematuria and urgency. Musculoskeletal: Positive for back pain. Negative for joint pain, myalgias and neck pain. Skin: Negative for itching and rash. Neurological: Negative for dizziness, tremors, focal weakness, seizures, weakness and headaches. Endo/Heme/Allergies: Negative for polydipsia. Psychiatric/Behavioral: Positive for depression. Negative for memory loss. The patient is not nervous/anxious.       Visit Vitals  /80 (BP 1 Location: Left upper arm, BP Patient Position: Sitting, BP Cuff Size: Adult)   Pulse 80   Temp 97.3 °F (36.3 °C) (Temporal)   Resp 16   Ht 5' 3\" (1.6 m)   Wt 94.3 kg (208 lb)   LMP 08/26/2014   SpO2 98%   BMI 36.85 kg/m²       Physical Exam  Constitutional: General: She is not in acute distress. Appearance: She is well-developed. She is not diaphoretic. HENT:      Head: Normocephalic and atraumatic. Mouth/Throat:      Pharynx: No oropharyngeal exudate. Eyes:      General: No scleral icterus. Conjunctiva/sclera: Conjunctivae normal.      Pupils: Pupils are equal, round, and reactive to light. Neck:      Thyroid: No thyromegaly. Vascular: No JVD. Trachea: No tracheal deviation. Cardiovascular:      Rate and Rhythm: Normal rate and regular rhythm. Heart sounds: No murmur heard. No friction rub. No gallop. Pulmonary:      Effort: Pulmonary effort is normal. No respiratory distress. Breath sounds: Normal breath sounds. No wheezing or rales. Abdominal:      General: Bowel sounds are normal. There is no distension. Palpations: Abdomen is soft. Abdomen is not rigid. There is no mass. Tenderness: There is no abdominal tenderness. There is no guarding or rebound. Negative signs include Peralta's sign and McBurney's sign. Hernia: A hernia (reducible incisional hernia) is present. There is no hernia in the ventral area. Musculoskeletal:         General: Normal range of motion. Cervical back: Normal range of motion and neck supple. Lymphadenopathy:      Cervical: No cervical adenopathy. Skin:     General: Skin is warm and dry. Coloration: Skin is not pale. Findings: No erythema or rash. Neurological:      Mental Status: She is alert and oriented to person, place, and time. Cranial Nerves: No cranial nerve deficit. Psychiatric:         Behavior: Behavior normal.         Thought Content: Thought content normal.         Judgment: Judgment normal.       I reviewed her CT images today    ASSESSMENT and PLAN  1. Incisional hernia. I explained about the anatomy and pathophysiology of hernias and the risk of incarceration and strangulation of the bowel.   I explained about hernia repairs (open with and without mesh, and robotic assisted and laparoscopic with mesh). I explained the risks and benefits of repair including bleeding, infection, chronic pain, bowel or bladder injury, hernia recurrence, seroma, mesh infection requiring removal.  I explained it would be a six to eight week recuperation with no driving for 5 - 7 days, no lifting for six weeks. 2.  Abdominal pain ? ?secondary to #1 vs scar tissue    3. Obesity Body mass index is 36.85 kg/m². recommended weight loss with diet modification and exercise  4.   Depression stable on rx    She is interested in pursuing a robotic assisted incisional hernia repair with mesh and possible lysis of adhesions under general anesthesia as an outpatient with possible overnight stay   She will likely miss 2-3 weeks from work and could go back to light duty      Natacha Corley MD FACS

## 2021-11-22 NOTE — LETTER
11/22/2021    Patient: Tr Peters   YOB: 1960   Date of Visit: 11/22/2021     Stoney Hernandez, [address]  [address] [address]  Via Fax: [phone]     Rosa Romero, [address] [place] [address] [address] [address]  Via Fax: [phone]    Dear MD Rosa Michelle MD,      Thank you for referring Ms. Guerita Sue to [address] for evaluation. My notes for this consultation are attached. If you have questions, please do not hesitate to call me. I look forward to following your patient along with you.       Sincerely,    Gina Lin MD

## 2021-11-22 NOTE — PROGRESS NOTES
Identified pt with two pt identifiers(name and ). Reviewed record in preparation for visit and have obtained necessary documentation. All patient medications has been reviewed. Chief Complaint   Patient presents with    Possible Hernia     Seen at the request of Dr Jelena Dobson for eval of hernia        Health Maintenance Due   Topic    Hepatitis C Screening     COVID-19 Vaccine (1)    Lipid Screen     Shingrix Vaccine Age 50> (1 of 2)    Breast Cancer Screen Mammogram     Flu Vaccine (1)       Vitals:    21 0814   BP: 124/80   Pulse: 80   Resp: 16   Temp: 97.3 °F (36.3 °C)   TempSrc: Temporal   SpO2: 98%   Weight: 94.3 kg (208 lb)   Height: 5' 3\" (1.6 m)   PainSc:   4   PainLoc: Abdomen   LMP: 2014       4. Have you been to the ER, urgent care clinic since your last visit? Hospitalized since your last visit? No    5. Have you seen or consulted any other health care providers outside of the [address] [place] [address] since your last visit? Include any pap smears or colon screening. No      Patient is accompanied by self I have received verbal consent from Rohini Calhoun to discuss any/all medical information while they are present in the room.

## 2021-12-15 ENCOUNTER — TELEPHONE (OUTPATIENT)
Dept: SURGERY | Age: 61
End: 2021-12-15

## 2021-12-15 NOTE — LETTER
NOTIFICATION OF RETURN TO WORK / SCHOOL    12/16/2021 4:00 PM    Ms. Ciera ARIAS [phone]        To Whom It May Concern:      Gennaro Campos was under the care of [address] [place] [address]     She will be having surgery on December 30, 2021. She will have the following restrictions after    surgery: No driving for the first 5-7 days after surgery. No lifting, bending or pulling anything over 10 pounds for 6 weeks. She will be able to return to work on February 10, 2022 with no restrictions. If there are questions or concerns please have the patient contact our office.     Sincerely,      Rylee Arriaga MD

## 2021-12-16 NOTE — TELEPHONE ENCOUNTER
Spoke with patient to let her know I can write a letter with restrictions and how long she is expected to be out of work. Letter faxed over [phone], confirmation receive. Copy of letter mailed to patient per her request to her home address.

## 2021-12-16 NOTE — PERIOP NOTES
[facility] [place] [facility]  Preoperative Instructions    COVID TEST  12/27/21  Anytime between 7 am and 11:45 am  MOB [address]    Surgery Date 12/30/21          Time of Arrival -to be called  Contact # [phone]  1. On the day of your surgery, please report to the Surgical Services Registration Desk and sign in at your designated time. The Surgery Center is located to the right of the Emergency Room. 2. You must have someone with you to drive you home. You should not drive a car for 24 hours following surgery. Please make arrangements for a friend or family member to stay with you for the first 24 hours after your surgery. 3. Do not have anything to eat or drink (including water, gum, mints, coffee, juice) after midnight ?12/29/21  . ? This may not apply to medications prescribed by your physician. ?(Please note below the special instructions with medications to take the morning of your procedure.)    4. We recommend you do not drink any alcoholic beverages for 24 hours before and after your surgery. 5. Contact your surgeons office for instructions on the following medications: non-steroidal anti-inflammatory drugs (i.e. Advil, Aleve), vitamins, and supplements. (Some surgeons will want you to stop these medications prior to surgery and others may allow you to take them)  **If you are currently taking Plavix, Coumadin, Aspirin and/or other blood-thinning agents, contact your surgeon for instructions. ** Your surgeon will partner with the physician prescribing these medications to determine if it is safe to stop or if you need to continue taking. Please do not stop taking these medications without instructions from your surgeon    6. Wear comfortable clothes. Wear glasses instead of contacts. Do not bring any money or jewelry. Please bring picture ID, insurance card, and any prearranged co-payment or hospital payment. Do not wear make-up, particularly mascara the morning of your surgery. Please call patient. I could not understand what she was telling me.    Patient was given antibiotic for her leg injury cephalexin 500mg.    She is now having severe diarrhea. Patient would like something called in to help.   Do not wear nail polish, particularly if you are having foot /hand surgery. Wear your hair loose or down, no ponytails, buns, carlo pins or clips. All body piercings must be removed. Please shower with antibacterial soap for three consecutive days before and on the morning of surgery, but do not apply any lotions, powders or deodorants after the shower on the day of surgery. Please use a fresh towels after each shower. Please sleep in clean clothes and change bed linens the night before surgery. Please do not shave for 48 hours prior to surgery. Shaving of the face is acceptable. 7. You should understand that if you do not follow these instructions your surgery may be cancelled. If your physical condition changes (I.e. fever, cold or flu) please contact your surgeon as soon as possible. 8. It is important that you be on time. If a situation occurs where you may be late, please call [phone] (OR Holding Area). 9. If you have any questions and or problems, please call [phone] (Pre-admission Testing). 10. Your surgery time may be subject to change. You will receive a phone call the evening prior if your time changes. 11.  If having outpatient surgery, you must have someone to drive you here, stay with you during the duration of your stay, and to drive you home at time of discharge. Special Instructions:     TAKE ALL MEDICATIONS DAY OF SURGERY EXCEPT:no exception      I understand a pre-operative phone call will be made to verify my surgery time. In the event that I am not available, I give permission for a message to be left on my answering service and/or with another person?   yes          ___________________      __________   _________    (Signature of Patient)             (Witness)                (Date and Time)

## 2021-12-27 ENCOUNTER — HOSPITAL ENCOUNTER (OUTPATIENT)
Dept: PREADMISSION TESTING | Age: 61
Discharge: HOME | End: 2021-12-27
Payer: COMMERCIAL

## 2021-12-27 PROCEDURE — U0005 INFEC AGEN DETEC AMPLI PROBE: HCPCS

## 2021-12-28 LAB
SARS-COV-2, XPLCVT: NOT DETECTED
SOURCE, COVRS: NORMAL

## 2021-12-30 ENCOUNTER — ANESTHESIA (OUTPATIENT)
Dept: SURGERY | Age: 61
End: 2021-12-30
Payer: COMMERCIAL

## 2021-12-30 ENCOUNTER — ANESTHESIA EVENT (OUTPATIENT)
Dept: SURGERY | Age: 61
End: 2021-12-30
Payer: COMMERCIAL

## 2021-12-30 ENCOUNTER — HOSPITAL ENCOUNTER (OUTPATIENT)
Age: 61
Discharge: HOME | End: 2022-01-01
Attending: SURGERY | Admitting: SURGERY
Payer: COMMERCIAL

## 2021-12-30 DIAGNOSIS — K43.2 INCISIONAL HERNIA, WITHOUT OBSTRUCTION OR GANGRENE: Primary | ICD-10-CM

## 2021-12-30 PROCEDURE — 74011000250 HC RX REV CODE- 250: Performed by: SURGERY

## 2021-12-30 PROCEDURE — 77030009851 HC PCH RTVR ENDOSC AMR -B: Performed by: SURGERY

## 2021-12-30 PROCEDURE — 77030031139 HC SUT VCRL2 J&J -A: Performed by: SURGERY

## 2021-12-30 PROCEDURE — 77030040361 HC SLV COMPR DVT MDII -B: Performed by: SURGERY

## 2021-12-30 PROCEDURE — 74011250636 HC RX REV CODE- 250/636: Performed by: SURGERY

## 2021-12-30 PROCEDURE — 74011250637 HC RX REV CODE- 250/637: Performed by: SURGERY

## 2021-12-30 PROCEDURE — 77030019908 HC STETH ESOPH SIMS -A: Performed by: ANESTHESIOLOGY

## 2021-12-30 PROCEDURE — 74011250636 HC RX REV CODE- 250/636: Performed by: ANESTHESIOLOGY

## 2021-12-30 PROCEDURE — 49321 LAPAROSCOPY BIOPSY: CPT | Performed by: SURGERY

## 2021-12-30 PROCEDURE — 76010000875 HC OR TIME 1.5 TO 2HR INTENSV - TIER 2: Performed by: SURGERY

## 2021-12-30 PROCEDURE — 74011250636 HC RX REV CODE- 250/636

## 2021-12-30 PROCEDURE — 77030002895 HC DEV VASC CLOSR COVD -B: Performed by: SURGERY

## 2021-12-30 PROCEDURE — 74011250636 HC RX REV CODE- 250/636: Performed by: NURSE ANESTHETIST, CERTIFIED REGISTERED

## 2021-12-30 PROCEDURE — 76060000034 HC ANESTHESIA 1.5 TO 2 HR: Performed by: SURGERY

## 2021-12-30 PROCEDURE — 77030012770 HC TRCR OPT FX AMR -B: Performed by: SURGERY

## 2021-12-30 PROCEDURE — 77030016151 HC PROTCTR LNS DFOG COVD -B: Performed by: SURGERY

## 2021-12-30 PROCEDURE — G0378 HOSPITAL OBSERVATION PER HR: HCPCS

## 2021-12-30 PROCEDURE — 76210000019 HC OR PH I REC 4 TO 4.5 HR: Performed by: SURGERY

## 2021-12-30 PROCEDURE — 88185 FLOWCYTOMETRY/TC ADD-ON: CPT

## 2021-12-30 PROCEDURE — 77030022704 HC SUT VLOC COVD -B: Performed by: SURGERY

## 2021-12-30 PROCEDURE — 77030020703 HC SEAL CANN DISP INTU -B: Performed by: SURGERY

## 2021-12-30 PROCEDURE — 77030013079 HC BLNKT BAIR HGGR 3M -A: Performed by: ANESTHESIOLOGY

## 2021-12-30 PROCEDURE — 88311 DECALCIFY TISSUE: CPT

## 2021-12-30 PROCEDURE — 88184 FLOWCYTOMETRY/ TC 1 MARKER: CPT

## 2021-12-30 PROCEDURE — 88304 TISSUE EXAM BY PATHOLOGIST: CPT

## 2021-12-30 PROCEDURE — 77030035277 HC OBTRTR BLDELSS DISP INTU -B: Performed by: SURGERY

## 2021-12-30 PROCEDURE — 77030028402 HC SYS LAPSC TISS RETRV AMR -B: Performed by: SURGERY

## 2021-12-30 PROCEDURE — 77030036554: Performed by: SURGERY

## 2021-12-30 PROCEDURE — 49654 PR LAP, INCISIONAL HERNIA REPAIR,REDUCIBLE: CPT | Performed by: SURGERY

## 2021-12-30 PROCEDURE — 77030029357 HC DEV CLSR FAC SYS EFX TELE -C: Performed by: SURGERY

## 2021-12-30 PROCEDURE — 77030008684 HC TU ET CUF COVD -B: Performed by: ANESTHESIOLOGY

## 2021-12-30 PROCEDURE — 88305 TISSUE EXAM BY PATHOLOGIST: CPT

## 2021-12-30 PROCEDURE — 74011000250 HC RX REV CODE- 250: Performed by: NURSE ANESTHETIST, CERTIFIED REGISTERED

## 2021-12-30 PROCEDURE — 77030008606 HC TRCR ENDOSC KII AMR -B: Performed by: SURGERY

## 2021-12-30 PROCEDURE — C1781 MESH (IMPLANTABLE): HCPCS | Performed by: SURGERY

## 2021-12-30 PROCEDURE — 2709999900 HC NON-CHARGEABLE SUPPLY: Performed by: SURGERY

## 2021-12-30 PROCEDURE — 77030026438 HC STYL ET INTUB CARD -A: Performed by: ANESTHESIOLOGY

## 2021-12-30 DEVICE — VENTRALIGHT ST MESH, 4" X 6" (10.2 CM X 15.2 CM), ELLIPSE
Type: IMPLANTABLE DEVICE | Site: ABDOMEN | Status: FUNCTIONAL
Brand: VENTRALIGHT

## 2021-12-30 RX ORDER — EPHEDRINE SULFATE/0.9% NACL/PF 50 MG/5 ML
SYRINGE (ML) INTRAVENOUS AS NEEDED
Status: DISCONTINUED | OUTPATIENT
Start: 2021-12-30 | End: 2021-12-30 | Stop reason: HOSPADM

## 2021-12-30 RX ORDER — DEXMEDETOMIDINE HYDROCHLORIDE 100 UG/ML
INJECTION, SOLUTION INTRAVENOUS AS NEEDED
Status: DISCONTINUED | OUTPATIENT
Start: 2021-12-30 | End: 2021-12-30 | Stop reason: HOSPADM

## 2021-12-30 RX ORDER — DEXTROSE, SODIUM CHLORIDE, AND POTASSIUM CHLORIDE 5; .45; .15 G/100ML; G/100ML; G/100ML
50 INJECTION INTRAVENOUS CONTINUOUS
Status: DISCONTINUED | OUTPATIENT
Start: 2021-12-30 | End: 2022-01-01 | Stop reason: HOSPADM

## 2021-12-30 RX ORDER — GLYCOPYRROLATE 0.2 MG/ML
INJECTION INTRAMUSCULAR; INTRAVENOUS AS NEEDED
Status: DISCONTINUED | OUTPATIENT
Start: 2021-12-30 | End: 2021-12-30 | Stop reason: HOSPADM

## 2021-12-30 RX ORDER — SODIUM CHLORIDE 0.9 % (FLUSH) 0.9 %
5-40 SYRINGE (ML) INJECTION AS NEEDED
Status: DISCONTINUED | OUTPATIENT
Start: 2021-12-30 | End: 2022-01-01 | Stop reason: HOSPADM

## 2021-12-30 RX ORDER — OXYCODONE AND ACETAMINOPHEN 5; 325 MG/1; MG/1
1 TABLET ORAL
Qty: 20 TABLET | Refills: 0 | Status: SHIPPED | OUTPATIENT
Start: 2021-12-30 | End: 2022-01-04

## 2021-12-30 RX ORDER — BUPIVACAINE HYDROCHLORIDE AND EPINEPHRINE 2.5; 5 MG/ML; UG/ML
INJECTION, SOLUTION EPIDURAL; INFILTRATION; INTRACAUDAL; PERINEURAL AS NEEDED
Status: DISCONTINUED | OUTPATIENT
Start: 2021-12-30 | End: 2021-12-30 | Stop reason: HOSPADM

## 2021-12-30 RX ORDER — HYDROMORPHONE HYDROCHLORIDE 1 MG/ML
0.2 INJECTION, SOLUTION INTRAMUSCULAR; INTRAVENOUS; SUBCUTANEOUS
Status: DISCONTINUED | OUTPATIENT
Start: 2021-12-30 | End: 2021-12-30 | Stop reason: HOSPADM

## 2021-12-30 RX ORDER — HYDROMORPHONE HYDROCHLORIDE 1 MG/ML
1 INJECTION, SOLUTION INTRAMUSCULAR; INTRAVENOUS; SUBCUTANEOUS ONCE
Status: COMPLETED | OUTPATIENT
Start: 2021-12-30 | End: 2021-12-30

## 2021-12-30 RX ORDER — POLYETHYLENE GLYCOL 3350 17 G/17G
17 POWDER, FOR SOLUTION ORAL 2 TIMES DAILY
Qty: 60 PACKET | Refills: 0 | Status: SHIPPED | OUTPATIENT
Start: 2021-12-30 | End: 2022-01-29

## 2021-12-30 RX ORDER — HYDROCHLOROTHIAZIDE 25 MG/1
25 TABLET ORAL DAILY
Status: DISCONTINUED | OUTPATIENT
Start: 2021-12-31 | End: 2022-01-01 | Stop reason: HOSPADM

## 2021-12-30 RX ORDER — SODIUM CHLORIDE 0.9 % (FLUSH) 0.9 %
5-40 SYRINGE (ML) INJECTION EVERY 8 HOURS
Status: DISCONTINUED | OUTPATIENT
Start: 2021-12-30 | End: 2021-12-30 | Stop reason: HOSPADM

## 2021-12-30 RX ORDER — KETOROLAC TROMETHAMINE 30 MG/ML
INJECTION, SOLUTION INTRAMUSCULAR; INTRAVENOUS
Status: DISPENSED
Start: 2021-12-30 | End: 2021-12-31

## 2021-12-30 RX ORDER — KETOROLAC TROMETHAMINE 30 MG/ML
30 INJECTION, SOLUTION INTRAMUSCULAR; INTRAVENOUS
Status: DISPENSED | OUTPATIENT
Start: 2021-12-30 | End: 2021-12-31

## 2021-12-30 RX ORDER — DEXAMETHASONE SODIUM PHOSPHATE 4 MG/ML
INJECTION, SOLUTION INTRA-ARTICULAR; INTRALESIONAL; INTRAMUSCULAR; INTRAVENOUS; SOFT TISSUE AS NEEDED
Status: DISCONTINUED | OUTPATIENT
Start: 2021-12-30 | End: 2021-12-30 | Stop reason: HOSPADM

## 2021-12-30 RX ORDER — FAMOTIDINE 20 MG/1
40 TABLET, FILM COATED ORAL 2 TIMES DAILY
Status: DISCONTINUED | OUTPATIENT
Start: 2021-12-30 | End: 2022-01-01 | Stop reason: HOSPADM

## 2021-12-30 RX ORDER — BUPROPION HYDROCHLORIDE 150 MG/1
150 TABLET ORAL DAILY
Status: DISCONTINUED | OUTPATIENT
Start: 2021-12-31 | End: 2022-01-01 | Stop reason: HOSPADM

## 2021-12-30 RX ORDER — SODIUM CHLORIDE 0.9 % (FLUSH) 0.9 %
5-40 SYRINGE (ML) INJECTION AS NEEDED
Status: DISCONTINUED | OUTPATIENT
Start: 2021-12-30 | End: 2021-12-30 | Stop reason: HOSPADM

## 2021-12-30 RX ORDER — ONDANSETRON 2 MG/ML
INJECTION INTRAMUSCULAR; INTRAVENOUS AS NEEDED
Status: DISCONTINUED | OUTPATIENT
Start: 2021-12-30 | End: 2021-12-30 | Stop reason: HOSPADM

## 2021-12-30 RX ORDER — SODIUM CHLORIDE 0.9 % (FLUSH) 0.9 %
5-40 SYRINGE (ML) INJECTION EVERY 8 HOURS
Status: DISCONTINUED | OUTPATIENT
Start: 2021-12-30 | End: 2022-01-01 | Stop reason: HOSPADM

## 2021-12-30 RX ORDER — ONDANSETRON 2 MG/ML
4 INJECTION INTRAMUSCULAR; INTRAVENOUS AS NEEDED
Status: DISCONTINUED | OUTPATIENT
Start: 2021-12-30 | End: 2021-12-30 | Stop reason: HOSPADM

## 2021-12-30 RX ORDER — CEFAZOLIN SODIUM 1 G/3ML
2 INJECTION, POWDER, FOR SOLUTION INTRAMUSCULAR; INTRAVENOUS ONCE
Status: COMPLETED | OUTPATIENT
Start: 2021-12-30 | End: 2021-12-30

## 2021-12-30 RX ORDER — ENOXAPARIN SODIUM 100 MG/ML
40 INJECTION SUBCUTANEOUS EVERY 24 HOURS
Status: DISCONTINUED | OUTPATIENT
Start: 2021-12-30 | End: 2022-01-01 | Stop reason: HOSPADM

## 2021-12-30 RX ORDER — MIDAZOLAM HYDROCHLORIDE 1 MG/ML
INJECTION, SOLUTION INTRAMUSCULAR; INTRAVENOUS AS NEEDED
Status: DISCONTINUED | OUTPATIENT
Start: 2021-12-30 | End: 2021-12-30 | Stop reason: HOSPADM

## 2021-12-30 RX ORDER — NEOSTIGMINE METHYLSULFATE 1 MG/ML
INJECTION, SOLUTION INTRAVENOUS AS NEEDED
Status: DISCONTINUED | OUTPATIENT
Start: 2021-12-30 | End: 2021-12-30 | Stop reason: HOSPADM

## 2021-12-30 RX ORDER — LIDOCAINE HYDROCHLORIDE 20 MG/ML
INJECTION, SOLUTION EPIDURAL; INFILTRATION; INTRACAUDAL; PERINEURAL AS NEEDED
Status: DISCONTINUED | OUTPATIENT
Start: 2021-12-30 | End: 2021-12-30 | Stop reason: HOSPADM

## 2021-12-30 RX ORDER — PROPOFOL 10 MG/ML
INJECTION, EMULSION INTRAVENOUS AS NEEDED
Status: DISCONTINUED | OUTPATIENT
Start: 2021-12-30 | End: 2021-12-30 | Stop reason: HOSPADM

## 2021-12-30 RX ORDER — HYDROMORPHONE HYDROCHLORIDE 2 MG/ML
INJECTION, SOLUTION INTRAMUSCULAR; INTRAVENOUS; SUBCUTANEOUS AS NEEDED
Status: DISCONTINUED | OUTPATIENT
Start: 2021-12-30 | End: 2021-12-30 | Stop reason: HOSPADM

## 2021-12-30 RX ORDER — NEOSTIGMINE METHYLSULFATE 1 MG/ML
INJECTION, SOLUTION INTRAVENOUS AS NEEDED
Status: DISCONTINUED | OUTPATIENT
Start: 2021-12-30 | End: 2021-12-30

## 2021-12-30 RX ORDER — NALOXONE HYDROCHLORIDE 0.4 MG/ML
0.4 INJECTION, SOLUTION INTRAMUSCULAR; INTRAVENOUS; SUBCUTANEOUS AS NEEDED
Status: DISCONTINUED | OUTPATIENT
Start: 2021-12-30 | End: 2022-01-01 | Stop reason: HOSPADM

## 2021-12-30 RX ORDER — FENTANYL CITRATE 50 UG/ML
25 INJECTION, SOLUTION INTRAMUSCULAR; INTRAVENOUS
Status: COMPLETED | OUTPATIENT
Start: 2021-12-30 | End: 2021-12-30

## 2021-12-30 RX ORDER — OXYCODONE HYDROCHLORIDE 5 MG/1
5 TABLET ORAL
Status: DISCONTINUED | OUTPATIENT
Start: 2021-12-30 | End: 2022-01-01 | Stop reason: HOSPADM

## 2021-12-30 RX ORDER — DEXTROSE, SODIUM CHLORIDE, AND POTASSIUM CHLORIDE 5; .45; .15 G/100ML; G/100ML; G/100ML
INJECTION INTRAVENOUS
Status: COMPLETED
Start: 2021-12-30 | End: 2021-12-30

## 2021-12-30 RX ORDER — HYDROMORPHONE HYDROCHLORIDE 1 MG/ML
.5-1 INJECTION, SOLUTION INTRAMUSCULAR; INTRAVENOUS; SUBCUTANEOUS
Status: DISCONTINUED | OUTPATIENT
Start: 2021-12-30 | End: 2022-01-01 | Stop reason: HOSPADM

## 2021-12-30 RX ORDER — ROCURONIUM BROMIDE 10 MG/ML
INJECTION, SOLUTION INTRAVENOUS AS NEEDED
Status: DISCONTINUED | OUTPATIENT
Start: 2021-12-30 | End: 2021-12-30 | Stop reason: HOSPADM

## 2021-12-30 RX ORDER — SODIUM CHLORIDE, SODIUM LACTATE, POTASSIUM CHLORIDE, CALCIUM CHLORIDE 600; 310; 30; 20 MG/100ML; MG/100ML; MG/100ML; MG/100ML
25 INJECTION, SOLUTION INTRAVENOUS CONTINUOUS
Status: DISCONTINUED | OUTPATIENT
Start: 2021-12-30 | End: 2021-12-30 | Stop reason: HOSPADM

## 2021-12-30 RX ORDER — IBUPROFEN 800 MG/1
800 TABLET ORAL
Qty: 30 TABLET | Refills: 0 | Status: SHIPPED | OUTPATIENT
Start: 2021-12-30 | End: 2022-01-09

## 2021-12-30 RX ORDER — ONDANSETRON 4 MG/1
4 TABLET, ORALLY DISINTEGRATING ORAL
Status: DISCONTINUED | OUTPATIENT
Start: 2021-12-30 | End: 2022-01-01 | Stop reason: HOSPADM

## 2021-12-30 RX ADMIN — Medication 10 MG: at 10:16

## 2021-12-30 RX ADMIN — ENOXAPARIN SODIUM 40 MG: 100 INJECTION SUBCUTANEOUS at 17:24

## 2021-12-30 RX ADMIN — PROPOFOL 50 MG: 10 INJECTION, EMULSION INTRAVENOUS at 10:17

## 2021-12-30 RX ADMIN — HYDROMORPHONE HYDROCHLORIDE 0.2 MG: 1 INJECTION, SOLUTION INTRAMUSCULAR; INTRAVENOUS; SUBCUTANEOUS at 11:54

## 2021-12-30 RX ADMIN — GLYCOPYRROLATE 0.4 MG: 0.2 INJECTION, SOLUTION INTRAMUSCULAR; INTRAVENOUS at 10:45

## 2021-12-30 RX ADMIN — Medication 3 AMPULE: at 08:57

## 2021-12-30 RX ADMIN — FENTANYL CITRATE 25 MCG: 50 INJECTION INTRAMUSCULAR; INTRAVENOUS at 11:25

## 2021-12-30 RX ADMIN — MIDAZOLAM HYDROCHLORIDE 2 MG: 1 INJECTION, SOLUTION INTRAMUSCULAR; INTRAVENOUS at 09:10

## 2021-12-30 RX ADMIN — ROCURONIUM BROMIDE 40 MG: 10 INJECTION INTRAVENOUS at 09:21

## 2021-12-30 RX ADMIN — OXYCODONE 5 MG: 5 TABLET ORAL at 17:24

## 2021-12-30 RX ADMIN — HYDROMORPHONE HYDROCHLORIDE 0.2 MG: 1 INJECTION, SOLUTION INTRAMUSCULAR; INTRAVENOUS; SUBCUTANEOUS at 11:39

## 2021-12-30 RX ADMIN — Medication 10 ML: at 17:25

## 2021-12-30 RX ADMIN — HYDROMORPHONE HYDROCHLORIDE 0.2 MG: 1 INJECTION, SOLUTION INTRAMUSCULAR; INTRAVENOUS; SUBCUTANEOUS at 12:08

## 2021-12-30 RX ADMIN — POTASSIUM CHLORIDE, DEXTROSE MONOHYDRATE AND SODIUM CHLORIDE 50 ML/HR: 150; 5; 450 INJECTION, SOLUTION INTRAVENOUS at 14:05

## 2021-12-30 RX ADMIN — HYDROMORPHONE HYDROCHLORIDE 0.2 MG: 1 INJECTION, SOLUTION INTRAMUSCULAR; INTRAVENOUS; SUBCUTANEOUS at 14:13

## 2021-12-30 RX ADMIN — KETOROLAC TROMETHAMINE 30 MG: 30 INJECTION, SOLUTION INTRAMUSCULAR; INTRAVENOUS at 13:33

## 2021-12-30 RX ADMIN — HYDROMORPHONE HYDROCHLORIDE 0.2 MG: 1 INJECTION, SOLUTION INTRAMUSCULAR; INTRAVENOUS; SUBCUTANEOUS at 12:37

## 2021-12-30 RX ADMIN — FENTANYL CITRATE 25 MCG: 50 INJECTION INTRAMUSCULAR; INTRAVENOUS at 11:20

## 2021-12-30 RX ADMIN — Medication 1 AMPULE: at 21:16

## 2021-12-30 RX ADMIN — Medication 10 MG: at 09:41

## 2021-12-30 RX ADMIN — SODIUM CHLORIDE, POTASSIUM CHLORIDE, SODIUM LACTATE AND CALCIUM CHLORIDE: 600; 310; 30; 20 INJECTION, SOLUTION INTRAVENOUS at 10:55

## 2021-12-30 RX ADMIN — Medication 3 MG: at 10:46

## 2021-12-30 RX ADMIN — LIDOCAINE HYDROCHLORIDE 100 MG: 20 INJECTION, SOLUTION INTRAVENOUS at 09:20

## 2021-12-30 RX ADMIN — HYDROMORPHONE HYDROCHLORIDE 1 MG: 1 INJECTION, SOLUTION INTRAMUSCULAR; INTRAVENOUS; SUBCUTANEOUS at 12:19

## 2021-12-30 RX ADMIN — POTASSIUM CHLORIDE, DEXTROSE MONOHYDRATE AND SODIUM CHLORIDE 50 ML/HR: 150; 5; 450 INJECTION, SOLUTION INTRAVENOUS at 23:55

## 2021-12-30 RX ADMIN — DEXAMETHASONE SODIUM PHOSPHATE 8 MG: 4 INJECTION, SOLUTION INTRAMUSCULAR; INTRAVENOUS at 09:32

## 2021-12-30 RX ADMIN — ONDANSETRON HYDROCHLORIDE 4 MG: 2 INJECTION, SOLUTION INTRAMUSCULAR; INTRAVENOUS at 09:34

## 2021-12-30 RX ADMIN — PROPOFOL 150 MG: 10 INJECTION, EMULSION INTRAVENOUS at 09:21

## 2021-12-30 RX ADMIN — ROCURONIUM BROMIDE 10 MG: 10 INJECTION INTRAVENOUS at 10:19

## 2021-12-30 RX ADMIN — FENTANYL CITRATE 25 MCG: 50 INJECTION INTRAMUSCULAR; INTRAVENOUS at 11:10

## 2021-12-30 RX ADMIN — Medication 10 ML: at 21:16

## 2021-12-30 RX ADMIN — SODIUM CHLORIDE, POTASSIUM CHLORIDE, SODIUM LACTATE AND CALCIUM CHLORIDE 25 ML/HR: 600; 310; 30; 20 INJECTION, SOLUTION INTRAVENOUS at 08:57

## 2021-12-30 RX ADMIN — DEXMEDETOMIDINE HYDROCHLORIDE 4 MCG: 100 INJECTION, SOLUTION, CONCENTRATE INTRAVENOUS at 09:56

## 2021-12-30 RX ADMIN — Medication 1 AMPULE: at 17:25

## 2021-12-30 RX ADMIN — HYDROMORPHONE HYDROCHLORIDE 0.5 MG: 2 INJECTION, SOLUTION INTRAMUSCULAR; INTRAVENOUS; SUBCUTANEOUS at 09:48

## 2021-12-30 RX ADMIN — HYDROMORPHONE HYDROCHLORIDE 0.5 MG: 2 INJECTION, SOLUTION INTRAMUSCULAR; INTRAVENOUS; SUBCUTANEOUS at 09:10

## 2021-12-30 RX ADMIN — FENTANYL CITRATE 25 MCG: 50 INJECTION INTRAMUSCULAR; INTRAVENOUS at 11:15

## 2021-12-30 RX ADMIN — FAMOTIDINE 40 MG: 20 TABLET ORAL at 17:24

## 2021-12-30 RX ADMIN — CEFAZOLIN 2 G: 1 INJECTION, POWDER, FOR SOLUTION INTRAMUSCULAR; INTRAVENOUS; PARENTERAL at 09:23

## 2021-12-30 RX ADMIN — OXYCODONE 5 MG: 5 TABLET ORAL at 21:17

## 2021-12-30 RX ADMIN — DEXMEDETOMIDINE HYDROCHLORIDE 2 MCG: 100 INJECTION, SOLUTION, CONCENTRATE INTRAVENOUS at 09:51

## 2021-12-30 RX ADMIN — ROCURONIUM BROMIDE 10 MG: 10 INJECTION INTRAVENOUS at 09:44

## 2021-12-30 NOTE — BRIEF OP NOTE
Brief Postoperative Note    Patient: Jaye Locke  YOB: 1960  MRN: [id number]    Date of Procedure: 12/30/2021     Pre-Op Diagnosis: Incisional hernia, without obstruction or gangrene [K43.2]    Post-Op Diagnosis: same, internal hernia, mesenteric mass      Procedure(s):  ROBOTIC ASSISTED LAPAROSCOPIC INCISIONAL HERNIA REPAIR  WITH MESH, messenteric biopsy    Surgeon(s):  Guy Nava MD    Surgical Assistant: Surg Asst-1: Lito Hercules    Anesthesia: General     Estimated Blood Loss (mL): less than 50     Complications: None    Specimens:   ID Type Source Tests Collected by Time Destination   1 : messenteric mass Fresh Abdomen  Guy Nava MD 12/30/2021 0957 Pathology   2 : omentum Preservative Omentum  Guy Nava MD 12/30/2021 1036 Pathology        Implants:   Implant Name Type Inv. Item Serial No.  Lot No. LRB No. Used Action   MESH SURG [id number] ELLIPSE SEPRA Enoch King [id number]  [address] [place] [address] 91 [employer]_WD [id number] N/A 1 Implanted       Drains: * No LDAs found *    Findings:   1. Incisional hernia  2. Mesenteric mass/fullness  3.   Internal hernia    Electronically Signed by Tanika Mariano MD on 12/30/2021 at 10:48 AM

## 2021-12-30 NOTE — ANESTHESIA PREPROCEDURE EVALUATION
Anesthetic History   No history of anesthetic complications            Review of Systems / Medical History  Patient summary reviewed, nursing notes reviewed and pertinent labs reviewed    Pulmonary        Sleep apnea: CPAP           Neuro/Psych   Within defined limits           Cardiovascular    Hypertension: well controlled              Exercise tolerance: >4 METS  Comments: EKG 4/21  Sinus tachycardia   GI/Hepatic/Renal     GERD    Renal disease: CRI      Comments: Hx of right nephrectomy for hx of renal cell carcinoma Endo/Other        Obesity     Other Findings            Physical Exam    Airway  Mallampati: II  TM Distance: > 6 cm  Neck ROM: normal range of motion   Mouth opening: Normal     Cardiovascular  Regular rate and rhythm,  S1 and S2 normal,  no murmur, click, rub, or gallop             Dental  No notable dental hx       Pulmonary  Breath sounds clear to auscultation               Abdominal  GI exam deferred       Other Findings            Anesthetic Plan    ASA: 2  Anesthesia type: general    Monitoring Plan: BIS      Induction: Intravenous  Anesthetic plan and risks discussed with: Patient

## 2021-12-30 NOTE — PERIOP NOTES
Handoff Report from Operating Room to PACU    Report received from Eris Greer RN and Charo Angelo CRNA regarding Kathia Hoff. Surgeon(s):  Héctor Garcia MD  And Procedure(s) (LRB):  ROBOTIC ASSISTED LAPAROSCOPIC INCISIONAL HERNIA REPAIR  WITH MESH, messenteric biopsy (N/A)  confirmed   with dressings discussed. Anesthesia type, drugs, patient history, complications, estimated blood loss, vital signs, intake and output, and last pain medication, lines, reversal medications and temperature were reviewed.

## 2021-12-30 NOTE — OP NOTES
[address]  OPERATIVE REPORT    Name:  Chevy Rasheed  MR#:  [id number]  :  1960  ACCOUNT #:  [de-identified]  DATE OF SERVICE:  2021    PREOPERATIVE DIAGNOSIS:  Incisional hernia. POSTOPERATIVE DIAGNOSES:  1. Incisional hernia. 2.  Internal hernia. 3.  Mesenteric fullness/mass. PROCEDURES PERFORMED:  1.  A laparoscopic incisional hernia repair with mesh and laparoscopic lysis of adhesions with partial omentectomy. 2.  Mesenteric biopsy. SURGEON:  Margarito José MD    ASSISTANT:  Renzo Lopez. ANESTHESIA:  General.    COMPLICATIONS:  None. SPECIMENS REMOVED:  1. Mesenteric mass. 2.  Omentum. IMPLANTS:  Bard Davol mesh 4 x 6 inch Ventralight with [employer], lot# [id number]. ESTIMATED BLOOD LOSS:  Minimal.    FINDINGS:  1. Internal hernia with omentum. 2.  Swollen mesentery. 3.  Incisional hernia. BRIEF HISTORY:  The patient is a 53-year-old female who has had intermittent upper abdominal pain. She has had multiple abdominal operations and had an incisional hernia. CT was non-revealing other than the hernia. She now presents for surgery. PROCEDURE:  The patient was taken to the operating room, placed on the operating table in the supine position, underwent general anesthesia, after which the abdomen was prepped and draped in the usual sterile fashion. After appropriate time-out and antibiotics were given, 0.5% Marcaine with epinephrine was infiltrated into the skin and subcutaneous tissues in the left subcostal region. An 8-mm direct entry trocar was inserted into the peritoneal cavity and insufflation began, maximum 15 mmHg pressure gave good visualization of the peritoneal cavity. Another 8-mm trocar was placed in the left lower quadrant and an 8-mm blade and trocar was also placed in the left lateral abdomen.   We first reduced the omental contents from the hernia sac and then identified that there was an internal hernia, and utilizing the vessel sealer for the robot, we divided the omentum, so as not to leave a very long strand of omentum. Next, the mesentery itself was fairly full and it felt like there was a lobulated mass in the area. Utilizing the vessel sealer, we took a wedge of that and sent that off fresh to pathology as well but looked more like fatty tissue. No evidence of any ongoing bleeding from that. Mesentery of the bowel looked viable, and then we used a #1 nonabsorbable V-Loc suture to close the midline fascia and fascial defect, and then a piece of a 4 x 6 Ventralight mesh inserted around the perimeter with 0 absorbable V-Loc sutures. In order to get the specimens out, we needed to dilate up the left lower quadrant trocar site and we up sized that to a 12-mm trocar. After completing the operation, then we used a Weck closure device and closed the fascia in the left lower quadrant trocar site. More Marcaine was infiltrated around all the incisions. Interrupted 4-0 Vicryl was used to close the deep tissue and deep dermis, and a running 4-0 Vicryl was used to close the skin, and the Dermabond dressing was applied. Upon completion of the operation, the needle, sponge, and instrument counts were correct x2. The patient had tolerated the procedure well, was extubated, and brought to recovery room.         Sea Hawkins MD      [id number]  D:  12/30/2021 11:04  T:  12/30/2021 16:48  JOB #:  [id number]  CC:  Emilia Arenas MD

## 2021-12-30 NOTE — H&P
HISTORY OF PRESENT ILLNESS  Noe Seaman is a 63 y.o. female who comes in for consultation by Chacho Green and Dr Angelina Henley for abdominal pain and a hernia  Abdominal Pain  Associated symptoms include abdominal pain. Pertinent negatives include no chest pain, no headaches and no shortness of breath. Possible Hernia  Associated symptoms include abdominal pain. Pertinent negatives include no chest pain, no headaches and no shortness of breath.      She has been having intermittent epigastric/suprumbilical pain radiating to both sides and RUQ for several years that is getting more bothersome. CT was negative except a hernia. She has had a hx recurrent diverticulitis culminating in a sigmoid colectomy in 2016,  She also had a right nephrectomy and a cholecystectomy. She reports some intermittent nausea and diarrhea/constipation and bloating as well but denies vomiting, melena, hematochezia, dysuria or hematuria.          Past Medical History:   Diagnosis Date    Chronic kidney disease      Diverticulosis      GERD (gastroesophageal reflux disease)      Hypertension      Renal carcinoma, right ([address]       kidney    Sleep apnea              Past Surgical History:   Procedure Laterality Date    HX CERVICAL DISKECTOMY       HX  SECTION    and     HX CHOLECYSTECTOMY       HX CHOLECYSTECTOMY        HX GYN         ablation    HX GYN   2015     hysterectomy    HX NEPHRECTOMY Right 2018    HX ORTHOPAEDIC         Neck Surgery.      HX ORTHOPAEDIC Left       knee surgery x 3    HX OTHER SURGICAL   2016     colon resection    HX ROTATOR CUFF REPAIR Right 2006    HX TUMOR REMOVAL   2018     R Kidney             Family History   Problem Relation Age of Onset    Hypertension Mother      Hypertension Father      Diabetes Sister      Anesth Problems Neg Hx        Social History            Tobacco Use    Smoking status: Never Smoker    Smokeless tobacco: Never Used   Substance Use Topics    Alcohol use: Yes       Comment: OCC    Drug use: No           Current Outpatient Medications   Medication Sig    famotidine (PEPCID) 40 mg tablet Take 1 Tab by mouth.  bran-gum-fib-celina-psyl-kelp-pec (Fiber 6) 1,000 mg tab as directed    buPROPion XL (WELLBUTRIN XL) 150 mg tablet Take 1 Tab by mouth.  hydroCHLOROthiazide (HYDRODIURIL) 25 mg tablet Take 25 mg by mouth daily.  B.infantis-B.ani-B.long-B.bifi (PROBIOTIC 4X) 10-15 mg TbEC Take 10-15 mg by mouth daily.  cholecalciferol (VITAMIN D3) (5000 Units/125 mcg) tab tablet Take  by mouth.      No current facility-administered medications for this visit.      No Known Allergies     Review of Systems   Constitutional: Negative for chills, diaphoresis, fever, malaise/fatigue and weight loss. HENT: Negative for congestion, ear pain and sore throat. Eyes: Negative for blurred vision and pain. Respiratory: Negative for cough, hemoptysis, sputum production, shortness of breath, wheezing and stridor. Cardiovascular: Negative for chest pain, palpitations, orthopnea, claudication, leg swelling and PND. Gastrointestinal: Positive for abdominal pain, constipation, diarrhea and nausea. Negative for blood in stool, heartburn, melena and vomiting. Genitourinary: Negative for dysuria, flank pain, frequency, hematuria and urgency. Musculoskeletal: Positive for back pain. Negative for joint pain, myalgias and neck pain. Skin: Negative for itching and rash. Neurological: Negative for dizziness, tremors, focal weakness, seizures, weakness and headaches. Endo/Heme/Allergies: Negative for polydipsia. Psychiatric/Behavioral: Positive for depression. Negative for memory loss.  The patient is not nervous/anxious.       Visit Vitals  /80 (BP 1 Location: Left upper arm, BP Patient Position: Sitting, BP Cuff Size: Adult)   Pulse 80   Temp 97.3 °F (36.3 °C) (Temporal)   Resp 16   Ht 5' 3\" (1.6 m)   Wt 94.3 kg (208 lb)   LMP 08/26/2014 SpO2 98%   BMI 36.85 kg/m²         Physical Exam  Constitutional:       General: She is not in acute distress. Appearance: She is well-developed. She is not diaphoretic. HENT:      Head: Normocephalic and atraumatic. Mouth/Throat:      Pharynx: No oropharyngeal exudate. Eyes:      General: No scleral icterus. Conjunctiva/sclera: Conjunctivae normal.      Pupils: Pupils are equal, round, and reactive to light. Neck:      Thyroid: No thyromegaly. Vascular: No JVD. Trachea: No tracheal deviation. Cardiovascular:      Rate and Rhythm: Normal rate and regular rhythm. Heart sounds: No murmur heard. No friction rub. No gallop. Pulmonary:      Effort: Pulmonary effort is normal. No respiratory distress. Breath sounds: Normal breath sounds. No wheezing or rales. Abdominal:      General: Bowel sounds are normal. There is no distension. Palpations: Abdomen is soft. Abdomen is not rigid. There is no mass. Tenderness: There is no abdominal tenderness. There is no guarding or rebound. Negative signs include Peralta's sign and McBurney's sign. Hernia: A hernia (reducible incisional hernia) is present. There is no hernia in the ventral area. Musculoskeletal:         General: Normal range of motion. Cervical back: Normal range of motion and neck supple. Lymphadenopathy:      Cervical: No cervical adenopathy. Skin:     General: Skin is warm and dry. Coloration: Skin is not pale. Findings: No erythema or rash. Neurological:      Mental Status: She is alert and oriented to person, place, and time. Cranial Nerves: No cranial nerve deficit. Psychiatric:         Behavior: Behavior normal.         Thought Content: Thought content normal.         Judgment: Judgment normal.         I reviewed her CT images today     ASSESSMENT and PLAN  1. Incisional hernia.   I explained about the anatomy and pathophysiology of hernias and the risk of incarceration and strangulation of the bowel. I explained about hernia repairs (open with and without mesh, and robotic assisted and laparoscopic with mesh). I explained the risks and benefits of repair including bleeding, infection, chronic pain, bowel or bladder injury, hernia recurrence, seroma, mesh infection requiring removal.  I explained it would be a six to eight week recuperation with no driving for 5 - 7 days, no lifting for six weeks.       2. Abdominal pain ? ?secondary to #1 vs scar tissue     3. Obesity Body mass index is 36.85 kg/m². recommended weight loss with diet modification and exercise  4.   Depression stable on rx     She is interested in pursuing a robotic assisted incisional hernia repair with mesh and possible lysis of adhesions under general anesthesia as an outpatient with possible overnight stay   She will likely miss 2-3 weeks from work and could go back to light duty        Clary Gutierrez MD FACS

## 2021-12-30 NOTE — PERIOP NOTES
TRANSFER - OUT REPORT:    Verbal report given to 120 Joselin Gamboa RN(name) on [employer]  being transferred to [facility](unit) for routine post - op       Report consisted of patients Situation, Background, Assessment and   Recommendations(SBAR). Information from the following report(s) OR Summary, Procedure Summary, Intake/Output and MAR was reviewed with the receiving nurse. Opportunity for questions and clarification was provided.       Patient transported with:   O2 @ 2 liters  Tech

## 2021-12-30 NOTE — ANESTHESIA POSTPROCEDURE EVALUATION
Procedure(s):  ROBOTIC ASSISTED LAPAROSCOPIC INCISIONAL HERNIA REPAIR  WITH MESH, messenteric biopsy. general    Anesthesia Post Evaluation        Patient location during evaluation: PACU  Note status: Adequate. Level of consciousness: responsive to verbal stimuli and sleepy but conscious  Pain management: satisfactory to patient  Airway patency: patent  Anesthetic complications: no  Cardiovascular status: acceptable  Respiratory status: acceptable  Hydration status: acceptable  Comments: +Post-Anesthesia Evaluation and Assessment    Patient: Joe Schmidt MRN: [id number]  SSN: [id number]   YOB: 1960  Age: 63 y.o. Sex: female      Cardiovascular Function/Vital Signs    /72   Pulse 81   Temp 36.1 °C (97 °F)   Resp 14   Ht 5' 3\" (1.6 m)   Wt 94.9 kg (209 lb 3.5 oz)   SpO2 98%   BMI 37.06 kg/m²     Patient is status post Procedure(s):  ROBOTIC ASSISTED LAPAROSCOPIC INCISIONAL HERNIA REPAIR  WITH MESH, messenteric biopsy. Nausea/Vomiting: Controlled. Postoperative hydration reviewed and adequate. Pain:  Pain Scale 1: Numeric (0 - 10) (12/30/21 1500)  Pain Intensity 1: 4 (12/30/21 1500)   Managed. Neurological Status:   Neuro (WDL): Exceptions to WDL (12/30/21 1109)   At baseline. Mental Status and Level of Consciousness: Arousable. Pulmonary Status:   O2 Device: Nasal cannula (12/30/21 1109)   Adequate oxygenation and airway patent. Complications related to anesthesia: None    Post-anesthesia assessment completed. No concerns. Signed By: Colton Fine MD    12/30/2021  Post anesthesia nausea and vomiting:  controlled      INITIAL Post-op Vital signs:   Vitals Value Taken Time   /72 12/30/21 1500   Temp 36.1 °C (97 °F) 12/30/21 1109   Pulse 74 12/30/21 1503   Resp 13 12/30/21 1503   SpO2 98 % 12/30/21 1503   Vitals shown include unvalidated device data.

## 2021-12-31 PROCEDURE — 74011250636 HC RX REV CODE- 250/636: Performed by: SURGERY

## 2021-12-31 PROCEDURE — 74011250637 HC RX REV CODE- 250/637: Performed by: SURGERY

## 2021-12-31 PROCEDURE — 96376 TX/PRO/DX INJ SAME DRUG ADON: CPT

## 2021-12-31 PROCEDURE — 96374 THER/PROPH/DIAG INJ IV PUSH: CPT

## 2021-12-31 PROCEDURE — 77010033678 HC OXYGEN DAILY

## 2021-12-31 PROCEDURE — 96372 THER/PROPH/DIAG INJ SC/IM: CPT

## 2021-12-31 PROCEDURE — G0378 HOSPITAL OBSERVATION PER HR: HCPCS

## 2021-12-31 RX ORDER — POLYETHYLENE GLYCOL 3350 17 G/17G
17 POWDER, FOR SOLUTION ORAL DAILY
Status: DISCONTINUED | OUTPATIENT
Start: 2021-12-31 | End: 2022-01-01 | Stop reason: HOSPADM

## 2021-12-31 RX ORDER — KETOROLAC TROMETHAMINE 30 MG/ML
30 INJECTION, SOLUTION INTRAMUSCULAR; INTRAVENOUS
Status: DISCONTINUED | OUTPATIENT
Start: 2021-12-31 | End: 2022-01-01 | Stop reason: HOSPADM

## 2021-12-31 RX ADMIN — Medication 10 ML: at 20:13

## 2021-12-31 RX ADMIN — KETOROLAC TROMETHAMINE 30 MG: 30 INJECTION, SOLUTION INTRAMUSCULAR; INTRAVENOUS at 07:55

## 2021-12-31 RX ADMIN — POTASSIUM CHLORIDE, DEXTROSE MONOHYDRATE AND SODIUM CHLORIDE 50 ML/HR: 150; 5; 450 INJECTION, SOLUTION INTRAVENOUS at 07:57

## 2021-12-31 RX ADMIN — FAMOTIDINE 40 MG: 20 TABLET ORAL at 17:50

## 2021-12-31 RX ADMIN — HYDROCHLOROTHIAZIDE 25 MG: 25 TABLET ORAL at 08:57

## 2021-12-31 RX ADMIN — BUPROPION HYDROCHLORIDE 150 MG: 150 TABLET, EXTENDED RELEASE ORAL at 08:57

## 2021-12-31 RX ADMIN — POLYETHYLENE GLYCOL 3350 17 G: 17 POWDER, FOR SOLUTION ORAL at 10:33

## 2021-12-31 RX ADMIN — KETOROLAC TROMETHAMINE 30 MG: 30 INJECTION, SOLUTION INTRAMUSCULAR; INTRAVENOUS at 13:42

## 2021-12-31 RX ADMIN — ENOXAPARIN SODIUM 40 MG: 100 INJECTION SUBCUTANEOUS at 13:43

## 2021-12-31 RX ADMIN — FAMOTIDINE 40 MG: 20 TABLET ORAL at 08:57

## 2021-12-31 RX ADMIN — Medication 10 ML: at 13:43

## 2021-12-31 RX ADMIN — KETOROLAC TROMETHAMINE 30 MG: 30 INJECTION, SOLUTION INTRAMUSCULAR; INTRAVENOUS at 20:10

## 2021-12-31 RX ADMIN — Medication 1 AMPULE: at 20:13

## 2021-12-31 RX ADMIN — Medication 10 ML: at 05:45

## 2021-12-31 RX ADMIN — OXYCODONE 5 MG: 5 TABLET ORAL at 17:50

## 2021-12-31 RX ADMIN — OXYCODONE 5 MG: 5 TABLET ORAL at 23:05

## 2021-12-31 RX ADMIN — OXYCODONE 5 MG: 5 TABLET ORAL at 02:32

## 2021-12-31 NOTE — PROGRESS NOTES
Admit Date: 2021    POD 1 Day Post-Op    Procedure:  Procedure(s):  ROBOTIC ASSISTED LAPAROSCOPIC INCISIONAL HERNIA REPAIR  WITH MESH, messenteric biopsy    Subjective:     Patient has complaints of pain. No flatus or BM yet. Requesting laxative. Objective:     Blood pressure 117/78, pulse 70, temperature 98.1 °F (36.7 °C), resp. rate 16, height 5' 3\" (1.6 m), weight 209 lb 3.5 oz (94.9 kg), last menstrual period 2014, SpO2 100 %. Temp (24hrs), Av.1 °F (36.7 °C), Min:97 °F (36.1 °C), Max:98.8 °F (37.1 °C)      Physical Exam:  GENERAL: alert, cooperative, no distress, appears stated age, LUNG: clear to auscultation bilaterally, HEART: regular rate and rhythm, ABDOMEN: soft, obese, wounds c/d/i, EXTREMITIES:  extremities normal, atraumatic, no cyanosis or edema    Labs: No results found for this or any previous visit (from the past 24 hour(s)).     Data Review images and reports reviewed    Assessment:     Active Problems:    Incisional hernia (2021)        Plan/Recommendations/Medical Decision Making:     Continue present treatment   Add miralax daily  Home when pain controlled and return of bowel function    Blanco Card MD  [facility]

## 2021-12-31 NOTE — PROGRESS NOTES
End of Shift Note    Bedside shift change report given to Antonella (oncoming nurse) by Ashlee Paz RN (offgoing nurse). Report included the following information SBAR and Kardex    Shift worked:  day     Shift summary and any significant changes:     ambulated in the hallway,      Concerns for physician to address:  none     Zone phone for oncoming shift:   [phone]       Activity:  Activity Level: Up with Assistance  Number times ambulated in hallways past shift: 5  Number of times OOB to chair past shift: 1    Cardiac:   Cardiac Monitoring: No      Cardiac Rhythm: Sinus Rhythm    Access:   Current line(s): PIV     Genitourinary:   Urinary status: voiding    Respiratory:   O2 Device: Nasal cannula  Chronic home O2 use?: NO  Incentive spirometer at bedside: NO  Actual Volume (ml): 1500 ml  GI:  Last Bowel Movement Date: 12/30/21  Current diet:  ADULT DIET Regular  Passing flatus: NO  Tolerating current diet: YES       Pain Management:   Patient states pain is manageable on current regimen: YES    Skin:  Carroll Score: 23  Interventions: increase time out of bed and limit briefs    Patient Safety:  Fall Score:  Total Score: 0  Interventions: gripper socks       Length of Stay:  Expected LOS: - - -  Actual LOS: 0      Ashlee Paz RN

## 2021-12-31 NOTE — PROGRESS NOTES
Ambulated the length of the hallway three times, tolerated well. Sitting up in the chair. Tolerated breakfast well.  Will continue to monitor

## 2021-12-31 NOTE — PROGRESS NOTES
End of Shift Note    Bedside shift change report given to Shakeel Horne RN (oncoming nurse) by Seth Johnson LPN (offgoing nurse). Report included the following information SBAR, Kardex, Procedure Summary, Intake/Output, MAR and Recent Results    Shift worked:  7p-7:30a     Shift summary and any significant changes:          Concerns for physician to address:       Zone phone for oncoming shift:   [phone]       Activity:  Activity Level: Up with Assistance  Number times ambulated in hallways past shift: 0  Number of times OOB to chair past shift: 0    Cardiac:   Cardiac Monitoring: No      Cardiac Rhythm: Sinus Rhythm    Access:   Current line(s): PIV     Genitourinary:   Urinary status: voiding    Respiratory:   O2 Device: Nasal cannula  Chronic home O2 use?: NO  Incentive spirometer at bedside: YES     GI:  Last Bowel Movement Date: 12/30/21  Current diet:  ADULT DIET Clear Liquid  ADULT DIET Regular  Passing flatus: YES  Tolerating current diet: YES       Pain Management:   Patient states pain is manageable on current regimen: YES    Skin:  Carroll Score: 23  Interventions: increase time out of bed and nutritional support     Patient Safety:  Fall Score:  Total Score: 1  Interventions: assistive device (walker, cane, etc), gripper socks, pt to call before getting OOB and stay with me (per policy)       Length of Stay:  Expected LOS: - - -  Actual LOS: 0      Seth Johnson LPN

## 2021-12-31 NOTE — PROGRESS NOTES
Bedside and Verbal shift change report given to Guy Mohan RN (oncoming nurse) by Zoila Cash RN (offgoing nurse). Report included the following information SBAR, Kardex, OR Summary, MAR, Accordion and Recent Results.

## 2022-01-01 VITALS
WEIGHT: 209.22 LBS | SYSTOLIC BLOOD PRESSURE: 116 MMHG | HEIGHT: 63 IN | HEART RATE: 69 BPM | RESPIRATION RATE: 17 BRPM | BODY MASS INDEX: 37.07 KG/M2 | OXYGEN SATURATION: 99 % | DIASTOLIC BLOOD PRESSURE: 84 MMHG | TEMPERATURE: 97.7 F

## 2022-01-01 PROCEDURE — 96372 THER/PROPH/DIAG INJ SC/IM: CPT

## 2022-01-01 PROCEDURE — 74011250637 HC RX REV CODE- 250/637: Performed by: SURGERY

## 2022-01-01 PROCEDURE — 96376 TX/PRO/DX INJ SAME DRUG ADON: CPT

## 2022-01-01 PROCEDURE — 74011250636 HC RX REV CODE- 250/636: Performed by: SURGERY

## 2022-01-01 PROCEDURE — G0378 HOSPITAL OBSERVATION PER HR: HCPCS

## 2022-01-01 RX ADMIN — BUPROPION HYDROCHLORIDE 150 MG: 150 TABLET, EXTENDED RELEASE ORAL at 08:03

## 2022-01-01 RX ADMIN — POLYETHYLENE GLYCOL 3350 17 G: 17 POWDER, FOR SOLUTION ORAL at 08:03

## 2022-01-01 RX ADMIN — Medication 1 AMPULE: at 08:02

## 2022-01-01 RX ADMIN — OXYCODONE 5 MG: 5 TABLET ORAL at 05:25

## 2022-01-01 RX ADMIN — Medication 10 ML: at 05:28

## 2022-01-01 RX ADMIN — KETOROLAC TROMETHAMINE 30 MG: 30 INJECTION, SOLUTION INTRAMUSCULAR; INTRAVENOUS at 02:50

## 2022-01-01 RX ADMIN — HYDROCHLOROTHIAZIDE 25 MG: 25 TABLET ORAL at 08:03

## 2022-01-01 RX ADMIN — OXYCODONE 5 MG: 5 TABLET ORAL at 11:01

## 2022-01-01 RX ADMIN — FAMOTIDINE 40 MG: 20 TABLET ORAL at 08:03

## 2022-01-01 NOTE — DISCHARGE SUMMARY
DISCHARGE SUMMARY      Patient ID:  Felipa January  [id number]  79 y.o.  1960    Admit date: 12/30/2021    Discharge date and time: No discharge date for patient encounter. Admitting Physician: Glen West MD     Discharge Physician: Stefano Pelaez MD      Admission Diagnoses: Incisional hernia, without obstruction or gangrene [K43.2]; Incisional hernia [K43.2]    Discharge Diagnoses: Active Problems:    Incisional hernia (12/30/2021)        Procedures: Procedure(s):  ROBOTIC ASSISTED LAPAROSCOPIC INCISIONAL HERNIA REPAIR  WITH MESH, messenteric biopsy    Consults: none        Hospital Course:   Patient admited for elective incisional hernia repair. She had a mild ileus and poor pain control post-operatively. By day two her pain was much improved. She was less bloated, passing flatus and tolerating a diet. She had a BM prior to discharge. D/C Disposition:  stable    Patient Instructions:   Current Discharge Medication List      START taking these medications    Details   oxyCODONE-acetaminophen (PERCOCET) 5-325 mg per tablet Take 1 Tablet by mouth every six (6) hours as needed for Pain for up to 5 days. Max Daily Amount: 4 Tablets. Qty: 20 Tablet, Refills: 0    Associated Diagnoses: Incisional hernia, without obstruction or gangrene      ibuprofen (MOTRIN) 800 mg tablet Take 1 Tablet by mouth every eight (8) hours as needed for Pain for up to 10 days. Qty: 30 Tablet, Refills: 0      polyethylene glycol (MIRALAX) 17 gram packet Take 1 Packet by mouth two (2) times a day for 30 days. Stop if having diarrhea  Qty: 60 Packet, Refills: 0         CONTINUE these medications which have NOT CHANGED    Details   famotidine (PEPCID) 40 mg tablet Take 1 Tab by mouth.      bran-gum-fib-celina-psyl-kelp-pec (Fiber 6) 1,000 mg tab as directed      buPROPion XL (WELLBUTRIN XL) 150 mg tablet Take 1 Tab by mouth. hydroCHLOROthiazide (HYDRODIURIL) 25 mg tablet Take 25 mg by mouth daily. B.infantis-B.ani-B.long-B.bifi (PROBIOTIC 4X) 10-15 mg TbEC Take 10-15 mg by mouth daily. Diet: resume pre-hospital diet    Activities:    Walk regularly. No lifting more than 10 pounds for 6 weeks. Light aerobic activity is okay when you feel up to it. You may resume driving in five days unless still requiring narcotics for pain. Work:    You may return to work in 2 or 3 weeks to light activity. No lifting more than 10 pounds for 6 weeks.         Follow-up with Dr. Francois Cardenas    Call for appointment for follow up in 2 weeks [phone]      Signed:  Gucci Jean Baptiste MD  1/1/2022  11:13 AM

## 2022-01-01 NOTE — PROGRESS NOTES
Problem: Falls - Risk of  Goal: *Absence of Falls  Description: Document Chely Nap Fall Risk and appropriate interventions in the flowsheet.   1/1/2022 0247 by Kami Blanco, RN  Outcome: Progressing Towards Goal  Note: Fall Risk Interventions:            Medication Interventions: Evaluate medications/consider consulting pharmacy,Patient to call before getting OOB    Elimination Interventions: Call light in reach,Patient to call for help with toileting needs           1/1/2022 0246 by Kami Blanco, RN  Outcome: Progressing Towards Goal  Note: Fall Risk Interventions:            Medication Interventions: Evaluate medications/consider consulting pharmacy,Patient to call before getting OOB    Elimination Interventions: Call light in reach,Patient to call for help with toileting needs

## 2022-01-01 NOTE — DISCHARGE INSTRUCTIONS
Discharge Instructions:  Hernia Repair    Dr. Francois Cardenas    Call for appointment for follow up in 2 weeks [phone]    Activity:    Walk regularly. No lifting more than 10 pounds for 6 weeks. Light aerobic activity is okay when you feel up to it. You may resume driving in five days unless still requiring narcotics for pain. Work:    You may return to work in 2 or 3 weeks to light activity. No lifting more than 10 pounds for 6 weeks. Diet:    You may resume normal diet after 24 hours. Anesthesia and narcotics may cause nausea and vomiting. If persistent please call the office. Wound Care: You have a special dressing called Dermabond. It is okay to shower and let the water run over the incisions but do not scrub the area or soak in a tub. If you have a small amount of drainage you may place a dry bandage over the wound and change it daily. If you experience a lot of drainage, develop redness around the wound, or a fever over 101 F occurs please call the office. Use ice over incision for comfort for 20 minutes every 1-2 hours to reduce swelling and discomfort. Wear abdominal binder at all times for 3 weeks (ok to remove when showering). Continue to wear binder when out of bed for an additional 3 weeks as well (total 6). Medications:    Resume home medications as indicated on the Medical Reconciliation form. Aspirin and Coumadin can be restarted immediately if you were taking them preoperatively. If taking Plavix do not restart it until post operative day 2. Pain medications:  Non steroidal antiinflammatories seem to work best for post surgical pain. Try these first as prescribed. A narcotic prescription will also be given for breakthrough pain. Over the counter stool softeners and laxatives may be used if needed. Do not hesitate to call with questions or concerns.

## 2022-01-01 NOTE — PROGRESS NOTES
DISCHARGE NOTE FROM [facility]    Patient determined to be stable for discharge by attending provider. I have reviewed the discharge instructions with the patient. They verbalized understanding and all questions were answered to their satisfaction. No complaints or further questions were expressed. Medications sent to pharmacy. Appropriate educational materials and medication side effect teaching were provided. PIV were removed prior to discharge. Patient did not discharge with any line, gabriel, or drain.      Personal items and valuables accounted for at discharge by patient and/or family: Melisa Smith

## 2022-01-03 ENCOUNTER — TELEPHONE (OUTPATIENT)
Dept: SURGERY | Age: 62
End: 2022-01-03

## 2022-01-14 ENCOUNTER — OFFICE VISIT (OUTPATIENT)
Dept: SURGERY | Age: 62
End: 2022-01-14
Payer: COMMERCIAL

## 2022-01-14 VITALS
DIASTOLIC BLOOD PRESSURE: 80 MMHG | HEART RATE: 85 BPM | WEIGHT: 208 LBS | RESPIRATION RATE: 16 BRPM | SYSTOLIC BLOOD PRESSURE: 124 MMHG | BODY MASS INDEX: 36.86 KG/M2 | HEIGHT: 63 IN | OXYGEN SATURATION: 99 % | TEMPERATURE: 97.5 F

## 2022-01-14 DIAGNOSIS — Z09 POSTOPERATIVE EXAMINATION: Primary | ICD-10-CM

## 2022-01-14 PROCEDURE — 99024 POSTOP FOLLOW-UP VISIT: CPT | Performed by: SURGERY

## 2022-01-14 NOTE — PROGRESS NOTES
Identified pt with two pt identifiers(name and ). Reviewed record in preparation for visit and have obtained necessary documentation. All patient medications has been reviewed. Chief Complaint   Patient presents with    Surgical Follow-up     ROBOTIC ASSISTED LAPAROSCOPIC INCISIONAL HERNIA REPAIR  WITH MESH, messenteric biopsy        Health Maintenance Due   Topic    Hepatitis C Screening     Lipid Screen     Shingrix Vaccine Age 50> (1 of 2)    Breast Cancer Screen Mammogram     Flu Vaccine (1)       Vitals:    22 0845   BP: 124/80   Pulse: 85   Resp: 16   Temp: 97.5 °F (36.4 °C)   TempSrc: Temporal   SpO2: 99%   Weight: 94.3 kg (208 lb)   Height: 5' 3\" (1.6 m)   LMP: 2014       4. Have you been to the ER, urgent care clinic since your last visit? Hospitalized since your last visit? No    5. Have you seen or consulted any other health care providers outside of the [address] [place] [address] since your last visit? Include any pap smears or colon screening. No      Patient is accompanied by self I have received verbal consent from Jerry Taylor to discuss any/all medical information while they are present in the room.

## 2022-01-14 NOTE — LETTER
NOTIFICATION OF RETURN TO WORK / SCHOOL    1/14/2022 9:03 AM    Ms. Eason Kay [address] [phone]      To Whom It May Concern:      Cj Jha was under the care of [address] [place] [address]     She will be able to return to work on January 17, 2022 on light duty. She may not lift anything over 10     pounds. The patient may return to full duty on February 14, 2022. If there are questions or concerns please have the patient contact our office.     Sincerely,      Lindy Covarrubias MD

## 2022-01-14 NOTE — PROGRESS NOTES
Surgery  Follow up  Procedure: RA lap incisional hernia repair with mesh, partial omentectomy and mesenteric biopsy  OR date:  12/30/2021  Path:    1. Soft tissue, submitted as up with mesenteric mass\", excision:        Mature adipose with fat necrosis. One lymph node negative for morphologic or immunophenotypic abnormality. 2. Omentum, partial omentectomy:        Mature adipose with fat necrosis and dystrophic calcification.      S I feel fine but some soreness, no diarrhea    Visit Vitals  /80 (BP 1 Location: Left upper arm, BP Patient Position: Sitting, BP Cuff Size: Adult)   Pulse 85   Temp 97.5 °F (36.4 °C) (Temporal)   Resp 16   Ht 5' 3\" (1.6 m)   Wt 94.3 kg (208 lb)   LMP 08/26/2014   SpO2 99%   BMI 36.85 kg/m²       O Incisions healing well without infection   No signs of hernia    A/P Doing well   No lifting more than 10 pounds x 4 weeks   RTW 1/17 to light duty full duties on 2/14   RTC 6 weeks or prn    Clary Gutierrez MD FACS

## 2022-01-14 NOTE — LETTER
1/14/2022    Patient: Cottie Felty   YOB: 1960   Date of Visit: 1/14/2022     Theresa Gaffney, [address]   [address] [address]  Via Fax: [phone]     Frederich Halsted, [address] [place] [address] [address] [id number]  Via Fax: [phone]    Dear Vernetta Retort, MD Frederich Halsted, MD,      Thank you for referring Ms. Therese Mack to [address] [place], [place] [address]j[facility]ormick for evaluation. My notes for this consultation are attached. If you have questions, please do not hesitate to call me. I look forward to following your patient along with you.       Sincerely,    Shannen Hood MD

## 2022-01-28 ENCOUNTER — OFFICE VISIT (OUTPATIENT)
Dept: PRIMARY CARE CLINIC | Age: 62
End: 2022-01-28

## 2022-01-28 VITALS
RESPIRATION RATE: 18 BRPM | DIASTOLIC BLOOD PRESSURE: 86 MMHG | SYSTOLIC BLOOD PRESSURE: 120 MMHG | HEIGHT: 63 IN | OXYGEN SATURATION: 98 % | TEMPERATURE: 97 F | WEIGHT: 212.6 LBS | HEART RATE: 96 BPM | BODY MASS INDEX: 37.67 KG/M2

## 2022-01-28 DIAGNOSIS — I10 HYPERTENSION, UNSPECIFIED TYPE: ICD-10-CM

## 2022-01-28 DIAGNOSIS — H92.02 OTALGIA OF LEFT EAR: ICD-10-CM

## 2022-01-28 DIAGNOSIS — H66.002 NON-RECURRENT ACUTE SUPPURATIVE OTITIS MEDIA OF LEFT EAR WITHOUT SPONTANEOUS RUPTURE OF TYMPANIC MEMBRANE: Primary | ICD-10-CM

## 2022-01-28 PROBLEM — R53.83 FATIGUE: Status: ACTIVE | Noted: 2018-01-09

## 2022-01-28 PROBLEM — K21.9 GASTROESOPHAGEAL REFLUX DISEASE: Status: ACTIVE | Noted: 2020-10-28

## 2022-01-28 PROBLEM — M47.817 LUMBOSACRAL SPONDYLOSIS WITHOUT MYELOPATHY: Status: ACTIVE | Noted: 2017-10-20

## 2022-01-28 PROBLEM — M54.32 SCIATICA OF LEFT SIDE: Status: ACTIVE | Noted: 2017-10-20

## 2022-01-28 PROBLEM — R00.2 PALPITATIONS: Status: ACTIVE | Noted: 2022-01-28

## 2022-01-28 PROBLEM — C64.9 RENAL CELL CARCINOMA (HCC): Status: ACTIVE | Noted: 2020-10-28

## 2022-01-28 PROBLEM — G47.33 OBSTRUCTIVE SLEEP APNEA SYNDROME: Status: ACTIVE | Noted: 2020-10-28

## 2022-01-28 PROCEDURE — 99213 OFFICE O/P EST LOW 20 MIN: CPT | Performed by: NURSE PRACTITIONER

## 2022-01-28 RX ORDER — AMOXICILLIN AND CLAVULANATE POTASSIUM 875; 125 MG/1; MG/1
1 TABLET, FILM COATED ORAL 2 TIMES DAILY
Qty: 14 TABLET | Refills: 0 | Status: SHIPPED | OUTPATIENT
Start: 2022-01-28 | End: 2022-02-04

## 2022-01-28 NOTE — PROGRESS NOTES
HISTORY OF PRESENT ILLNESS  John Locke is a 63 y.o. female. Patient with a 2 day history of left ear pain. Described as Rates a 6-8/10. Woke her from sleep 2 night ago. Fairly constant. Denies  History of ear infections. No fever, congestion, headache, dizziness, drainage, hearing loss. Used old ear drops from 2 yrs ago. Left side of neck painful. Blood pressure elevated. Patient reports taking medication before office visit. Usually WNL      Ear Pain   The history is provided by the patient. This is a new problem. The current episode started 2 days ago. Patient complains that the left ear is affected. There has been no fever. The pain is at a severity of 8/10. The pain is moderate. Pertinent negatives include no headaches, no hearing loss, no sore throat, no diarrhea, no vomiting, no cough and no rash. Her past medical history does not include chronic ear infection. Past Medical History:   Diagnosis Date    Chronic kidney disease     Chronic pain     sciatic nerve-left    COVID     --hospitalized pneumonia    Diverticulosis     GERD (gastroesophageal reflux disease)     Hypertension     Renal carcinoma, right ([address]     kidney    Sleep apnea     cpap     Past Surgical History:   Procedure Laterality Date    HX CERVICAL DISKECTOMY      HX  SECTION   and     HX GYN      ablation    HX GYN  2015    hysterectomy    HX HERNIA REPAIR  2021    ROBOTIC ASSISTED LAPAROSCOPIC INCISIONAL HERNIA REPAIR  WITH MESH, messenteric biopsy     HX LAP CHOLECYSTECTOMY  1996    HX NEPHRECTOMY Right 2018    HX ORTHOPAEDIC      Neck Surgery.  HX ORTHOPAEDIC Left     knee surgery x 3    HX ROTATOR CUFF REPAIR Right     HX TUMOR REMOVAL  2018    R Kidney     ME ABDOMEN SURGERY PROC UNLISTED      colon resection         Review of Systems   Constitutional: Negative for chills, fever and malaise/fatigue. HENT: Positive for ear pain.  Negative for congestion, hearing loss, sinus pain, sore throat and tinnitus. Eyes: Negative for redness. Respiratory: Negative for cough. Gastrointestinal: Negative for diarrhea and vomiting. Skin: Negative for rash. Neurological: Negative for headaches. All other systems reviewed and are negative. Physical Exam  Vitals and nursing note reviewed. Constitutional:       Appearance: Normal appearance. HENT:      Head: Normocephalic and atraumatic. Right Ear: Tympanic membrane, ear canal and external ear normal.      Left Ear: Tenderness present. A middle ear effusion is present. Tympanic membrane is scarred. Tympanic membrane has decreased mobility. Ears:      Comments: TM mild effusion. Non bulging. Pain with tragus movement     Nose: No rhinorrhea. Mouth/Throat:      Mouth: Mucous membranes are moist.      Pharynx: No oropharyngeal exudate or posterior oropharyngeal erythema. Eyes:      Pupils: Pupils are equal, round, and reactive to light. Cardiovascular:      Rate and Rhythm: Normal rate and regular rhythm. Pulses: Normal pulses. Heart sounds: Normal heart sounds. Pulmonary:      Effort: Pulmonary effort is normal.      Breath sounds: Normal breath sounds. Lymphadenopathy:      Head:      Left side of head: Posterior auricular adenopathy present. Cervical: Cervical adenopathy present. Left cervical: Superficial cervical adenopathy present. Comments: Mildly enlarged and tender  lateral cervical lymph nodes. Skin:     General: Skin is warm. Neurological:      Mental Status: She is alert. Psychiatric:         Mood and Affect: Mood normal.       Blood pressure repeated manually. 120/86. ASSESSMENT and PLAN    ICD-10-CM ICD-9-CM    1. Non-recurrent acute suppurative otitis media of left ear without spontaneous rupture of tympanic membrane  H66.002 382.00    2. Otalgia of left ear  H92.02 388.70    3.  Hypertension, unspecified type  I10 401.9      Encounter Diagnoses Name Primary?  Non-recurrent acute suppurative otitis media of left ear without spontaneous rupture of tympanic membrane Yes    Otalgia of left ear     Hypertension, unspecified type      Orders Placed This Encounter    amoxicillin-clavulanate (AUGMENTIN) 875-125 mg per tablet   Medications as directed  Take with food. Complete entire course of prescription. Follow plan of care as discussed. Nl BP manually. Ibuprofen/ tylenol/ warm compresses for pain relief.    Signed By: PEARL Rosales     January 28, 2022

## 2022-01-28 NOTE — PATIENT INSTRUCTIONS
Earache: Care Instructions  Your Care Instructions     Even though infection is a common cause of ear pain, not all ear pain means an infection. If you have ear pain and don't have an infection, it could be because of a jaw problem, such as temporomandibular joint (TMJ) pain. Or it could be because of a neck problem. When ear discomfort or pain is mild or comes and goes without other symptoms, home treatment may be all you need. Follow-up care is a key part of your treatment and safety. Be sure to make and go to all appointments, and call your doctor if you are having problems. It's also a good idea to know your test results and keep a list of the medicines you take. How can you care for yourself at home? · Apply heat on the ear to ease pain. To apply heat, put a warm water bottle, a heating pad set on low, or a warm cloth on your ear. Do not go to sleep with a heating pad on your skin. · Take an over-the-counter pain medicine, such as acetaminophen (Tylenol), ibuprofen (Advil, Motrin), or naproxen (Aleve). Be safe with medicines. Read and follow all instructions on the label. · Do not take two or more pain medicines at the same time unless the doctor told you to. Many pain medicines have acetaminophen, which is Tylenol. Too much acetaminophen (Tylenol) can be harmful. · Never insert anything, such as a cotton swab or a carlo pin, into the ear. When should you call for help? Call your doctor now or seek immediate medical care if:    · You have new or worse symptoms of infection, such as:  ? Increased pain, swelling, warmth, or redness. ? Red streaks leading from the area. ? Pus draining from the area. ? A fever. Watch closely for changes in your health, and be sure to contact your doctor if:    · You have new or worse discharge coming from the ear.     · You do not get better as expected. Where can you learn more?   Go to http://www.gray.com/  Enter E616 in the search box to learn more about \"Earache: Care Instructions. \"  Current as of: December 2, 2020               Content Version: 13.0  © 8920-5131 [employer]. Care instructions adapted under license by [employer] (which disclaims liability or warranty for this information). If you have questions about a medical condition or this instruction, always ask your healthcare professional. [place] [address] any warranty or liability for your use of this information.

## 2022-03-18 PROBLEM — U07.1 PNEUMONIA DUE TO COVID-19 VIRUS: Status: ACTIVE | Noted: 2021-01-05

## 2022-03-18 PROBLEM — J12.82 PNEUMONIA DUE TO COVID-19 VIRUS: Status: ACTIVE | Noted: 2021-01-05

## 2022-03-18 PROBLEM — R00.2 PALPITATIONS: Status: ACTIVE | Noted: 2022-01-28

## 2022-03-18 PROBLEM — G47.33 OBSTRUCTIVE SLEEP APNEA SYNDROME: Status: ACTIVE | Noted: 2020-10-28

## 2022-03-18 PROBLEM — K43.2 INCISIONAL HERNIA, WITHOUT OBSTRUCTION OR GANGRENE: Status: ACTIVE | Noted: 2019-02-27

## 2022-03-18 PROBLEM — R74.01 TRANSAMINITIS: Status: ACTIVE | Noted: 2021-01-05

## 2022-03-19 PROBLEM — R53.83 FATIGUE: Status: ACTIVE | Noted: 2018-01-09

## 2022-03-19 PROBLEM — M47.817 LUMBOSACRAL SPONDYLOSIS WITHOUT MYELOPATHY: Status: ACTIVE | Noted: 2017-10-20

## 2022-03-19 PROBLEM — K21.9 GASTROESOPHAGEAL REFLUX DISEASE: Status: ACTIVE | Noted: 2020-10-28

## 2022-03-19 PROBLEM — N17.9 AKI (ACUTE KIDNEY INJURY) (HCC): Status: ACTIVE | Noted: 2021-01-05

## 2022-03-19 PROBLEM — K43.2 INCISIONAL HERNIA: Status: ACTIVE | Noted: 2021-12-30

## 2022-03-19 PROBLEM — M54.32 SCIATICA OF LEFT SIDE: Status: ACTIVE | Noted: 2017-10-20

## 2022-03-19 PROBLEM — J96.01 ACUTE RESPIRATORY FAILURE WITH HYPOXIA (HCC): Status: ACTIVE | Noted: 2021-01-05

## 2022-03-19 PROBLEM — I10 ESSENTIAL HYPERTENSION: Status: ACTIVE | Noted: 2022-01-28

## 2022-03-19 PROBLEM — E66.01 SEVERE OBESITY WITH BODY MASS INDEX (BMI) OF 35.0 TO 39.9 WITH SERIOUS COMORBIDITY (HCC): Status: ACTIVE | Noted: 2018-09-28

## 2022-03-19 PROBLEM — C64.9 RENAL CELL CARCINOMA (HCC): Status: ACTIVE | Noted: 2020-10-28

## 2022-03-28 ENCOUNTER — TRANSCRIBE ORDER (OUTPATIENT)
Dept: SCHEDULING | Age: 62
End: 2022-03-28

## 2022-03-28 DIAGNOSIS — M54.32 LEFT SIDED SCIATICA: ICD-10-CM

## 2022-03-28 DIAGNOSIS — M54.16 LUMBAR RADICULOPATHY: ICD-10-CM

## 2022-03-28 DIAGNOSIS — M51.36 DDD (DEGENERATIVE DISC DISEASE), LUMBAR: Primary | ICD-10-CM

## 2022-04-13 ENCOUNTER — HOSPITAL ENCOUNTER (OUTPATIENT)
Dept: MRI IMAGING | Age: 62
Discharge: HOME | End: 2022-04-13
Payer: COMMERCIAL

## 2022-04-13 DIAGNOSIS — M54.16 LUMBAR RADICULOPATHY: ICD-10-CM

## 2022-04-13 DIAGNOSIS — M54.32 LEFT SIDED SCIATICA: ICD-10-CM

## 2022-04-13 DIAGNOSIS — M51.36 DDD (DEGENERATIVE DISC DISEASE), LUMBAR: ICD-10-CM

## 2022-04-13 PROCEDURE — 72148 MRI LUMBAR SPINE W/O DYE: CPT | Performed by: NURSE PRACTITIONER

## 2022-05-17 ENCOUNTER — OFFICE VISIT (OUTPATIENT)
Dept: PRIMARY CARE CLINIC | Age: 62
End: 2022-05-17

## 2022-05-17 VITALS
HEIGHT: 63 IN | WEIGHT: 214.4 LBS | SYSTOLIC BLOOD PRESSURE: 123 MMHG | TEMPERATURE: 97.8 F | HEART RATE: 87 BPM | DIASTOLIC BLOOD PRESSURE: 85 MMHG | BODY MASS INDEX: 37.99 KG/M2 | RESPIRATION RATE: 16 BRPM | OXYGEN SATURATION: 97 %

## 2022-05-17 DIAGNOSIS — J40 BRONCHITIS: Primary | ICD-10-CM

## 2022-05-17 DIAGNOSIS — J06.9 UPPER RESPIRATORY INFECTION WITH COUGH AND CONGESTION: ICD-10-CM

## 2022-05-17 DIAGNOSIS — B34.9 VIRAL ILLNESS: ICD-10-CM

## 2022-05-17 LAB — SARS-COV-2 PCR, POC: NEGATIVE

## 2022-05-17 PROCEDURE — 99214 OFFICE O/P EST MOD 30 MIN: CPT | Performed by: NURSE PRACTITIONER

## 2022-05-17 PROCEDURE — 87635 SARS-COV-2 COVID-19 AMP PRB: CPT | Performed by: NURSE PRACTITIONER

## 2022-05-17 RX ORDER — METHYLPREDNISOLONE 4 MG/1
TABLET ORAL
Qty: 1 DOSE PACK | Refills: 0 | Status: SHIPPED | OUTPATIENT
Start: 2022-05-17

## 2022-05-17 RX ORDER — TERBINAFINE HYDROCHLORIDE 250 MG/1
250 TABLET ORAL DAILY
COMMUNITY
Start: 2022-03-09

## 2022-05-17 RX ORDER — AZITHROMYCIN 250 MG/1
250 TABLET, FILM COATED ORAL SEE ADMIN INSTRUCTIONS
Qty: 6 TABLET | Refills: 0 | Status: SHIPPED | OUTPATIENT
Start: 2022-05-17 | End: 2022-05-22

## 2022-05-17 NOTE — PATIENT INSTRUCTIONS
Bronchitis: Care Instructions  Your Care Instructions     Bronchitis is inflammation of the bronchial tubes, which carry air to the lungs. The tubes swell and produce mucus, or phlegm. The mucus and inflamed bronchial tubes make you cough. You may have trouble breathing. Most cases of bronchitis are caused by viruses like those that cause colds. Antibiotics usually do not help and they may be harmful. Bronchitis usually develops rapidly and lasts about 2 to 3 weeks in otherwise healthy people. Follow-up care is a key part of your treatment and safety. Be sure to make and go to all appointments, and call your doctor if you are having problems. It's also a good idea to know your test results and keep a list of the medicines you take. How can you care for yourself at home? · Take all medicines exactly as prescribed. Call your doctor if you think you are having a problem with your medicine. · Get some extra rest.  · Take an over-the-counter pain medicine, such as acetaminophen (Tylenol), ibuprofen (Advil, Motrin), or naproxen (Aleve) to reduce fever and relieve body aches. Read and follow all instructions on the label. · Do not take two or more pain medicines at the same time unless the doctor told you to. Many pain medicines have acetaminophen, which is Tylenol. Too much acetaminophen (Tylenol) can be harmful. · Take an over-the-counter cough medicine to help quiet a dry, hacking cough so that you can sleep. Avoid cough medicines that have more than one active ingredient. Read and follow all instructions on the label. · Do not smoke. Smoking can make bronchitis worse. If you need help quitting, talk to your doctor about stop-smoking programs and medicines. These can increase your chances of quitting for good. When should you call for help? Call 911 anytime you think you may need emergency care. For example, call if:    · You have severe trouble breathing.    Call your doctor now or seek immediate medical care if:    · You have new or worse trouble breathing.     · You cough up dark brown or bloody mucus (sputum).     · You have a new or higher fever.     · You have a new rash. Watch closely for changes in your health, and be sure to contact your doctor if:    · You cough more deeply or more often, especially if you notice more mucus or a change in the color of your mucus.     · You are not getting better as expected. Where can you learn more? Go to http://www.gray.com/  Enter H333 in the search box to learn more about \"Bronchitis: Care Instructions. \"  Current as of: July 6, 2021               Content Version: 13.2  © 2006-2022 [employer]. Care instructions adapted under license by [employer] (which disclaims liability or warranty for this information). If you have questions about a medical condition or this instruction, always ask your healthcare professional. [address] any warranty or liability for your use of this information.

## 2022-05-17 NOTE — PROGRESS NOTES
HISTORY OF PRESENT ILLNESS  Liliana Segal is a 63 y.o. female. Patient with 1 week of cough and congestion and severe cough. Has a few weeks. Feels achey. Had 1 yr ago pneumonia. Woke   Today with  Ear pressure. Headache is severe, Pain in face. Burning cough. Thick yellow mucous. Non smoker. No asthma or allergies, No wheeze or SOB. Taking zyrtec, sudafed. Nothing for cough. \"Has tried everything. \"   Vaccinated for COVID    Past Medical History:   Diagnosis Date    Chronic kidney disease     Chronic pain     sciatic nerve-left    COVID     --hospitalized pneumonia    Diverticulosis     GERD (gastroesophageal reflux disease)     Hypertension     Renal carcinoma, right ([address]     kidney    Sleep apnea     cpap     Past Surgical History:   Procedure Laterality Date    HX CERVICAL DISKECTOMY      HX  SECTION   and     HX GYN      ablation    HX GYN  2015    hysterectomy    HX HERNIA REPAIR  2021    ROBOTIC ASSISTED LAPAROSCOPIC INCISIONAL HERNIA REPAIR  WITH MESH, messenteric biopsy     HX LAP CHOLECYSTECTOMY  1996    HX NEPHRECTOMY Right 2018    HX ORTHOPAEDIC      Neck Surgery.  HX ORTHOPAEDIC Left     knee surgery x 3    HX ROTATOR CUFF REPAIR Right     HX TUMOR REMOVAL  2018    R Kidney     VT ABDOMEN SURGERY PROC UNLISTED      colon resection       Review of Systems   Constitutional: Positive for fever and malaise/fatigue. HENT: Positive for congestion, ear pain and sinus pain. Negative for sore throat. Eyes: Negative for redness. Respiratory: Positive for cough and sputum production. Negative for shortness of breath and wheezing. Gastrointestinal: Negative for abdominal pain, nausea and vomiting. Musculoskeletal: Positive for myalgias. Neurological: Positive for headaches. Negative for dizziness. Physical Exam  Vitals reviewed. Constitutional:       General: She is not in acute distress.      Appearance: She is obese.   HENT:      Head: Normocephalic and atraumatic. Left Ear: Tympanic membrane and ear canal normal.      Ears:      Comments: Right TM with effusion     Nose: Mucosal edema, congestion and rhinorrhea present. Rhinorrhea is clear. Right Turbinates: Enlarged and swollen. Left Turbinates: Enlarged and swollen. Right Sinus: Maxillary sinus tenderness present. Left Sinus: Maxillary sinus tenderness present. Mouth/Throat:      Mouth: Mucous membranes are moist.      Pharynx: Posterior oropharyngeal erythema present. Eyes:      Pupils: Pupils are equal, round, and reactive to light. Cardiovascular:      Rate and Rhythm: Normal rate and regular rhythm. Pulses: Normal pulses. Heart sounds: Normal heart sounds. Pulmonary:      Effort: Pulmonary effort is normal.      Breath sounds: No wheezing or rhonchi. Comments: Dry hacking cough throughout exam  Musculoskeletal:         General: Normal range of motion. Cervical back: Normal range of motion. Lymphadenopathy:      Cervical: No cervical adenopathy. Skin:     General: Skin is warm. Neurological:      General: No focal deficit present. Mental Status: She is alert. Psychiatric:         Mood and Affect: Mood normal.       Results for orders placed or performed in visit on 05/17/22   POCT COVID-19, SARS-COV-2, PCR   Result Value Ref Range    SARS-COV-2 PCR, POC Negative Negative     CXR pending  ASSESSMENT and PLAN    ICD-10-CM ICD-9-CM    1. Bronchitis  J40 490    2. Upper respiratory infection with cough and congestion  J06.9 465.9 XR CHEST PA LAT    rule out pneumonia   3. Viral illness  B34.9 079.99 POCT COVID-19, SARS-COV-2, PCR     Encounter Diagnoses   Name Primary?     Bronchitis Yes    Upper respiratory infection with cough and congestion     Comment: rule out pneumonia    Viral illness      Orders Placed This Encounter    XR CHEST PA LAT    POCT COVID-19, SARS-COV-2, PCR    terbinafine HCL (LAMISIL) 250 mg tablet    azithromycin (ZITHROMAX) 250 mg tablet    methylPREDNISolone (MEDROL DOSEPACK) 4 mg tablet   Medications as directed  Take with food. Complete entire course of prescription. Follow plan of care as discussed. I will call with CX ray result  [facility] for cough. Follow with PCP  Signed By: PEARL Coates     May 17, 2022      FINDINGS: PA and lateral radiographs of the chest demonstrate adequately  expanded lungs. Heart size is normal. There is no focal lung consolidation. The  vascular clarity is normal. There is no evidence of effusion or pneumothorax.      IMPRESSION  No acute cardiopulmonary findings.

## 2022-05-17 NOTE — PROGRESS NOTES
Chief Complaint   Patient presents with    Cold Symptoms     Pt reports about 1 week ago she started having a extreme cough. She explains that it is a dry and wet cough. Slight sore throat from the cough and right ear pain.       Visit Vitals  /85   Pulse 87   Temp 97.8 °F (36.6 °C)   Resp 16   Ht 5' 3\" (1.6 m)   Wt 214 lb 6.4 oz (97.3 kg)   SpO2 97%   BMI 37.98 kg/m²

## 2022-11-09 ENCOUNTER — TRANSCRIBE ORDER (OUTPATIENT)
Dept: REGISTRATION | Age: 62
End: 2022-11-09

## 2022-11-09 ENCOUNTER — HOSPITAL ENCOUNTER (OUTPATIENT)
Dept: GENERAL RADIOLOGY | Age: 62
Discharge: HOME | End: 2022-11-09
Payer: COMMERCIAL

## 2022-11-09 DIAGNOSIS — R14.0 ABDOMINAL BLOATING: ICD-10-CM

## 2022-11-09 DIAGNOSIS — R14.0 BLOATING SYMPTOM: ICD-10-CM

## 2022-11-09 DIAGNOSIS — K20.0 EOSINOPHILIC ESOPHAGITIS: ICD-10-CM

## 2022-11-09 DIAGNOSIS — R14.3 FLATULENCE SYMPTOM: ICD-10-CM

## 2022-11-09 DIAGNOSIS — Z86.010 PERSONAL HISTORY OF COLONIC POLYPS: ICD-10-CM

## 2022-11-09 DIAGNOSIS — R14.0 ABDOMINAL BLOATING: Primary | ICD-10-CM

## 2022-11-09 PROCEDURE — 74018 RADEX ABDOMEN 1 VIEW: CPT

## 2022-12-15 ENCOUNTER — TRANSCRIBE ORDER (OUTPATIENT)
Dept: REGISTRATION | Age: 62
End: 2022-12-15

## 2022-12-15 ENCOUNTER — HOSPITAL ENCOUNTER (OUTPATIENT)
Dept: GENERAL RADIOLOGY | Age: 62
Discharge: HOME | End: 2022-12-15
Payer: COMMERCIAL

## 2022-12-15 DIAGNOSIS — C64.9 RENAL CELL CARCINOMA (HCC): Primary | ICD-10-CM

## 2022-12-15 DIAGNOSIS — C64.9 RENAL CELL CARCINOMA (HCC): ICD-10-CM

## 2022-12-15 PROCEDURE — 71046 X-RAY EXAM CHEST 2 VIEWS: CPT

## 2023-04-03 ENCOUNTER — TELEPHONE (OUTPATIENT)
Dept: SLEEP MEDICINE | Age: 63
End: 2023-04-03

## 2023-04-03 ENCOUNTER — VIRTUAL VISIT (OUTPATIENT)
Dept: SLEEP MEDICINE | Age: 63
End: 2023-04-03
Payer: COMMERCIAL

## 2023-04-03 ENCOUNTER — OFFICE VISIT (OUTPATIENT)
Dept: SLEEP MEDICINE | Age: 63
End: 2023-04-03

## 2023-04-03 DIAGNOSIS — F41.9 ANXIETY AND DEPRESSION: ICD-10-CM

## 2023-04-03 DIAGNOSIS — G47.33 OSA (OBSTRUCTIVE SLEEP APNEA): Primary | ICD-10-CM

## 2023-04-03 DIAGNOSIS — F32.A ANXIETY AND DEPRESSION: ICD-10-CM

## 2023-04-03 DIAGNOSIS — I10 PRIMARY HYPERTENSION: ICD-10-CM

## 2023-04-03 PROCEDURE — 99203 OFFICE O/P NEW LOW 30 MIN: CPT | Performed by: INTERNAL MEDICINE

## 2023-04-03 NOTE — PROGRESS NOTES
[address] [facility] [address] [place]. [place], [address]  Tel.  [phone]  Fax. [address] [facility] [address]  [place], [address] [facility]  Tel.  [phone]  Fax. [phone] [address] [facility] Therese Webber  Tel.  [phone]  Fax. [phone] Michelle Gamboa is a 63y.o. year old female seen for evaluation of a sleep disorder. ASSESSMENT/PLAN:      ICD-10-CM ICD-9-CM    1. JULIEN (obstructive sleep apnea)  G47.33 327.23 SLEEP STUDY UNATTENDED, 4 CHANNEL      2. Anxiety and depression  F41.9 300.00     F32. A 311       3. Primary hypertension  I10 401.9       4. BMI 37.0-37.9, adult  Z68.37 V85.37           Patient has a history and examination consistent with the diagnosis of sleep apnea. Follow-up and Dispositions    Return for telephone follow-up after testing is completed. * The patient currently has a High Risk for having sleep apnea. STOP-BANG score 6.    * Sleep testing was ordered for initial evaluation. Orders Placed This Encounter    SLEEP STUDY UNATTENDED, 4 CHANNEL     Order Specific Question:   Reason for Exam     Answer:   JULIEN       * She was provided information on sleep apnea including corresponding risk factors and the importance of proper treatment. * Treatment options were reviewed in detail. She would like to proceed with PAP therapy (new device). Patient will be seen in follow-up in 6-8 weeks after PAP setup to gauge treatment response and adherence to therapy. * The patient was counseled regarding proper sleep hygiene, with emphasis on ensuring sufficient total sleep time; safe driving and the benefits of exercise and weight loss. * All of her questions were addressed. 2. Anxiety and Depression -  continue on current regimen - buPROPion XL (WELLBUTRIN XL) 150 mg tablet, she will continue to monitor her mood and follow up with her care provider for reevaluation/adjustment of medications if warranted.   I have reviewed the relationship between mood as it relates to sleep-disordered breathing. 3. Hypertension -  continue on current regimen - hydroCHLOROthiazide (HYDRODIURIL) 25 mg tablet, she will continue to monitor her BP and follow up with her primary care provider for reevaluation/adjustment of medications if warranted. I have reviewed the relationship between hypertension as it relates to sleep-disordered breathing. 4. Recommended a dedicated weight loss program through appropriate diet and exercise regimen as significant weight reduction has been shown to reduce severity of obstructive sleep apnea. SUBJECTIVE/OBJECTIVE:    Sabas Redding is an 63 y.o. female referred for evaluation for a sleep disorder. She complains of snoring associated with periods of not breathing, awakening in the middle of the night because of urination. Patient reports of poor sleep quality which is not restorative, she feels tired and fatigued during the day. Symptoms began several years ago, she was diagnosed with JULIEN and has been on PAP therapy (see media) since that time. He device malfunctioned and she is in need of new equipment. The Sleep Center that initially evaluated her has gone out of business. She usually can fall asleep in 10 minutes. Family or house members note snoring, periods of not breathing. She denies of symptoms indicative of cataplexy, sleep paralysis or sleep related hallucinations. She denies of a history of unusual movements occurring during sleep. Eliana Short (P) does wake up frequently at night. She (P) is bothered by waking up too early and left unable to get back to sleep. She actually sleeps about (P) 7 hours at night and wakes up about (P) 2 times during the night. She (P) does not work shifts:  .   Kristian Hernandez indicates she (P) does not get too little sleep at night. Her bedtime is (P) 2200. She awakens at (P) 0630. She (P) does not take naps.   She has the following observed behaviors: (P) Loud snoring, Pauses in breathing;  . Other remarks: The patient has undergone diagnostic testing for the current problems. Review of Systems:  Constitutional:  No significant weight loss or weight gain  Eyes:  No blurred vision  CVS:  No significant chest pain  Pulm:  No significant shortness of breath  GI:  No significant nausea or vomiting  :  + significant nocturia  Musculoskeletal:  No significant joint pain at night  Skin:  No significant rashes  Neuro:  No significant dizziness   Psych:  No active mood issues    Sleep Review of Systems: notable for Negative difficulty falling asleep; Positive awakenings at night; Positive perceived regular dreaming; Positive nightmares; Positive  early morning headaches; Negative  memory problems; Negative  concentration issues; Negative caffeine;  Negative alcohol;   Negative history of any automobile or occupational accidents due to daytime drowsiness. [place] Sleepiness Score: (P) 5   and Modified F.O.S.Q. Score Total / 2: (P) 16.5    Vitals reported by patient     Patient-Reported Vitals 4/3/2023   Patient-Reported Weight 199      Calculated BMI 37.98 kg/m2    Physical Exam completed by visual and auditory observation of patient with verbal input from patient. General:   Alert, oriented, not in acute distress   Eyes:  Anicteric Sclerae; no obvious strabismus   Nose:  No obvious nasal septum deviation    Neck:   Midline trachea, no visible mass   Chest/Lungs:  Respiratory effort normal, no visualized signs of difficulty breathing or respiratory distress   CVS:  No JVD   Extremities:  No obvious rashes noted on face, neck, or hands   Neuro:  No facial asymmetry, no focal deficits; no obvious tremor    Psych:  Normal affect,  normal countenance       Lizeth White, was evaluated through a synchronous (real-time) audio-video encounter. The patient (or guardian if applicable) is aware that this is a billable service, which includes applicable co-pays.  This Virtual Visit was conducted with patient's (and/or legal guardian's) consent. The visit was conducted pursuant to the emergency declaration under the [facility] [address] and the [employer] and [employer] General Act. Patient identification was verified, and a caregiver was present when appropriate. The patient was located at: Home: [address] [place] [address]  The provider was located at: Facility (Appt Department): [address] [place] [address]  Alessandra Dunn [phone]        Patient's phone number [phone] (cell) was confirmed for accuracy. She gives permission for messages regarding results and appointments to be left at that number. An electronic signature was used to authenticate this note. Rachel Maya MD, [facility]  Electronically signed.  04/03/23

## 2023-04-03 NOTE — PATIENT INSTRUCTIONS
[facility] Sathya. [place], [address]  Tel.  [phone]  Fax. 100 [facility] 60  [place], [address] [facility]  Tel.  [phone]  Fax. [phone] [address]  Tel.  [phone]  Fax. [phone]     Sleep Apnea: After Your Visit  Your Care Instructions  Sleep apnea occurs when you frequently stop breathing for 10 seconds or longer during sleep. It can be mild to severe, based on the number of times per hour that you stop breathing or have slowed breathing. Blocked or narrowed airways in your nose, mouth, or throat can cause sleep apnea. Your airway can become blocked when your throat muscles and tongue relax during sleep. Sleep apnea is common, occurring in 1 out of 20 individuals. Individuals having any of the following characteristics should be evaluated and treated right away due to high risk and detrimental consequences from untreated sleep apnea:  Obesity  Congestive Heart failure  Atrial Fibrillation  Uncontrolled Hypertension  Type II Diabetes  Night-time Arrhythmias  Stroke  Pulmonary Hypertension  High-risk Driving Populations (pilots, truck drivers, etc.)  Patients Considering Weight-loss Surgery    How do you know you have sleep apnea? You probably have sleep apnea if you answer 'yes' to 3 or more of the following questions:  S - Have you been told that you Snore? T - Are you often Tired during the day? O - Has anyone Observed you stop breathing while sleeping? P- Do you have (or are being treated for) high blood Pressure? B - Are you obese (Body Mass Index > 35)? A - Is your Age 48years old or older? N - Is your Neck size greater than 16 inches? G - Are you male Gender? A sleep physician can prescribe a breathing device that prevents tissues in the throat from blocking your airway. Or your doctor may recommend using a dental device (oral breathing device) to help keep your airway open.  In some cases, surgery may be needed to remove enlarged tissues in the throat. Follow-up care is a key part of your treatment and safety. Be sure to make and go to all appointments, and call your doctor if you are having problems. It's also a good idea to know your test results and keep a list of the medicines you take. How can you care for yourself at home? Lose weight, if needed. It may reduce the number of times you stop breathing or have slowed breathing. Go to bed at the same time every night. Sleep on your side. It may stop mild apnea. If you tend to roll onto your back, sew a pocket in the back of your paRapid [employer] Packaging top. Put a tennis ball into the pocket, and stitch the pocket shut. This will help keep you from sleeping on your back. Avoid alcohol and medicines such as sleeping pills and sedatives before bed. Do not smoke. Smoking can make sleep apnea worse. If you need help quitting, talk to your doctor about stop-smoking programs and medicines. These can increase your chances of quitting for good. Prop up the head of your bed 4 to 6 inches by putting bricks under the legs of the bed. Treat breathing problems, such as a stuffy nose, caused by a cold or allergies. Use a continuous positive airway pressure (CPAP) breathing machine if lifestyle changes do not help your apnea and your doctor recommends it. The machine keeps your airway from closing when you sleep. If CPAP does not help you, ask your doctor whether you should try other breathing machines. A bilevel positive airway pressure machine has two types of air pressureâone for breathing in and one for breathing out. Another device raises or lowers air pressure as needed while you breathe. If your nose feels dry or bleeds when using one of these machines, talk with your doctor about increasing moisture in the air. A humidifier may help.   If your nose is runny or stuffy from using a breathing machine, talk with your doctor about using decongestants or a corticosteroid nasal spray.  When should you call for help? Watch closely for changes in your health, and be sure to contact your doctor if:  You still have sleep apnea even though you have made lifestyle changes. You are thinking of trying a device such as CPAP. You are having problems using a CPAP or similar machine. Where can you learn more? Go to [employer]. Enter W156 in the search box to learn more about \"Sleep Apnea: After Your Visit. \"   © 1011-5273 Healthwise, Incorporated. Care instructions adapted under license by [facility] (which disclaims liability or warranty for this information). This care instruction is for use with your licensed healthcare professional. If you have questions about a medical condition or this instruction, always ask your healthcare professional. Karen Juan any warranty or liability for your use of this information. PROPER SLEEP HYGIENE    What to avoid  Do not have drinks with caffeine, such as coffee or black tea, for 8 hours before bed. Do not smoke or use other types of tobacco near bedtime. Nicotine is a stimulant and can keep you awake. Avoid drinking alcohol late in the evening, because it can cause you to wake in the middle of the night. Do not eat a big meal close to bedtime. If you are hungry, eat a light snack. Do not drink a lot of water close to bedtime, because the need to urinate may wake you up during the night. Do not read or watch TV in bed. Use the bed only for sleeping and sexual activity. What to try  Go to bed at the same time every night, and wake up at the same time every morning. Do not take naps during the day. Keep your bedroom quiet, dark, and cool. Get regular exercise, but not within 3 to 4 hours of your bedtime. .  Sleep on a comfortable pillow and mattress. If watching the clock makes you anxious, turn it facing away from you so you cannot see the time.   If you worry when you lie down, start a worry book. Well before bedtime, write down your worries, and then set the book and your concerns aside. Try meditation or other relaxation techniques before you go to bed. If you cannot fall asleep, get up and go to another room until you feel sleepy. Do something relaxing. Repeat your bedtime routine before you go to bed again. Make your house quiet and calm about an hour before bedtime. Turn down the lights, turn off the TV, log off the computer, and turn down the volume on music. This can help you relax after a busy day. Drowsy Driving  The [address] [place] [address] Road Traffic Safety Administration cites drowsiness as a causing factor in more than 371,718 police reported crashes annually, resulting in 76,000 injuries and 1,500 deaths. Other surveys suggest 55% of people polled have driven while drowsy in the past year, 23% had fallen asleep but not crashed, 3% crashed, and 2% had and accident due to drowsy driving. Who is at risk? Young Drivers: One study of drowsy driving accidents states that 55% of the drivers were under 25 years. Of those, 75% were male. Shift Workers and Travelers: People who work overnight or travel across time zones frequently are at higher risk of experiencing Circadian Rhythm Disorders. They are trying to work and function when their body is programed to sleep. Sleep Deprived: Lack of sleep has a serious impact on your ability to pay attention or focus on a task. Consistently getting less than the average of 8 hours your body needs creates partial or cumulative sleep deprivation. Untreated Sleep Disorders: Sleep Apnea, Narcolepsy, R.L.S., and other sleep disorders (untreated) prevent a person from getting enough restful sleep. This leads to excessive daytime sleepiness and increases the risk for drowsy driving accidents by up to 7 times. Medications / Alcohol: Even over the counter medications can cause drowsiness.  Medications that impair a drivers attention should have a warning label. Alcohol naturally makes you sleepy and on its own can cause accidents. Combined with excessive drowsiness its effects are amplified. Signs of Drowsy Driving:   * You don't remember driving the last few miles   * You may drift out of your javad   * You are unable to focus and your thoughts wander   * You may yawn more often than normal   * You have difficulty keeping your eyes open / nodding off   * Missing traffic signs, speeding, or tailgating  Prevention-   Good sleep hygiene, lifestyle and behavioral choices have the most impact on drowsy driving. There is no substitute for sleep and the average person requires 8 hours nightly. If you find yourself driving drowsy, stop and sleep. Consider the sleep hygiene tips provided during your visit as well. Medication Refill Policy: Refills for all medications require 1 week advance notice. Please have your pharmacy fax a refill request. We are unable to fax, or call in \"controled substance\" medications and you will need to pick these prescriptions up from our office. SDI Activation    Thank you for requesting access to SDI. Please follow the instructions below to securely access and download your online medical record. SDI allows you to send messages to your doctor, view your test results, renew your prescriptions, schedule appointments, and more. How Do I Sign Up? In your internet browser, go to [email or web address]. Click on the First Time User? Click Here link in the Sign In box. You will see the New Member Sign Up page. Enter your SDI Access Code exactly as it appears below. You will not need to use this code after youve completed the sign-up process. If you do not sign up before the expiration date, you must request a new code. SDI Access Code:  Activation code not generated  Current SDI Status: Active (This is the date your SDI access code will )    Enter the last four digits of your Social Security Number (xxxx) and Date of Birth (mm/dd/yyyy) as indicated and click Submit. You will be taken to the next sign-up page. Create a Craftistas ID. This will be your Craftistas login ID and cannot be changed, so think of one that is secure and easy to remember. Create a [employer]istas password. You can change your password at any time. Enter your Password Reset Question and Answer. This can be used at a later time if you forget your password. Enter your e-mail address. You will receive e-mail notification when new information is available in [address] Click Sign Up. You can now view and download portions of your medical record. Click the IntroMaps link to download a portable copy of your medical information. Additional Information    If you have questions, please call [phone]. Remember, Craftistas is NOT to be used for urgent needs. For medical emergencies, dial 911.

## 2023-04-03 NOTE — PROGRESS NOTES
217 [facility] Sathya. [place], [address]  Tel.  [phone]  Fax. 100 [facility] 60  [place], [address] [facility]  Tel.  [phone]  Fax. [phone] [address] [address]  Tel.  [phone]  Fax. [phone]       S>Eliana Sales is a 63 y.o. female seen today seen today for a PAP download. PAP was not accessible remotely. Download scanned into patient's chart and routed to the appropriate physician for review prior to her new patient appointment. Last data available - 2/10/23.

## 2023-04-05 ENCOUNTER — DOCUMENTATION ONLY (OUTPATIENT)
Dept: SLEEP MEDICINE | Age: 63
End: 2023-04-05

## 2023-05-26 ENCOUNTER — TELEPHONE (OUTPATIENT)
Age: 63
End: 2023-05-26

## 2023-05-26 DIAGNOSIS — G47.33 OSA (OBSTRUCTIVE SLEEP APNEA): Primary | ICD-10-CM

## 2023-05-30 NOTE — TELEPHONE ENCOUNTER
Jean Claude Muir is to be contacted by sleep technologists regarding results of Sleep Testing which was indicative of an average AHI of 59.9 per hour with an SpO2 jalyn of 15.2%. An APAP prescription has been written and patient will be contacted by office staff regarding follow-up  in 2-3 months after initiation of therapy. Encounter Diagnosis   Name Primary? JOSE C (obstructive sleep apnea) Yes       Orders Placed This Encounter   Procedures    DME Order for (Specify) as OP     - DME device ordered - ResMed Device with Heated Humidifer  / .   - Diagnosis: Obstructive Sleep Apnea (G47.33)  - Length of Need: Lifetime    Pressure Settin - 15 cmH2O     Nasal Cushion (Replace) 2 per month.  Nasal Interface Mask 1 every 3 months.  Headgear 1 every 6 months.  Filter(s) Disposable 2 per month.  Filter(s) Non-Disposable 1 every 6 months. [address] for [employer] (Replace) 1 every 6 months.  Tubing with heating element 1 every 3 months. Perform Mask Fitting per patient preference and comfort - replace as above. Casey Crigler, MD, FAA; NPI: [id number]  Electronically signed.  2023

## 2023-08-22 ENCOUNTER — TELEPHONE (OUTPATIENT)
Age: 63
End: 2023-08-22

## 2023-10-25 ENCOUNTER — TELEMEDICINE (OUTPATIENT)
Age: 63
End: 2023-10-25
Payer: COMMERCIAL

## 2023-10-25 DIAGNOSIS — G47.33 OSA (OBSTRUCTIVE SLEEP APNEA): Primary | ICD-10-CM

## 2023-10-25 PROCEDURE — 99213 OFFICE O/P EST LOW 20 MIN: CPT | Performed by: INTERNAL MEDICINE

## 2023-10-25 RX ORDER — SEMAGLUTIDE 1.34 MG/ML
INJECTION, SOLUTION SUBCUTANEOUS
COMMUNITY
Start: 2023-08-25

## 2023-10-25 ASSESSMENT — SLEEP AND FATIGUE QUESTIONNAIRES
HOW LIKELY ARE YOU TO NOD OFF OR FALL ASLEEP WHILE SITTING QUIETLY AFTER LUNCH WITHOUT ALCOHOL: WOULD NEVER DOZE
HOW LIKELY ARE YOU TO NOD OFF OR FALL ASLEEP WHILE SITTING INACTIVE IN A PUBLIC PLACE: 0
HOW LIKELY ARE YOU TO NOD OFF OR FALL ASLEEP WHEN YOU ARE A PASSENGER IN A CAR FOR AN HOUR WITHOUT A BREAK: 0
HOW LIKELY ARE YOU TO NOD OFF OR FALL ASLEEP IN A CAR, WHILE STOPPED FOR A FEW MINUTES IN TRAFFIC: WOULD NEVER DOZE
HOW LIKELY ARE YOU TO NOD OFF OR FALL ASLEEP IN A CAR, WHILE STOPPED FOR A FEW MINUTES IN TRAFFIC: 0
HOW LIKELY ARE YOU TO NOD OFF OR FALL ASLEEP IN A CAR, WHILE STOPPED FOR A FEW MINUTES IN TRAFFIC: 0
HOW LIKELY ARE YOU TO NOD OFF OR FALL ASLEEP WHILE SITTING AND READING: 0
HOW LIKELY ARE YOU TO NOD OFF OR FALL ASLEEP WHILE SITTING AND TALKING TO SOMEONE: WOULD NEVER DOZE
HOW LIKELY ARE YOU TO NOD OFF OR FALL ASLEEP WHILE SITTING QUIETLY AFTER LUNCH WITHOUT ALCOHOL: 0
HOW LIKELY ARE YOU TO NOD OFF OR FALL ASLEEP WHILE SITTING INACTIVE IN A PUBLIC PLACE: WOULD NEVER DOZE
HOW LIKELY ARE YOU TO NOD OFF OR FALL ASLEEP WHILE WATCHING TV: SLIGHT CHANCE OF DOZING
HOW LIKELY ARE YOU TO NOD OFF OR FALL ASLEEP WHILE LYING DOWN TO REST IN THE AFTERNOON WHEN CIRCUMSTANCES PERMIT: 1
HOW LIKELY ARE YOU TO NOD OFF OR FALL ASLEEP WHILE WATCHING TV: 1
HOW LIKELY ARE YOU TO NOD OFF OR FALL ASLEEP WHILE WATCHING TV: 0
HOW LIKELY ARE YOU TO NOD OFF OR FALL ASLEEP WHILE SITTING AND TALKING TO SOMEONE: 0
HOW LIKELY ARE YOU TO NOD OFF OR FALL ASLEEP WHILE SITTING AND TALKING TO SOMEONE: 0
HOW LIKELY ARE YOU TO NOD OFF OR FALL ASLEEP WHILE SITTING AND READING: WOULD NEVER DOZE
HOW LIKELY ARE YOU TO NOD OFF OR FALL ASLEEP WHILE SITTING AND READING: 0
HOW LIKELY ARE YOU TO NOD OFF OR FALL ASLEEP WHILE SITTING QUIETLY AFTER LUNCH WITHOUT ALCOHOL: 0
ESS TOTAL SCORE: 2
HOW LIKELY ARE YOU TO NOD OFF OR FALL ASLEEP WHEN YOU ARE A PASSENGER IN A CAR FOR AN HOUR WITHOUT A BREAK: WOULD NEVER DOZE
HOW LIKELY ARE YOU TO NOD OFF OR FALL ASLEEP WHEN YOU ARE A PASSENGER IN A CAR FOR AN HOUR WITHOUT A BREAK: 0
HOW LIKELY ARE YOU TO NOD OFF OR FALL ASLEEP WHILE LYING DOWN TO REST IN THE AFTERNOON WHEN CIRCUMSTANCES PERMIT: 1
ESS TOTAL SCORE: 1
HOW LIKELY ARE YOU TO NOD OFF OR FALL ASLEEP WHILE LYING DOWN TO REST IN THE AFTERNOON WHEN CIRCUMSTANCES PERMIT: SLIGHT CHANCE OF DOZING
HOW LIKELY ARE YOU TO NOD OFF OR FALL ASLEEP WHILE SITTING INACTIVE IN A PUBLIC PLACE: 0

## 2023-10-25 NOTE — PATIENT INSTRUCTIONS
17[address] [facility].[place], [address]  Tel.  [phone]  Fax. [address] [facility], [address] [place] [address]  Tel.  [phone]  Fax. [phone] [facility], [address] [facility]  Tel.  [phone]  Fax. [phone]     Learning About CPAP for Sleep Apnea  What is CPAP? CPAP is a small machine that you use at home every night while you sleep. It increases air pressure in your throat to keep your airway open. When you have sleep apnea, this can help you sleep better so you feel much better. CPAP stands for \"continuous positive airway pressure. \"  The CPAP machine will have one of the following:  A mask that covers your nose and mouth  Prongs that fit into your nose  A mask that covers your nose only, the most common type. This type is called NCPAP. The N stands for \"nasal.\"  Why is it done? CPAP is usually the best treatment for obstructive sleep apnea. It is the first treatment choice and the most widely used. Your doctor may suggest CPAP if you have: Moderate to severe sleep apnea. Sleep apnea and coronary artery disease (CAD) or heart failure. How does it help? CPAP can help you have more normal sleep, so you feel less sleepy and more alert during the daytime. CPAP may help keep heart failure or other heart problems from getting worse. NCPAP may help lower your blood pressure. If you use CPAP, your bed partner may also sleep better because you are not snoring or restless. What are the side effects? Some people who use CPAP have:  A dry or stuffy nose and a sore throat. Irritated skin on the face. Sore eyes. Bloating. If you have any of these problems, work with your doctor to fix them. Here are some things you can try:  Be sure the mask or nasal prongs fit well. See if your doctor can adjust the pressure of your CPAP. If your nose is dry, try a humidifier.   If your nose is runny or stuffy, try decongestant medicine or a steroid

## 2023-10-25 NOTE — PROGRESS NOTES
[address] [facility], [address]  Tel.  [phone]    Fax. [address] [facility], [facility] Jessica [place]  Tel.  [phone]    Fax. [phone]     [facility], [address] [facility]  Tel.  [phone]    Fax. Chapo Harrell (: 1960) is a 66 y.o. female, established patient, seen for positive airway pressure follow-up and evaluation of the following chief complaint(s):   Sleep Problem (1st adherence)       Odell Castro was last seen by me on 23, prior notes reviewed in detail. Home Sleep Apnea Test (HSAT) performed on 23  was indicative of an average AHI of 59.9 per hour with an SpO2 jalyn of 15.2%. ASSESSMENT/PLAN:   Diagnosis Orders   1. JOSE C (obstructive sleep apnea)        2. BMI 37.0-37.9, adult            On ResMed:  AirSense 10 AutoSet    Settings:  Min EPAP: 05 cmH2O  Max EPAP: 20 cmH2O    EPR: 2    Sleep Apnea -   * She is adherent with PAP therapy and PAP continues to benefit patient and remains necessary for control of her sleep apnea. Continue on current pressures      * She is familiar with the telephone monitoring application, is willing to track therapy and agrees to notify use if AHI is >5 per hour. * Counseling was provided regarding the importance of regular PAP use with emphasis on ensuring sufficient total sleep time, proper sleep hygiene, and safe driving. * Re-enforced proper and regular cleaning for the device. * She was asked to contact our office for any problems regarding PAP therapy. 2. Recommended a dedicated weight loss program through appropriate diet and exercise regimen as significant weight reduction has been shown to reduce severity of obstructive sleep apnea. Return for follow-up in 1 year or as needed. SUBJECTIVE/OBJECTIVE:    She  is seen today for follow up on PAP device and reports no problems using the device.    The following concerns

## 2023-12-11 ENCOUNTER — HOSPITAL ENCOUNTER (OUTPATIENT)
Facility: HOSPITAL | Age: 63
Discharge: HOME | End: 2023-12-14
Payer: COMMERCIAL

## 2023-12-11 ENCOUNTER — TRANSCRIBE ORDERS (OUTPATIENT)
Facility: HOSPITAL | Age: 63
End: 2023-12-11

## 2023-12-11 DIAGNOSIS — Z85.528 HISTORY OF RENAL CARCINOMA: ICD-10-CM

## 2023-12-11 DIAGNOSIS — Z85.528 HISTORY OF RENAL CARCINOMA: Primary | ICD-10-CM

## 2023-12-11 PROCEDURE — 71046 X-RAY EXAM CHEST 2 VIEWS: CPT

## 2024-04-09 ENCOUNTER — ANESTHESIA EVENT (OUTPATIENT)
Facility: HOSPITAL | Age: 64
DRG: 854 | End: 2024-04-09
Payer: COMMERCIAL

## 2024-04-09 ENCOUNTER — ANESTHESIA (OUTPATIENT)
Facility: HOSPITAL | Age: 64
DRG: 854 | End: 2024-04-09
Payer: COMMERCIAL

## 2024-04-09 ENCOUNTER — APPOINTMENT (OUTPATIENT)
Facility: HOSPITAL | Age: 64
DRG: 854 | End: 2024-04-09
Payer: COMMERCIAL

## 2024-04-09 ENCOUNTER — HOSPITAL ENCOUNTER (INPATIENT)
Facility: HOSPITAL | Age: 64
LOS: 2 days | Discharge: HOME | DRG: 854 | End: 2024-04-11
Attending: EMERGENCY MEDICINE | Admitting: INTERNAL MEDICINE
Payer: COMMERCIAL

## 2024-04-09 DIAGNOSIS — N20.0 KIDNEY STONE: Primary | ICD-10-CM

## 2024-04-09 PROBLEM — N13.30 HYDRONEPHROSIS OF LEFT KIDNEY: Status: ACTIVE | Noted: 2024-04-09

## 2024-04-09 LAB
ALBUMIN SERPL-MCNC: 3.8 G/DL (ref 3.5–5)
ALBUMIN/GLOB SERPL: 1.2 (ref 1.1–2.2)
ALP SERPL-CCNC: 92 U/L (ref 45–117)
ALT SERPL-CCNC: 26 U/L (ref 12–78)
ANION GAP SERPL CALC-SCNC: 6 MMOL/L (ref 5–15)
APPEARANCE UR: ABNORMAL
AST SERPL-CCNC: 19 U/L (ref 15–37)
BACTERIA URNS QL MICRO: NEGATIVE /HPF
BASOPHILS # BLD: 0.1 K/UL (ref 0–0.1)
BASOPHILS NFR BLD: 1 % (ref 0–1)
BILIRUB SERPL-MCNC: 1.4 MG/DL (ref 0.2–1)
BILIRUB UR QL: NEGATIVE
BUN SERPL-MCNC: 14 MG/DL (ref 6–20)
BUN/CREAT SERPL: 10 (ref 12–20)
CALCIUM SERPL-MCNC: 9.1 MG/DL (ref 8.5–10.1)
CHLORIDE SERPL-SCNC: 104 MMOL/L (ref 97–108)
CO2 SERPL-SCNC: 29 MMOL/L (ref 21–32)
COLOR UR: ABNORMAL
CREAT SERPL-MCNC: 1.4 MG/DL (ref 0.55–1.02)
DIFFERENTIAL METHOD BLD: ABNORMAL
EOSINOPHIL # BLD: 0.3 K/UL (ref 0–0.4)
EOSINOPHIL NFR BLD: 2 % (ref 0–7)
EPITH CASTS URNS QL MICRO: ABNORMAL /LPF
ERYTHROCYTE [DISTWIDTH] IN BLOOD BY AUTOMATED COUNT: 13 % (ref 11.5–14.5)
GLOBULIN SER CALC-MCNC: 3.2 G/DL (ref 2–4)
GLUCOSE SERPL-MCNC: 118 MG/DL (ref 65–100)
GLUCOSE UR STRIP.AUTO-MCNC: NEGATIVE MG/DL
HCT VFR BLD AUTO: 41.7 % (ref 35–47)
HGB BLD-MCNC: 14.3 G/DL (ref 11.5–16)
HGB UR QL STRIP: ABNORMAL
HYALINE CASTS URNS QL MICRO: ABNORMAL /LPF (ref 0–2)
IMM GRANULOCYTES # BLD AUTO: 0.1 K/UL (ref 0–0.04)
IMM GRANULOCYTES NFR BLD AUTO: 1 % (ref 0–0.5)
KETONES UR QL STRIP.AUTO: NEGATIVE MG/DL
LEUKOCYTE ESTERASE UR QL STRIP.AUTO: ABNORMAL
LIPASE SERPL-CCNC: 56 U/L (ref 13–75)
LYMPHOCYTES # BLD: 1.8 K/UL (ref 0.8–3.5)
LYMPHOCYTES NFR BLD: 13 % (ref 12–49)
MCH RBC QN AUTO: 30.4 PG (ref 26–34)
MCHC RBC AUTO-ENTMCNC: 34.3 G/DL (ref 30–36.5)
MCV RBC AUTO: 88.7 FL (ref 80–99)
MONOCYTES # BLD: 0.8 K/UL (ref 0–1)
MONOCYTES NFR BLD: 6 % (ref 5–13)
NEUTS SEG # BLD: 10.6 K/UL (ref 1.8–8)
NEUTS SEG NFR BLD: 77 % (ref 32–75)
NITRITE UR QL STRIP.AUTO: NEGATIVE
NRBC # BLD: 0 K/UL (ref 0–0.01)
NRBC BLD-RTO: 0 PER 100 WBC
PH UR STRIP: 7.5 (ref 5–8)
PLATELET # BLD AUTO: 259 K/UL (ref 150–400)
PMV BLD AUTO: 9.5 FL (ref 8.9–12.9)
POTASSIUM SERPL-SCNC: 3.8 MMOL/L (ref 3.5–5.1)
PROT SERPL-MCNC: 7 G/DL (ref 6.4–8.2)
PROT UR STRIP-MCNC: 100 MG/DL
RBC # BLD AUTO: 4.7 M/UL (ref 3.8–5.2)
RBC #/AREA URNS HPF: >100 /HPF (ref 0–5)
SODIUM SERPL-SCNC: 139 MMOL/L (ref 136–145)
SP GR UR REFRACTOMETRY: 1.01
URINE CULTURE IF INDICATED: ABNORMAL
UROBILINOGEN UR QL STRIP.AUTO: 1 EU/DL (ref 0.2–1)
WBC # BLD AUTO: 13.6 K/UL (ref 3.6–11)
WBC URNS QL MICRO: >100 /HPF (ref 0–4)

## 2024-04-09 PROCEDURE — 3700000000 HC ANESTHESIA ATTENDED CARE: Performed by: UROLOGY

## 2024-04-09 PROCEDURE — 7100000000 HC PACU RECOVERY - FIRST 15 MIN: Performed by: UROLOGY

## 2024-04-09 PROCEDURE — 3600000013 HC SURGERY LEVEL 3 ADDTL 15MIN: Performed by: UROLOGY

## 2024-04-09 PROCEDURE — 80053 COMPREHEN METABOLIC PANEL: CPT

## 2024-04-09 PROCEDURE — 1100000003 HC PRIVATE W/ TELEMETRY

## 2024-04-09 PROCEDURE — 3700000001 HC ADD 15 MINUTES (ANESTHESIA): Performed by: UROLOGY

## 2024-04-09 PROCEDURE — 99285 EMERGENCY DEPT VISIT HI MDM: CPT

## 2024-04-09 PROCEDURE — 74177 CT ABD & PELVIS W/CONTRAST: CPT

## 2024-04-09 PROCEDURE — 2580000003 HC RX 258: Performed by: EMERGENCY MEDICINE

## 2024-04-09 PROCEDURE — 85025 COMPLETE CBC W/AUTO DIFF WBC: CPT

## 2024-04-09 PROCEDURE — 87077 CULTURE AEROBIC IDENTIFY: CPT

## 2024-04-09 PROCEDURE — 6360000004 HC RX CONTRAST MEDICATION: Performed by: RADIOLOGY

## 2024-04-09 PROCEDURE — 87086 URINE CULTURE/COLONY COUNT: CPT

## 2024-04-09 PROCEDURE — C2617 STENT, NON-COR, TEM W/O DEL: HCPCS | Performed by: UROLOGY

## 2024-04-09 PROCEDURE — 2580000003 HC RX 258: Performed by: INTERNAL MEDICINE

## 2024-04-09 PROCEDURE — 96376 TX/PRO/DX INJ SAME DRUG ADON: CPT

## 2024-04-09 PROCEDURE — 3600000003 HC SURGERY LEVEL 3 BASE: Performed by: UROLOGY

## 2024-04-09 PROCEDURE — 6360000002 HC RX W HCPCS: Performed by: INTERNAL MEDICINE

## 2024-04-09 PROCEDURE — 81001 URINALYSIS AUTO W/SCOPE: CPT

## 2024-04-09 PROCEDURE — 96365 THER/PROPH/DIAG IV INF INIT: CPT

## 2024-04-09 PROCEDURE — 94760 N-INVAS EAR/PLS OXIMETRY 1: CPT

## 2024-04-09 PROCEDURE — 2709999900 HC NON-CHARGEABLE SUPPLY: Performed by: UROLOGY

## 2024-04-09 PROCEDURE — 83690 ASSAY OF LIPASE: CPT

## 2024-04-09 PROCEDURE — C1758 CATHETER, URETERAL: HCPCS | Performed by: UROLOGY

## 2024-04-09 PROCEDURE — 6360000002 HC RX W HCPCS: Performed by: EMERGENCY MEDICINE

## 2024-04-09 PROCEDURE — 2500000003 HC RX 250 WO HCPCS: Performed by: NURSE ANESTHETIST, CERTIFIED REGISTERED

## 2024-04-09 PROCEDURE — 2700000000 HC OXYGEN THERAPY PER DAY

## 2024-04-09 PROCEDURE — 2580000003 HC RX 258: Performed by: NURSE ANESTHETIST, CERTIFIED REGISTERED

## 2024-04-09 PROCEDURE — 87186 SC STD MICRODIL/AGAR DIL: CPT

## 2024-04-09 PROCEDURE — 6360000002 HC RX W HCPCS: Performed by: NURSE ANESTHETIST, CERTIFIED REGISTERED

## 2024-04-09 PROCEDURE — 36415 COLL VENOUS BLD VENIPUNCTURE: CPT

## 2024-04-09 PROCEDURE — 96375 TX/PRO/DX INJ NEW DRUG ADDON: CPT

## 2024-04-09 PROCEDURE — 7100000001 HC PACU RECOVERY - ADDTL 15 MIN: Performed by: UROLOGY

## 2024-04-09 PROCEDURE — 6360000002 HC RX W HCPCS: Performed by: NURSE PRACTITIONER

## 2024-04-09 DEVICE — URETERAL STENT
Type: IMPLANTABLE DEVICE | Site: URETER | Status: FUNCTIONAL
Brand: POLARIS™ ULTRA

## 2024-04-09 RX ORDER — IPRATROPIUM BROMIDE AND ALBUTEROL SULFATE 2.5; .5 MG/3ML; MG/3ML
1 SOLUTION RESPIRATORY (INHALATION)
Status: DISCONTINUED | OUTPATIENT
Start: 2024-04-09 | End: 2024-04-10 | Stop reason: HOSPADM

## 2024-04-09 RX ORDER — SODIUM CHLORIDE 9 MG/ML
INJECTION, SOLUTION INTRAVENOUS PRN
Status: DISCONTINUED | OUTPATIENT
Start: 2024-04-09 | End: 2024-04-11 | Stop reason: HOSPADM

## 2024-04-09 RX ORDER — ONDANSETRON 2 MG/ML
4 INJECTION INTRAMUSCULAR; INTRAVENOUS EVERY 6 HOURS PRN
Status: DISCONTINUED | OUTPATIENT
Start: 2024-04-09 | End: 2024-04-11 | Stop reason: HOSPADM

## 2024-04-09 RX ORDER — GLUCAGON 1 MG/ML
1 KIT INJECTION PRN
Status: DISCONTINUED | OUTPATIENT
Start: 2024-04-09 | End: 2024-04-10 | Stop reason: HOSPADM

## 2024-04-09 RX ORDER — SODIUM CHLORIDE 0.9 % (FLUSH) 0.9 %
5-40 SYRINGE (ML) INJECTION EVERY 12 HOURS SCHEDULED
Status: DISCONTINUED | OUTPATIENT
Start: 2024-04-09 | End: 2024-04-11 | Stop reason: HOSPADM

## 2024-04-09 RX ORDER — ONDANSETRON 2 MG/ML
4 INJECTION INTRAMUSCULAR; INTRAVENOUS ONCE
Status: COMPLETED | OUTPATIENT
Start: 2024-04-09 | End: 2024-04-09

## 2024-04-09 RX ORDER — DIPHENHYDRAMINE HYDROCHLORIDE 50 MG/ML
INJECTION INTRAMUSCULAR; INTRAVENOUS PRN
Status: DISCONTINUED | OUTPATIENT
Start: 2024-04-09 | End: 2024-04-09 | Stop reason: SDUPTHER

## 2024-04-09 RX ORDER — MIDAZOLAM HYDROCHLORIDE 1 MG/ML
INJECTION INTRAMUSCULAR; INTRAVENOUS PRN
Status: DISCONTINUED | OUTPATIENT
Start: 2024-04-09 | End: 2024-04-09 | Stop reason: SDUPTHER

## 2024-04-09 RX ORDER — DEXTROSE MONOHYDRATE 100 MG/ML
INJECTION, SOLUTION INTRAVENOUS CONTINUOUS PRN
Status: DISCONTINUED | OUTPATIENT
Start: 2024-04-09 | End: 2024-04-10 | Stop reason: HOSPADM

## 2024-04-09 RX ORDER — DEXMEDETOMIDINE HYDROCHLORIDE 100 UG/ML
INJECTION, SOLUTION INTRAVENOUS PRN
Status: DISCONTINUED | OUTPATIENT
Start: 2024-04-09 | End: 2024-04-09 | Stop reason: SDUPTHER

## 2024-04-09 RX ORDER — MORPHINE SULFATE 4 MG/ML
4 INJECTION, SOLUTION INTRAMUSCULAR; INTRAVENOUS ONCE
Status: COMPLETED | OUTPATIENT
Start: 2024-04-09 | End: 2024-04-09

## 2024-04-09 RX ORDER — MORPHINE SULFATE 2 MG/ML
2 INJECTION, SOLUTION INTRAMUSCULAR; INTRAVENOUS
Status: COMPLETED | OUTPATIENT
Start: 2024-04-09 | End: 2024-04-09

## 2024-04-09 RX ORDER — DEXAMETHASONE SODIUM PHOSPHATE 4 MG/ML
INJECTION, SOLUTION INTRA-ARTICULAR; INTRALESIONAL; INTRAMUSCULAR; INTRAVENOUS; SOFT TISSUE PRN
Status: DISCONTINUED | OUTPATIENT
Start: 2024-04-09 | End: 2024-04-09 | Stop reason: SDUPTHER

## 2024-04-09 RX ORDER — ONDANSETRON 4 MG/1
4 TABLET, ORALLY DISINTEGRATING ORAL EVERY 8 HOURS PRN
Status: DISCONTINUED | OUTPATIENT
Start: 2024-04-09 | End: 2024-04-11 | Stop reason: HOSPADM

## 2024-04-09 RX ORDER — ONDANSETRON 2 MG/ML
INJECTION INTRAMUSCULAR; INTRAVENOUS PRN
Status: DISCONTINUED | OUTPATIENT
Start: 2024-04-09 | End: 2024-04-09 | Stop reason: SDUPTHER

## 2024-04-09 RX ORDER — FENTANYL CITRATE 50 UG/ML
25 INJECTION, SOLUTION INTRAMUSCULAR; INTRAVENOUS EVERY 5 MIN PRN
Status: DISCONTINUED | OUTPATIENT
Start: 2024-04-09 | End: 2024-04-10 | Stop reason: HOSPADM

## 2024-04-09 RX ORDER — SODIUM CHLORIDE, SODIUM LACTATE, POTASSIUM CHLORIDE, CALCIUM CHLORIDE 600; 310; 30; 20 MG/100ML; MG/100ML; MG/100ML; MG/100ML
INJECTION, SOLUTION INTRAVENOUS CONTINUOUS PRN
Status: DISCONTINUED | OUTPATIENT
Start: 2024-04-09 | End: 2024-04-09 | Stop reason: SDUPTHER

## 2024-04-09 RX ORDER — OXYCODONE HYDROCHLORIDE 5 MG/1
2.5 TABLET ORAL EVERY 4 HOURS PRN
Status: DISCONTINUED | OUTPATIENT
Start: 2024-04-09 | End: 2024-04-11 | Stop reason: HOSPADM

## 2024-04-09 RX ORDER — MORPHINE SULFATE 2 MG/ML
2 INJECTION, SOLUTION INTRAMUSCULAR; INTRAVENOUS EVERY 4 HOURS PRN
Status: DISCONTINUED | OUTPATIENT
Start: 2024-04-09 | End: 2024-04-11 | Stop reason: HOSPADM

## 2024-04-09 RX ORDER — CEFAZOLIN SODIUM 1 G/3ML
INJECTION, POWDER, FOR SOLUTION INTRAMUSCULAR; INTRAVENOUS PRN
Status: DISCONTINUED | OUTPATIENT
Start: 2024-04-09 | End: 2024-04-09 | Stop reason: SDUPTHER

## 2024-04-09 RX ORDER — FENTANYL CITRATE 50 UG/ML
INJECTION, SOLUTION INTRAMUSCULAR; INTRAVENOUS PRN
Status: DISCONTINUED | OUTPATIENT
Start: 2024-04-09 | End: 2024-04-09 | Stop reason: SDUPTHER

## 2024-04-09 RX ORDER — NALOXONE HYDROCHLORIDE 0.4 MG/ML
INJECTION, SOLUTION INTRAMUSCULAR; INTRAVENOUS; SUBCUTANEOUS PRN
Status: DISCONTINUED | OUTPATIENT
Start: 2024-04-09 | End: 2024-04-10 | Stop reason: HOSPADM

## 2024-04-09 RX ORDER — 0.9 % SODIUM CHLORIDE 0.9 %
500 INTRAVENOUS SOLUTION INTRAVENOUS ONCE
Status: COMPLETED | OUTPATIENT
Start: 2024-04-09 | End: 2024-04-09

## 2024-04-09 RX ORDER — 0.9 % SODIUM CHLORIDE 0.9 %
1000 INTRAVENOUS SOLUTION INTRAVENOUS ONCE
Status: COMPLETED | OUTPATIENT
Start: 2024-04-09 | End: 2024-04-09

## 2024-04-09 RX ORDER — MORPHINE SULFATE 2 MG/ML
2 INJECTION, SOLUTION INTRAMUSCULAR; INTRAVENOUS ONCE
Status: COMPLETED | OUTPATIENT
Start: 2024-04-09 | End: 2024-04-09

## 2024-04-09 RX ORDER — TAMSULOSIN HYDROCHLORIDE 0.4 MG/1
0.4 CAPSULE ORAL DAILY
Status: DISCONTINUED | OUTPATIENT
Start: 2024-04-09 | End: 2024-04-11 | Stop reason: HOSPADM

## 2024-04-09 RX ORDER — ACETAMINOPHEN 650 MG/1
650 SUPPOSITORY RECTAL EVERY 6 HOURS PRN
Status: DISCONTINUED | OUTPATIENT
Start: 2024-04-09 | End: 2024-04-11 | Stop reason: HOSPADM

## 2024-04-09 RX ORDER — ACETAMINOPHEN 325 MG/1
650 TABLET ORAL EVERY 6 HOURS PRN
Status: DISCONTINUED | OUTPATIENT
Start: 2024-04-09 | End: 2024-04-11 | Stop reason: HOSPADM

## 2024-04-09 RX ORDER — POLYETHYLENE GLYCOL 3350 17 G/17G
17 POWDER, FOR SOLUTION ORAL DAILY PRN
Status: DISCONTINUED | OUTPATIENT
Start: 2024-04-09 | End: 2024-04-11 | Stop reason: HOSPADM

## 2024-04-09 RX ORDER — HYDROMORPHONE HYDROCHLORIDE 1 MG/ML
0.5 INJECTION, SOLUTION INTRAMUSCULAR; INTRAVENOUS; SUBCUTANEOUS EVERY 5 MIN PRN
Status: DISCONTINUED | OUTPATIENT
Start: 2024-04-09 | End: 2024-04-10 | Stop reason: HOSPADM

## 2024-04-09 RX ORDER — SODIUM CHLORIDE 0.9 % (FLUSH) 0.9 %
5-40 SYRINGE (ML) INJECTION EVERY 12 HOURS SCHEDULED
Status: DISCONTINUED | OUTPATIENT
Start: 2024-04-09 | End: 2024-04-10 | Stop reason: HOSPADM

## 2024-04-09 RX ORDER — PROCHLORPERAZINE EDISYLATE 5 MG/ML
5 INJECTION INTRAMUSCULAR; INTRAVENOUS
Status: DISCONTINUED | OUTPATIENT
Start: 2024-04-09 | End: 2024-04-10 | Stop reason: HOSPADM

## 2024-04-09 RX ORDER — SODIUM CHLORIDE 0.9 % (FLUSH) 0.9 %
5-40 SYRINGE (ML) INJECTION PRN
Status: DISCONTINUED | OUTPATIENT
Start: 2024-04-09 | End: 2024-04-10 | Stop reason: HOSPADM

## 2024-04-09 RX ORDER — SODIUM CHLORIDE 9 MG/ML
INJECTION, SOLUTION INTRAVENOUS PRN
Status: DISCONTINUED | OUTPATIENT
Start: 2024-04-09 | End: 2024-04-10 | Stop reason: HOSPADM

## 2024-04-09 RX ORDER — FAMOTIDINE 20 MG/1
40 TABLET, FILM COATED ORAL DAILY
Status: DISCONTINUED | OUTPATIENT
Start: 2024-04-09 | End: 2024-04-11 | Stop reason: HOSPADM

## 2024-04-09 RX ORDER — SODIUM CHLORIDE 9 MG/ML
INJECTION, SOLUTION INTRAVENOUS CONTINUOUS
Status: DISCONTINUED | OUTPATIENT
Start: 2024-04-09 | End: 2024-04-11 | Stop reason: HOSPADM

## 2024-04-09 RX ORDER — ONDANSETRON 2 MG/ML
4 INJECTION INTRAMUSCULAR; INTRAVENOUS
Status: DISCONTINUED | OUTPATIENT
Start: 2024-04-09 | End: 2024-04-10 | Stop reason: HOSPADM

## 2024-04-09 RX ORDER — SODIUM CHLORIDE 0.9 % (FLUSH) 0.9 %
5-40 SYRINGE (ML) INJECTION PRN
Status: DISCONTINUED | OUTPATIENT
Start: 2024-04-09 | End: 2024-04-11 | Stop reason: HOSPADM

## 2024-04-09 RX ADMIN — PROPOFOL 30 MG: 10 INJECTION, EMULSION INTRAVENOUS at 21:54

## 2024-04-09 RX ADMIN — SODIUM CHLORIDE: 9 INJECTION, SOLUTION INTRAVENOUS at 08:42

## 2024-04-09 RX ADMIN — PROPOFOL 75 MCG/KG/MIN: 10 INJECTION, EMULSION INTRAVENOUS at 21:55

## 2024-04-09 RX ADMIN — SODIUM CHLORIDE, POTASSIUM CHLORIDE, SODIUM LACTATE AND CALCIUM CHLORIDE: 600; 310; 30; 20 INJECTION, SOLUTION INTRAVENOUS at 21:48

## 2024-04-09 RX ADMIN — DEXMEDETOMIDINE HYDROCHLORIDE 4 MCG: 100 INJECTION, SOLUTION, CONCENTRATE INTRAVENOUS at 22:06

## 2024-04-09 RX ADMIN — MORPHINE SULFATE 2 MG: 2 INJECTION, SOLUTION INTRAMUSCULAR; INTRAVENOUS at 15:34

## 2024-04-09 RX ADMIN — DEXMEDETOMIDINE HYDROCHLORIDE 4 MCG: 100 INJECTION, SOLUTION, CONCENTRATE INTRAVENOUS at 22:09

## 2024-04-09 RX ADMIN — IOPAMIDOL 100 ML: 755 INJECTION, SOLUTION INTRAVENOUS at 05:49

## 2024-04-09 RX ADMIN — SODIUM CHLORIDE 1000 ML: 9 INJECTION, SOLUTION INTRAVENOUS at 04:04

## 2024-04-09 RX ADMIN — ONDANSETRON 4 MG: 2 INJECTION INTRAMUSCULAR; INTRAVENOUS at 04:06

## 2024-04-09 RX ADMIN — ONDANSETRON 4 MG: 2 INJECTION INTRAMUSCULAR; INTRAVENOUS at 10:51

## 2024-04-09 RX ADMIN — FENTANYL CITRATE 50 MCG: 50 INJECTION, SOLUTION INTRAMUSCULAR; INTRAVENOUS at 21:53

## 2024-04-09 RX ADMIN — SODIUM CHLORIDE: 9 INJECTION, SOLUTION INTRAVENOUS at 19:37

## 2024-04-09 RX ADMIN — CEFEPIME 2000 MG: 2 INJECTION, POWDER, FOR SOLUTION INTRAVENOUS at 06:20

## 2024-04-09 RX ADMIN — ONDANSETRON 4 MG: 2 INJECTION INTRAMUSCULAR; INTRAVENOUS at 19:37

## 2024-04-09 RX ADMIN — ONDANSETRON HYDROCHLORIDE 4 MG: 2 INJECTION, SOLUTION INTRAMUSCULAR; INTRAVENOUS at 22:10

## 2024-04-09 RX ADMIN — FENTANYL CITRATE 50 MCG: 50 INJECTION, SOLUTION INTRAMUSCULAR; INTRAVENOUS at 22:14

## 2024-04-09 RX ADMIN — MORPHINE SULFATE 2 MG: 2 INJECTION, SOLUTION INTRAMUSCULAR; INTRAVENOUS at 08:46

## 2024-04-09 RX ADMIN — CEFAZOLIN 2 G: 1 INJECTION, POWDER, FOR SOLUTION INTRAMUSCULAR; INTRAVENOUS; PARENTERAL at 22:05

## 2024-04-09 RX ADMIN — DEXMEDETOMIDINE HYDROCHLORIDE 4 MCG: 100 INJECTION, SOLUTION, CONCENTRATE INTRAVENOUS at 22:16

## 2024-04-09 RX ADMIN — MIDAZOLAM HYDROCHLORIDE 2 MG: 1 INJECTION, SOLUTION INTRAMUSCULAR; INTRAVENOUS at 21:48

## 2024-04-09 RX ADMIN — SODIUM CHLORIDE 500 ML: 9 INJECTION, SOLUTION INTRAVENOUS at 06:10

## 2024-04-09 RX ADMIN — DEXMEDETOMIDINE HYDROCHLORIDE 4 MCG: 100 INJECTION, SOLUTION, CONCENTRATE INTRAVENOUS at 22:03

## 2024-04-09 RX ADMIN — DEXAMETHASONE SODIUM PHOSPHATE 8 MG: 4 INJECTION, SOLUTION INTRAMUSCULAR; INTRAVENOUS at 21:58

## 2024-04-09 RX ADMIN — MORPHINE SULFATE 2 MG: 2 INJECTION, SOLUTION INTRAMUSCULAR; INTRAVENOUS at 19:37

## 2024-04-09 RX ADMIN — PROPOFOL 50 MG: 10 INJECTION, EMULSION INTRAVENOUS at 21:53

## 2024-04-09 RX ADMIN — MORPHINE SULFATE 4 MG: 4 INJECTION INTRAVENOUS at 04:06

## 2024-04-09 RX ADMIN — MORPHINE SULFATE 4 MG: 4 INJECTION INTRAVENOUS at 06:11

## 2024-04-09 RX ADMIN — DIPHENHYDRAMINE HYDROCHLORIDE 12.5 MG: 50 INJECTION INTRAMUSCULAR; INTRAVENOUS at 22:05

## 2024-04-09 RX ADMIN — DEXMEDETOMIDINE HYDROCHLORIDE 4 MCG: 100 INJECTION, SOLUTION, CONCENTRATE INTRAVENOUS at 22:13

## 2024-04-09 RX ADMIN — ONDANSETRON 4 MG: 2 INJECTION INTRAMUSCULAR; INTRAVENOUS at 08:45

## 2024-04-09 ASSESSMENT — PAIN SCALES - GENERAL
PAINLEVEL_OUTOF10: 10
PAINLEVEL_OUTOF10: 7
PAINLEVEL_OUTOF10: 7
PAINLEVEL_OUTOF10: 8
PAINLEVEL_OUTOF10: 7

## 2024-04-09 ASSESSMENT — PAIN DESCRIPTION - LOCATION
LOCATION: ABDOMEN

## 2024-04-09 ASSESSMENT — PAIN DESCRIPTION - PAIN TYPE: TYPE: ACUTE PAIN

## 2024-04-09 ASSESSMENT — PAIN DESCRIPTION - FREQUENCY: FREQUENCY: CONTINUOUS

## 2024-04-09 ASSESSMENT — LIFESTYLE VARIABLES
HOW MANY STANDARD DRINKS CONTAINING ALCOHOL DO YOU HAVE ON A TYPICAL DAY: 1 OR 2
HOW OFTEN DO YOU HAVE A DRINK CONTAINING ALCOHOL: MONTHLY OR LESS

## 2024-04-09 ASSESSMENT — PAIN DESCRIPTION - DESCRIPTORS: DESCRIPTORS: OTHER (COMMENT)

## 2024-04-09 ASSESSMENT — PAIN - FUNCTIONAL ASSESSMENT: PAIN_FUNCTIONAL_ASSESSMENT: 0-10

## 2024-04-09 NOTE — PROGRESS NOTES
Urology    MD unable to get to case during normal business hours. Per MD, not acute for on call MD this evening. The surgical center at [facility] was able to schedule tomorrow, 04/10/24 at 8:20 with Dr. Montero. Discussed with patient and  who is not happy regarding cancellation. Options reviewed of inpatient status with stent tomorrow vs outpatient tomorrow at surgical center. Opted for surgical center as long as sent home with pain control. Patient aware to return to ED with worsening symptoms and associated fevers/chills.    Case reviewed with Dr. Ring and Dr. Bran    7349 update:   Patient now scheduled for cystoscopy and left ureteral stent placement this evening with Dr. Pretty. Patient and ER MD made aware. Continue NPO status. Please obtain consent. Please admit to hospitalist service.

## 2024-04-09 NOTE — PROGRESS NOTES
TRANSFER - IN REPORT:    Verbal report received from ED Sanon on Betina Gonzalez  being received from FT #06 for ordered procedure      Report consisted of patient's Situation, Background, Assessment and   Recommendations(SBAR).     Information from the following report(s) Nurse Handoff Report, MAR, and Recent Results was reviewed with the receiving nurse.    Opportunity for questions and clarification was provided.      Assessment completed upon patient's arrival to unit and care assumed.

## 2024-04-09 NOTE — ED PROVIDER NOTES
[facility] 3 SURG TELE  EMERGENCY DEPARTMENT ENCOUNTER       Pt Name: Betina Gonzalez  MRN: [id number]  Birthdate 1960  Date of evaluation: 2024  Provider: Alcon Donaldson DO   PCP: Westley Chavez MD  Note Started: 3:46 AM EDT 24     CHIEF COMPLAINT       Chief Complaint   Patient presents with    Abdominal Pain     Pt ambulatory into triage. Reports she has hx of diverticulitis and colon resection. Reports abd pain around 0000. States yesterday afternoon she felt \"gassy\". Reports she feels bloated. Last BM . Reports nausea. Denies taking pain meds PTA. Took Xifaxan 550mg around 0100.         HISTORY OF PRESENT ILLNESS: 1 or more elements      History From: Patient, History limited by: none     Betina Gonzalez is a 63 y.o. female presenting emergency department complaining of abdominal pain, started around midnight, pain was initially described as feeling gassy, bloated and some nausea, pain became progressively worse.  No fever.  Had a bowel movement yesterday.  No urinary symptoms.       Please See MDM for Additional Details of the HPI/PMH  Nursing Notes were all reviewed and agreed with or any disagreements were addressed in the HPI.     REVIEW OF SYSTEMS        Positives and Pertinent negatives as per HPI.    PAST HISTORY     Past Medical History:  Past Medical History:   Diagnosis Date    Chronic kidney disease     Chronic pain     sciatic nerve-left    COVID     --hospitalized pneumonia    Diverticulosis     GERD (gastroesophageal reflux disease)     Hypertension     Renal carcinoma, right (HCC)     kidney    Sleep apnea     cpap       Past Surgical History:  Past Surgical History:   Procedure Laterality Date    CERVICAL DISCECTOMY  2003     SECTION   and     CHOLECYSTECTOMY, LAPAROSCOPIC      CT BIOPSY RENAL  2017    CT BIOPSY RENAL 2017 [facility] RAD CT    GYN      ablation    GYN  2015    hysterectomy    HERNIA REPAIR  2021    ROBOTIC ASSISTED LAPAROSCOPIC

## 2024-04-09 NOTE — CONSULTS
or hernia.  ADDITIONAL COMMENTS: N/A    - Impression -  13 mm stone left renal pelvis with minimal hydronephrosis. Status post right  nephrectomy, cholecystectomy, and hysterectomy. Hepatic steatosis. No acute  abnormality.    US Result (most recent):  No results found for this or any previous visit from the past 3650 days.    Cultures   [unfilled]     Past History: (Complete 2+/3 areas)     Allergies   Allergen Reactions    Iodinated Contrast Media Other (See Comments)     Unable to have contrast dye - only has one kidney      No current facility-administered medications for this encounter.     Current Outpatient Medications   Medication Sig    OZEMPIC, 1 MG/DOSE, 4 MG/3ML SOPN INJECT 1 MG SUBCUTANEOUSLY EVERY WEEK FOR 90 DAYS    buPROPion (WELLBUTRIN XL) 150 MG extended release tablet Take 1 tablet by mouth    famotidine (PEPCID) 40 MG tablet Take 1 tablet by mouth    hydroCHLOROthiazide (HYDRODIURIL) 25 MG tablet Take 1 tablet by mouth daily    Prior to Admission medications    Medication Sig Start Date End Date Taking? Authorizing Provider   OZEMPIC, 1 MG/DOSE, 4 MG/3ML SOPN INJECT 1 MG SUBCUTANEOUSLY EVERY WEEK FOR 90 DAYS 23   Provider, MD Lacie   buPROPion (WELLBUTRIN XL) 150 MG extended release tablet Take 1 tablet by mouth    Automatic Reconciliation, Ar   famotidine (PEPCID) 40 MG tablet Take 1 tablet by mouth    Automatic Reconciliation, Ar   hydroCHLOROthiazide (HYDRODIURIL) 25 MG tablet Take 1 tablet by mouth daily    Automatic Reconciliation, Ar        PMHx:  has a past medical history of Chronic kidney disease, Chronic pain, COVID, Diverticulosis, GERD (gastroesophageal reflux disease), Hypertension, Renal carcinoma, right (HCC), and Sleep apnea.   PSurgHx:  has a past surgical history that includes CT BIOPSY RENAL (2017);  section ( and ); gyn; gyn (); Cervical discectomy (); Cholecystectomy, laparoscopic (); Rotator cuff repair (Right, ); tumor

## 2024-04-09 NOTE — ED NOTES
Patient stable GCS15  Iv cannula intact   Iv fluids started at 100cc/hr infusing well   Medications given   Needs attended

## 2024-04-09 NOTE — H&P
Hospitalist Admission Note    NAME:   Betina Gonzalez   : 1960   MRN: [id number]     Date/Time: 2024 7:04 PM    Patient PCP: Westley Chavez MD    ______________________________________________________________________  Given the patient's current clinical presentation, I have a high level of concern for decompensation if discharged from the emergency department.  Complex decision making was performed, which includes reviewing the patient's available past medical records, laboratory results, and x-ray films.       My assessment of this patient's clinical condition and my plan of care is as follows.    Assessment / Plan:    Left renal stone with hydronephrosis  Sepsis (tachycardia and leukocytosis due to above)  -Urology is planning on taking the patient to the OR for cystoscopy and stent placement this evening.  -Keep NPO.  IV fluids.  Pain control with oxycodone and morphine.  Start empiric ceftriaxone.  Send urine culture results.  Check lactic acid.    CKD slightly elevated creatinine than baseline due to postrenal obstruction  -Baseline creatinine is 1.  Creatinine now is 1.4.  Due to hydronephrosis.  -Start IV fluids with normal saline.  Repeat BMP in a.m. tomorrow    Hypertension  Obesity  History of renal cancer status post right nephrectomy  Left-sided sciatica  Obstructive sleep apnea  GERD  -Hold hydrochlorothiazide due to elevated creatinine.  Currently blood pressure is controlled.  Consider starting on Norvasc if hide  -Resume PTA famotidine                    Medical Decision Making:   I personally reviewed labs: CBC, BMP  I personally reviewed imaging: CT abdomen  I personally reviewed EKG:  Toxic drug monitoring:   Discussed case with: ED provider. After discussion I am in agreement that acuity of patient's medical condition necessitates hospital stay.      Code Status: Full code  DVT Prophylaxis: CDs  Baseline:     Subjective:   CHIEF COMPLAINT: Left flank pain    HISTORY OF PRESENT

## 2024-04-09 NOTE — ED NOTES
Report given to ED Guillermo and Derian RN.  Nurse was informed of reason for arrival, vitals, labs, medications, orders, procedures, results, anything left pending and current plan of action. Questions were asked and received prior to departure from the patient.

## 2024-04-09 NOTE — ANESTHESIA PRE PROCEDURE
Incisional hernia, without obstruction or gangrene K43.2    Diverticulitis of large intestine with perforation without bleeding K57.20    Diverticulitis K57.92    Acute respiratory failure with hypoxia ([facility]) J96.01    CELSA (acute kidney injury) (HCC) N17.9    Severe obesity with body mass index (BMI) of 35.0 to 39.9 with serious comorbidity ([facility]) E66.01    Incisional hernia K43.2    Sciatica of left side M54.32    Fatigue R53.83    Essential hypertension I10    Lumbosacral spondylosis without myelopathy M47.817    Renal cell carcinoma (HCC) C64.9    Gastroesophageal reflux disease K21.9       Past Medical History:        Diagnosis Date    Chronic kidney disease     Chronic pain     sciatic nerve-left    COVID     --hospitalized pneumonia    Diverticulosis     GERD (gastroesophageal reflux disease)     Hypertension     Renal carcinoma, right (HCC)     kidney    Sleep apnea     cpap       Past Surgical History:        Procedure Laterality Date    CERVICAL DISCECTOMY       SECTION   and     CHOLECYSTECTOMY, LAPAROSCOPIC      CT BIOPSY RENAL  2017    CT BIOPSY RENAL 2017 [facility] RAD CT    GYN      ablation    GYN      hysterectomy    HERNIA REPAIR  2021    ROBOTIC ASSISTED LAPAROSCOPIC INCISIONAL HERNIA REPAIR  WITH MESH, messenteric biopsy     KIDNEY REMOVAL Right 2018    ORTHOPEDIC SURGERY Left     knee surgery x 3    ORTHOPEDIC SURGERY      Neck Surgery.     CA UNLISTED PROCEDURE ABDOMEN PERITONEUM & OMENTUM      colon resection    ROTATOR CUFF REPAIR Right 2006    TUMOR REMOVAL  2018    R Kidney        Social History:    Social History     Tobacco Use    Smoking status: Never    Smokeless tobacco: Never    Tobacco comments:     Quit smoking: as teenager briefly   Substance Use Topics    Alcohol use: Yes                                Counseling given: Not Answered  Tobacco comments: Quit smoking: as teenager briefly      Vital Signs (Current):   Vitals:

## 2024-04-10 LAB
ANION GAP SERPL CALC-SCNC: 9 MMOL/L (ref 5–15)
BASOPHILS # BLD: 0 K/UL (ref 0–0.1)
BASOPHILS NFR BLD: 0 % (ref 0–1)
BUN SERPL-MCNC: 12 MG/DL (ref 6–20)
BUN/CREAT SERPL: 10 (ref 12–20)
CALCIUM SERPL-MCNC: 8.9 MG/DL (ref 8.5–10.1)
CHLORIDE SERPL-SCNC: 105 MMOL/L (ref 97–108)
CO2 SERPL-SCNC: 25 MMOL/L (ref 21–32)
CREAT SERPL-MCNC: 1.16 MG/DL (ref 0.55–1.02)
DIFFERENTIAL METHOD BLD: ABNORMAL
EOSINOPHIL # BLD: 0 K/UL (ref 0–0.4)
EOSINOPHIL NFR BLD: 0 % (ref 0–7)
ERYTHROCYTE [DISTWIDTH] IN BLOOD BY AUTOMATED COUNT: 13.2 % (ref 11.5–14.5)
GLUCOSE SERPL-MCNC: 134 MG/DL (ref 65–100)
HCT VFR BLD AUTO: 41.9 % (ref 35–47)
HGB BLD-MCNC: 14.6 G/DL (ref 11.5–16)
IMM GRANULOCYTES # BLD AUTO: 0 K/UL (ref 0–0.04)
IMM GRANULOCYTES NFR BLD AUTO: 0 % (ref 0–0.5)
LYMPHOCYTES # BLD: 0.9 K/UL (ref 0.8–3.5)
LYMPHOCYTES NFR BLD: 8 % (ref 12–49)
MCH RBC QN AUTO: 30.9 PG (ref 26–34)
MCHC RBC AUTO-ENTMCNC: 34.8 G/DL (ref 30–36.5)
MCV RBC AUTO: 88.8 FL (ref 80–99)
MONOCYTES # BLD: 0.2 K/UL (ref 0–1)
MONOCYTES NFR BLD: 2 % (ref 5–13)
NEUTS SEG # BLD: 10.3 K/UL (ref 1.8–8)
NEUTS SEG NFR BLD: 90 % (ref 32–75)
NRBC # BLD: 0 K/UL (ref 0–0.01)
NRBC BLD-RTO: 0 PER 100 WBC
PLATELET # BLD AUTO: 255 K/UL (ref 150–400)
PMV BLD AUTO: 9.1 FL (ref 8.9–12.9)
POTASSIUM SERPL-SCNC: 4.2 MMOL/L (ref 3.5–5.1)
RBC # BLD AUTO: 4.72 M/UL (ref 3.8–5.2)
SODIUM SERPL-SCNC: 139 MMOL/L (ref 136–145)
WBC # BLD AUTO: 11.5 K/UL (ref 3.6–11)

## 2024-04-10 PROCEDURE — 6370000000 HC RX 637 (ALT 250 FOR IP): Performed by: NURSE PRACTITIONER

## 2024-04-10 PROCEDURE — 1100000003 HC PRIVATE W/ TELEMETRY

## 2024-04-10 PROCEDURE — 6370000000 HC RX 637 (ALT 250 FOR IP): Performed by: INTERNAL MEDICINE

## 2024-04-10 PROCEDURE — 94760 N-INVAS EAR/PLS OXIMETRY 1: CPT

## 2024-04-10 PROCEDURE — 2700000000 HC OXYGEN THERAPY PER DAY

## 2024-04-10 PROCEDURE — 36415 COLL VENOUS BLD VENIPUNCTURE: CPT

## 2024-04-10 PROCEDURE — 6360000002 HC RX W HCPCS: Performed by: INTERNAL MEDICINE

## 2024-04-10 PROCEDURE — 80048 BASIC METABOLIC PNL TOTAL CA: CPT

## 2024-04-10 PROCEDURE — 0T778DZ DILATION OF LEFT URETER WITH INTRALUMINAL DEVICE, VIA NATURAL OR ARTIFICIAL OPENING ENDOSCOPIC: ICD-10-PCS | Performed by: UROLOGY

## 2024-04-10 PROCEDURE — 85025 COMPLETE CBC W/AUTO DIFF WBC: CPT

## 2024-04-10 PROCEDURE — BT1F1ZZ FLUOROSCOPY OF LEFT KIDNEY, URETER AND BLADDER USING LOW OSMOLAR CONTRAST: ICD-10-PCS | Performed by: UROLOGY

## 2024-04-10 PROCEDURE — 2580000003 HC RX 258: Performed by: INTERNAL MEDICINE

## 2024-04-10 RX ORDER — SENNA AND DOCUSATE SODIUM 50; 8.6 MG/1; MG/1
2 TABLET, FILM COATED ORAL 2 TIMES DAILY
Status: DISCONTINUED | OUTPATIENT
Start: 2024-04-10 | End: 2024-04-11 | Stop reason: HOSPADM

## 2024-04-10 RX ORDER — PHENAZOPYRIDINE HYDROCHLORIDE 100 MG/1
200 TABLET, FILM COATED ORAL
Status: DISPENSED | OUTPATIENT
Start: 2024-04-10 | End: 2024-04-11

## 2024-04-10 RX ADMIN — MORPHINE SULFATE 2 MG: 2 INJECTION, SOLUTION INTRAMUSCULAR; INTRAVENOUS at 02:33

## 2024-04-10 RX ADMIN — DOCUSATE SODIUM 50 MG AND SENNOSIDES 8.6 MG 2 TABLET: 8.6; 5 TABLET, FILM COATED ORAL at 11:46

## 2024-04-10 RX ADMIN — MORPHINE SULFATE 2 MG: 2 INJECTION, SOLUTION INTRAMUSCULAR; INTRAVENOUS at 13:09

## 2024-04-10 RX ADMIN — SODIUM CHLORIDE, PRESERVATIVE FREE 10 ML: 5 INJECTION INTRAVENOUS at 08:28

## 2024-04-10 RX ADMIN — SODIUM CHLORIDE: 9 INJECTION, SOLUTION INTRAVENOUS at 18:25

## 2024-04-10 RX ADMIN — DOCUSATE SODIUM 50 MG AND SENNOSIDES 8.6 MG 2 TABLET: 8.6; 5 TABLET, FILM COATED ORAL at 21:29

## 2024-04-10 RX ADMIN — PHENAZOPYRIDINE 200 MG: 100 TABLET ORAL at 11:46

## 2024-04-10 RX ADMIN — PHENAZOPYRIDINE 200 MG: 100 TABLET ORAL at 16:49

## 2024-04-10 RX ADMIN — FAMOTIDINE 40 MG: 20 TABLET, FILM COATED ORAL at 08:27

## 2024-04-10 RX ADMIN — OXYCODONE 2.5 MG: 5 TABLET ORAL at 08:27

## 2024-04-10 RX ADMIN — MORPHINE SULFATE 2 MG: 2 INJECTION, SOLUTION INTRAMUSCULAR; INTRAVENOUS at 21:29

## 2024-04-10 RX ADMIN — SODIUM CHLORIDE 1000 MG: 900 INJECTION INTRAVENOUS at 23:38

## 2024-04-10 RX ADMIN — TAMSULOSIN HYDROCHLORIDE 0.4 MG: 0.4 CAPSULE ORAL at 08:27

## 2024-04-10 RX ADMIN — SODIUM CHLORIDE: 9 INJECTION, SOLUTION INTRAVENOUS at 08:34

## 2024-04-10 RX ADMIN — SODIUM CHLORIDE 1000 MG: 900 INJECTION INTRAVENOUS at 00:17

## 2024-04-10 RX ADMIN — MORPHINE SULFATE 2 MG: 2 INJECTION, SOLUTION INTRAMUSCULAR; INTRAVENOUS at 06:36

## 2024-04-10 ASSESSMENT — PAIN SCALES - GENERAL
PAINLEVEL_OUTOF10: 7
PAINLEVEL_OUTOF10: 8
PAINLEVEL_OUTOF10: 7
PAINLEVEL_OUTOF10: 4
PAINLEVEL_OUTOF10: 7
PAINLEVEL_OUTOF10: 10
PAINLEVEL_OUTOF10: 6
PAINLEVEL_OUTOF10: 0

## 2024-04-10 ASSESSMENT — PAIN DESCRIPTION - ORIENTATION
ORIENTATION: LEFT
ORIENTATION: LEFT

## 2024-04-10 ASSESSMENT — PAIN DESCRIPTION - LOCATION
LOCATION: ABDOMEN
LOCATION: OTHER (COMMENT)
LOCATION: BACK
LOCATION: ABDOMEN
LOCATION: ABDOMEN

## 2024-04-10 ASSESSMENT — PAIN DESCRIPTION - DESCRIPTORS
DESCRIPTORS: BURNING;ACHING;THROBBING
DESCRIPTORS: PRESSURE

## 2024-04-10 NOTE — PLAN OF CARE
Problem: Discharge Planning  Goal: Discharge to home or other facility with appropriate resources  4/10/2024 0921 by Karolyn Oliveros RN  Outcome: Progressing  4/9/2024 2234 by Leanna Barrera RN  Outcome: Progressing     Problem: Pain  Goal: Verbalizes/displays adequate comfort level or baseline comfort level  4/10/2024 0921 by Karolyn Oliveros RN  Outcome: Progressing  4/9/2024 2234 by Leanna Barrera RN  Outcome: Progressing     Problem: Safety - Adult  Goal: Free from fall injury  Outcome: Progressing

## 2024-04-10 NOTE — OP NOTE
[facility]              [address] [place]  [address]                            OPERATIVE REPORT      PATIENT NAME: TAINSHA MERCER               : 1960  MED REC NO: [id number]                       ROOM: OR  ACCOUNT NO: [id number]                       ADMIT DATE: 2024  PROVIDER: Terry Pretty MD    DATE OF SERVICE:  2024    PREOPERATIVE DIAGNOSES:       1. Left solitary kidney.     2. Left ureteropelvic junction calculus.     3. Hydronephrosis.    POSTOPERATIVE DIAGNOSES:       1. Left solitary kidney.     2. Left ureteropelvic junction calculus.     3. Hydronephrosis.    PROCEDURES PERFORMED:       1. Cystoscopy.     2. Left retrograde pyelogram.     3. Left ureteral stent placement.    SURGEON:  Terry Pretty MD    ASSISTANT:  ***    ANESTHESIA:  General.    ESTIMATED BLOOD LOSS:  NA.    SPECIMENS REMOVED:  None.    INTRAOPERATIVE FINDINGS:  The patient was not noted to have an obvious radiopaque renal calculus; however, with retrograde pyelogram, she was noted to have mild hydronephrosis and with placement of the guidewire and stent, she was noted to have some cloudy efflux from the side.  The proximal curl of the stent was placed within the renal pelvis and there was a full curl on the bladder.     COMPLICATIONS:  None.    IMPLANTS:  ***    INDICATIONS:  This is a 63-year-old female with a history of solitary left kidney with an obstructing left proximal stone with mild hydronephrosis, who presents for cystoscopy, retrograde pyelogram, and left ureteral stent placement after all risks and benefits of the procedure including alternatives were discussed with her.    DESCRIPTION OF PROCEDURE:  After informed consent was obtained, the patient was taken to the operating room, and placed in the dorsal lithotomy position after anesthesia and preop antibiotics were administered.  Time-out procedure was performed, and the patient and the procedure

## 2024-04-10 NOTE — PROGRESS NOTES
End of Shift Note    Bedside shift change report given to Leanna WARD (oncoming nurse) by Karolyn Oliveros RN (offgoing nurse).  Report included the following information SBAR, Kardex, Intake/Output, and MAR    Shift worked:  7A-7P     Shift summary and any significant changes:     Patient complained of burning with urination relieved by Pyridium. Voiding negro urine with small sediment prior to Pyridium. Patient medicated for pain x3 this shift. No complaints of nausea or vomiting.      Concerns for physician to address:       Zone phone for oncoming shift:          Activity:     Number times ambulated in hallways past shift: 1  Number of times OOB to chair past shift: 3    Cardiac:   Cardiac Monitoring: No           Access:  Current line(s): PIV     Genitourinary:   Urinary status: voiding    Respiratory:      Chronic home O2 use?: NO  Incentive spirometer at bedside: YES       GI:     Current diet:  ADULT DIET; Regular  DIET ONE TIME MESSAGE;  Passing flatus: YES  Tolerating current diet: YES       Pain Management:   Patient states pain is manageable on current regimen: YES    Skin:     Interventions: increase time out of bed    Patient Safety:  Fall Score:    Interventions: gripper socks       Length of Stay:  Expected LOS: 2  Actual LOS: 1      Karolyn Oliveros, RN

## 2024-04-10 NOTE — FLOWSHEET NOTE
04/09/24 2230   Handoff   Communication Given Transfer Handoff   Handoff Given To Gali WARD   Handoff Received From Leandro WARD/ Danny MCMANUS   Handoff Communication Face to Face;At bedside   Time Handoff Given 2230

## 2024-04-10 NOTE — PROGRESS NOTES
Nursing contacted Nocturnist/cross cover provider and notified patient is asking for gingerale, already went down for cystoscopy and stent per nurse. No other concerns reported. VSS. Ordered clear liquid diet for now, nurse to clarify with dayshift in am. Patient denies any further complaints or concerns. No acute distress reported. Nursing to notify Hospitalist for further/continued concerns. Will remain available overnight for further concerns if nursing/patient needs. Will defer further evaluation/management to the day shift primary care team.    Non-billable note.

## 2024-04-10 NOTE — PROGRESS NOTES
End of Shift Note    Bedside shift change report given to Karolyn (oncoming nurse) by Leanna Barrera RN (offgoing nurse).  Report included the following information SBAR, Kardex, MAR, Accordion, and Recent Results    Shift worked:  7p-7a     Shift summary and any significant changes:     OR for cysto and left stent placement   Voiding tea colored urine, with c/o burning during urination   Concerns for physician to address:  none     Zone phone for oncoming shift:   [phone]           Length of Stay:  Expected LOS: 2  Actual LOS: 0      Leanna Barrera RN

## 2024-04-10 NOTE — PROGRESS NOTES
Patient: Betina Gonzalez MRN: [id number]  SSN: [id number]    YOB: 1960  Age: 63 y.o.  Sex: female        ADMITTED: 2024 to Ely Ramirez MD by Elfego Jessica MD for Kidney stone [N20.0]  Hydronephrosis of left kidney [N13.30]  Left renal stone [N20.0]  POD# 1 Day Post-Op Procedure(s):  CYSTOSCOPY LEFT URETERAL STENT INSERTION    Betina Gonzalez is doing fair complaining of some mild dysuria.  Discussed irritative symptoms associated with stent including urgency and frequency and dysuria.  Will off for Pyridium.  Discussed with patient will still need definitive stone treatment.    VSS, AF  WBC 11.2  Creatinine 1.16 [1.40].    Vitals: Temp (24hrs), Av °F (36.7 °C), Min:97.1 °F (36.2 °C), Max:99 °F (37.2 °C)    Blood pressure 123/78, pulse 77, temperature 97.7 °F (36.5 °C), temperature source Oral, resp. rate 16, height 1.6 m (5' 3\"), weight 93.4 kg (205 lb 14.6 oz), SpO2 94 %.    Intake and Output:   1901 - 04/10 0700  In: 1250 [I.V.:650]  Out: 900 [Urine:900]  No intake/output data recorded.  NIC Output lats 24 hrs: No data found.   NIC Output last 8 hrs: No data found.  BM over last 24 hrs: No data found.    Exam:   Gen: NAD  CV: extremities well perfused  Lungs: nonlabored respirations. Symmetric chest expansion  Ext: no edema  Abdomen: soft NTND no CVAT  : voiding  dysuria     Labs:  CBC:   Lab Results   Component Value Date/Time    WBC 11.5 04/10/2024 02:51 AM    HCT 41.9 04/10/2024 02:51 AM     04/10/2024 02:51 AM     BMP:   Lab Results   Component Value Date/Time     04/10/2024 02:51 AM    K 4.2 04/10/2024 02:51 AM     04/10/2024 02:51 AM    CO2 25 04/10/2024 02:51 AM    BUN 12 04/10/2024 02:51 AM     Cultures:   Results       Procedure Component Value Units Date/Time    Culture, Urine [[id number]]  (Abnormal) Collected: 24 0409    Order Status: Completed Specimen: Urine Updated: 04/10/24 0822     Special Requests --        NO SPECIAL

## 2024-04-10 NOTE — DISCHARGE INSTRUCTIONS
DISCHARGE INFORMATION    Patient: Betina Gonzalez MRN: [id number]  SSN: [id number]    YOB: 1960  Age: 63 y.o.  Sex: female              As a part of your procedure you have a ureteral stent in place which maintains proper drainage of your kidney. It bypasses any blockage from a kidney stone, or swelling within the ureter even if the stone was removed. Most patients still experience some discomfort with the stent, but not to the extent which was seen previously.    Pain Medications  You may have been given oral nacotics (Lortab, Percocet, Dilaudid, etc) for pain control. These should be used sparingly. You may not drive, work on these medications. Narcotics are constipating, and are counterproductive to resuming bowel function. When able to use over the counter Tylenol, this is a better choice.         Expected Symptoms from Stent  The stent rubs, so some blood in the urine will be seen. Note that this amount of blood can appear significant but is in reality very little.  When urinating expect to have increased discomfort in the back on the side of the stent. This comes from urine going from the bladder, up the ureter/stent and causing pressure/pain in the kidney area.  The stent can be quite irritating to the bladder and may cause symptoms of frequent/urgent urination or a sudden severe pain over the bladder with the urge to urinate. This is a bladder spasm, and if they are common another medication can help.  Many times strings are left attatched to the stent and come out though the urethra. This allows your doctor to remove the stent when appropriate without another procedure. In men it is usually taped to the penis, or sometime free floating. In women it is tucked in the vagina. Leave this string alone. Avoid all sexual activity with the stent/string.  If you experience fever > 101, uncontrolled pain/nauea/vomiting, or have continuous leakage of urine please contact our office.

## 2024-04-10 NOTE — BRIEF OP NOTE
Brief Postoperative Note      Patient: Betina Gonzalez  YOB: 1960  MRN: [id number]    Date of Procedure: 4/9/2024    Pre-Op Diagnosis Codes:     * Ureteral stone [N20.1]    Post-Op Diagnosis: Same       Procedure(s):  CYSTOSCOPY LEFT URETERAL STENT INSERTION    Surgeon(s):  Terry Pretty MD    Assistant:  * No surgical staff found *    Anesthesia: General    Estimated Blood Loss (mL): Minimal    Complications: None    Specimens:   * No specimens in log *    Implants:  Implant Name Type Inv. Item Serial No.  Lot No. LRB No. Used Action   STENT URET 6FR L24CM PERCFLX HYDR+ DBL PGTL THRD 2 - SNA  STENT URET 6FR L24CM PERCFLX HYDR+ DBL PGTL THRD 2 NA [employer] [facility]- [id number] Left 1 Implanted         Drains: * No LDAs found *    Findings    Other Findings: Stone not clearly radiopaque - mild hydronephrosis, cloudy efflux with wire and stent placement  Electronically signed by Terry Pretty MD on 4/9/2024 at 10:18 PM

## 2024-04-10 NOTE — CARE COORDINATION
Care Management Initial Assessment       RUR:  9%   Readmission? No  1st IM letter given? No  1st  letter given: No         04/10/24 1102   Service Assessment   Patient Orientation Alert and Oriented;Person;Place;Situation;Self   Cognition Alert   History Provided By Patient   Primary Caregiver Self   Support Systems Spouse/Significant Other;Children  (son and a daughter)   Patient's Healthcare Decision Maker is: Patient Declined (Legal Next of Kin Remains as Decision Maker)   PCP Verified by CM Yes  (Pt sees Dr. Chavez.)   Last Visit to PCP Within last 6 months   Prior Functional Level Independent in ADLs/IADLs   Current Functional Level Independent in ADLs/IADLs   Can patient return to prior living arrangement Yes   Ability to make needs known: Good   Family able to assist with home care needs: Yes  (spouse)   Would you like for me to discuss the discharge plan with any other family members/significant others, and if so, who? Yes  (spouse)   Financial Resources Other (Comment)  ([employer])   Community Resources None   Social/Functional History   Lives With Spouse   Type of Home House  (1 level 5 soham)   Home Equipment   (sleep apnea machine)   Active  Yes   Discharge Planning   Type of Residence House       CM spoke with pt to introduce self/role, verify demographics and complete initial assessment.  Pt lives with her spouse in a one level home with 5 soham.  Pt has a supportive son and daughter.  Pt denied a hx of HH, SNF or IPR.  Pt is not a .  Pt uses the [facility] [address].  Pt is an active .  Pt's spouse will transport at d/c.      Advance Care Planning     General Advance Care Planning (ACP) Conversation    Date of Conversation: 4/10/2024  Conducted with: Patient with Decision Making Capacity    Healthcare Decision Maker:    Primary Decision Maker: Maciej Gonzalez - Spouse - [phone]    Secondary Decision Maker: Riri Gonzalez - Child - [phone]    Supplemental (Other) Decision

## 2024-04-10 NOTE — PROGRESS NOTES
Hospitalist Progress Note    NAME:   Betina Gonzalez   : 1960   MRN: [id number]     Date/Time: 4/10/2024 8:17 AM  Patient PCP: Westley Chavez MD    Estimated discharge date:   Barriers: Pain control      Assessment / Plan:  Left renal stone with hydronephrosis status post stent placement on   Sepsis (tachycardia and leukocytosis due to above)  -Urology is planning on taking the patient to the OR for cystoscopy and stent placement this evening.  -Keep NPO.  IV fluids.  Pain control with oxycodone and morphine.  Start empiric ceftriaxone.  Send urine culture results.  Check lactic acid.  4/10: Patient got left ureteral stent yesterday.  She complains of pain and discomfort in the left flank.  She is getting IV morphine and oxycodone.  Will give her tomorrow and plan to discharge tomorrow.  I spoke with urology HAYDEE Perkins and patient is cleared from their standpoint.  Also started on Pyridium.     CELSA on CKD due to postrenal obstruction  -Baseline creatinine is 1.  Creatinine now is 1.4.  Due to hydronephrosis.  -Start IV fluids with normal saline.  Repeat BMP in a.m. tomorrow  4/10: Creatinine is 1.1 which is essentially back to baseline.    Constipation  04/10: Started on Senokot twice daily.     Hypertension  History of renal cancer status post right nephrectomy  Left-sided sciatica  Hepatic steatosis  GERD  Obesity  Obstructive sleep apnea  -Hold hydrochlorothiazide due to elevated creatinine.  Currently blood pressure is controlled.  Consider starting on Norvasc if hide  -Resume PTA famotidine        Medical Decision Making:   I personally reviewed labs: CBC, BMP  I personally reviewed imaging: CT abdomen pelvis  I personally reviewed EKG:  Toxic drug monitoring: Mental status/breathing while patient is getting opioids  Discussed case with: Patient, RN, urology        Code Status: Full code  DVT Prophylaxis: SCDs  GI Prophylaxis: Pepcid    Subjective:     Chief Complaint / Reason for

## 2024-04-11 VITALS
OXYGEN SATURATION: 95 % | HEART RATE: 80 BPM | RESPIRATION RATE: 16 BRPM | BODY MASS INDEX: 36.48 KG/M2 | DIASTOLIC BLOOD PRESSURE: 89 MMHG | HEIGHT: 63 IN | TEMPERATURE: 98.8 F | WEIGHT: 205.91 LBS | SYSTOLIC BLOOD PRESSURE: 121 MMHG

## 2024-04-11 LAB
ALBUMIN SERPL-MCNC: 3.3 G/DL (ref 3.5–5)
ALBUMIN/GLOB SERPL: 1.2 (ref 1.1–2.2)
ALP SERPL-CCNC: 105 U/L (ref 45–117)
ALT SERPL-CCNC: 151 U/L (ref 12–78)
ANION GAP SERPL CALC-SCNC: 5 MMOL/L (ref 5–15)
AST SERPL-CCNC: 50 U/L (ref 15–37)
BACTERIA SPEC CULT: ABNORMAL
BILIRUB SERPL-MCNC: 1.1 MG/DL (ref 0.2–1)
BUN SERPL-MCNC: 17 MG/DL (ref 6–20)
BUN/CREAT SERPL: 16 (ref 12–20)
CALCIUM SERPL-MCNC: 8.6 MG/DL (ref 8.5–10.1)
CC UR VC: ABNORMAL
CHLORIDE SERPL-SCNC: 110 MMOL/L (ref 97–108)
CO2 SERPL-SCNC: 26 MMOL/L (ref 21–32)
CREAT SERPL-MCNC: 1.06 MG/DL (ref 0.55–1.02)
ERYTHROCYTE [DISTWIDTH] IN BLOOD BY AUTOMATED COUNT: 13.3 % (ref 11.5–14.5)
GLOBULIN SER CALC-MCNC: 2.8 G/DL (ref 2–4)
GLUCOSE SERPL-MCNC: 103 MG/DL (ref 65–100)
HCT VFR BLD AUTO: 36.4 % (ref 35–47)
HGB BLD-MCNC: 12.7 G/DL (ref 11.5–16)
LACTATE SERPL-SCNC: 1.3 MMOL/L (ref 0.4–2)
MAGNESIUM SERPL-MCNC: 2 MG/DL (ref 1.6–2.4)
MCH RBC QN AUTO: 31 PG (ref 26–34)
MCHC RBC AUTO-ENTMCNC: 34.9 G/DL (ref 30–36.5)
MCV RBC AUTO: 88.8 FL (ref 80–99)
NRBC # BLD: 0 K/UL (ref 0–0.01)
NRBC BLD-RTO: 0 PER 100 WBC
PHOSPHATE SERPL-MCNC: 2.5 MG/DL (ref 2.6–4.7)
PLATELET # BLD AUTO: 242 K/UL (ref 150–400)
PMV BLD AUTO: 9.5 FL (ref 8.9–12.9)
POTASSIUM SERPL-SCNC: 3.8 MMOL/L (ref 3.5–5.1)
PROT SERPL-MCNC: 6.1 G/DL (ref 6.4–8.2)
RBC # BLD AUTO: 4.1 M/UL (ref 3.8–5.2)
SERVICE CMNT-IMP: ABNORMAL
SODIUM SERPL-SCNC: 141 MMOL/L (ref 136–145)
WBC # BLD AUTO: 10.7 K/UL (ref 3.6–11)

## 2024-04-11 PROCEDURE — 83735 ASSAY OF MAGNESIUM: CPT

## 2024-04-11 PROCEDURE — 6370000000 HC RX 637 (ALT 250 FOR IP): Performed by: INTERNAL MEDICINE

## 2024-04-11 PROCEDURE — 36415 COLL VENOUS BLD VENIPUNCTURE: CPT

## 2024-04-11 PROCEDURE — 84100 ASSAY OF PHOSPHORUS: CPT

## 2024-04-11 PROCEDURE — 83605 ASSAY OF LACTIC ACID: CPT

## 2024-04-11 PROCEDURE — 2580000003 HC RX 258: Performed by: INTERNAL MEDICINE

## 2024-04-11 PROCEDURE — 80053 COMPREHEN METABOLIC PANEL: CPT

## 2024-04-11 PROCEDURE — 6360000002 HC RX W HCPCS: Performed by: INTERNAL MEDICINE

## 2024-04-11 PROCEDURE — 85027 COMPLETE CBC AUTOMATED: CPT

## 2024-04-11 RX ORDER — LEVOFLOXACIN 750 MG/1
750 TABLET, FILM COATED ORAL DAILY
Qty: 7 TABLET | Refills: 0 | Status: SHIPPED | OUTPATIENT
Start: 2024-04-11 | End: 2024-04-18

## 2024-04-11 RX ORDER — OXYCODONE HYDROCHLORIDE 5 MG/1
5 TABLET ORAL EVERY 8 HOURS PRN
Qty: 3 TABLET | Refills: 0 | Status: SHIPPED | OUTPATIENT
Start: 2024-04-11 | End: 2024-04-13

## 2024-04-11 RX ORDER — PHENAZOPYRIDINE HYDROCHLORIDE 200 MG/1
200 TABLET, FILM COATED ORAL
Qty: 90 TABLET | Refills: 0 | Status: SHIPPED | OUTPATIENT
Start: 2024-04-11 | End: 2024-05-11

## 2024-04-11 RX ORDER — TAMSULOSIN HYDROCHLORIDE 0.4 MG/1
0.4 CAPSULE ORAL DAILY
Qty: 30 CAPSULE | Refills: 0 | Status: SHIPPED | OUTPATIENT
Start: 2024-04-11

## 2024-04-11 RX ADMIN — OXYCODONE 2.5 MG: 5 TABLET ORAL at 09:33

## 2024-04-11 RX ADMIN — SODIUM CHLORIDE: 9 INJECTION, SOLUTION INTRAVENOUS at 03:42

## 2024-04-11 RX ADMIN — SODIUM CHLORIDE, PRESERVATIVE FREE 10 ML: 5 INJECTION INTRAVENOUS at 09:17

## 2024-04-11 RX ADMIN — DOCUSATE SODIUM 50 MG AND SENNOSIDES 8.6 MG 2 TABLET: 8.6; 5 TABLET, FILM COATED ORAL at 09:16

## 2024-04-11 RX ADMIN — ONDANSETRON 4 MG: 2 INJECTION INTRAMUSCULAR; INTRAVENOUS at 09:33

## 2024-04-11 RX ADMIN — FAMOTIDINE 40 MG: 20 TABLET, FILM COATED ORAL at 09:16

## 2024-04-11 RX ADMIN — TAMSULOSIN HYDROCHLORIDE 0.4 MG: 0.4 CAPSULE ORAL at 09:16

## 2024-04-11 RX ADMIN — MORPHINE SULFATE 2 MG: 2 INJECTION, SOLUTION INTRAMUSCULAR; INTRAVENOUS at 03:14

## 2024-04-11 ASSESSMENT — PAIN DESCRIPTION - ORIENTATION: ORIENTATION: LEFT

## 2024-04-11 ASSESSMENT — PAIN DESCRIPTION - LOCATION
LOCATION: FLANK
LOCATION: BACK

## 2024-04-11 ASSESSMENT — PAIN DESCRIPTION - DESCRIPTORS: DESCRIPTORS: CRAMPING

## 2024-04-11 ASSESSMENT — PAIN SCALES - GENERAL
PAINLEVEL_OUTOF10: 7
PAINLEVEL_OUTOF10: 4

## 2024-04-11 NOTE — DISCHARGE SUMMARY
BUN 17   CREATININE 1.06*   GLUCOSE 103*   CALCIUM 8.6   PHOS 2.5*   MG 2.0     Recent Labs     04/11/24  0333   HGB 12.7   HCT 36.4   WBC 10.7          Discharge Medications:     Medication List        START taking these medications      levoFLOXacin 750 MG tablet  Commonly known as: Levaquin  Take 1 tablet by mouth daily for 7 days     oxyCODONE 5 MG immediate release tablet  Commonly known as: ROXICODONE  Take 1 tablet by mouth every 8 hours as needed for Pain for up to 3 doses. Max Daily Amount: 15 mg     phenazopyridine 200 MG tablet  Commonly known as: PYRIDIUM  Take 1 tablet by mouth 3 times daily (with meals)     tamsulosin 0.4 MG capsule  Commonly known as: FLOMAX  Take 1 capsule by mouth daily     UNABLE TO FIND  Liver function test            CONTINUE taking these medications      buPROPion 150 MG extended release tablet  Commonly known as: WELLBUTRIN XL     famotidine 40 MG tablet  Commonly known as: PEPCID     hydroCHLOROthiazide 25 MG tablet  Commonly known as: HYDRODIURIL     Ozempic (1 MG/DOSE) 4 MG/3ML Sopn  Generic drug: Semaglutide (1 MG/DOSE)               Where to Get Your Medications        These medications were sent to [facility]/pharmacy #[id number] - [place], [place] - [address] [phone] - F [phone] [address] [address] [facility] [address]      Phone: [phone]   levoFLOXacin 750 MG tablet  oxyCODONE 5 MG immediate release tablet  phenazopyridine 200 MG tablet  tamsulosin 0.4 MG capsule       Information about where to get these medications is not yet available    Ask your nurse or doctor about these medications  UNABLE TO FIND             DISPOSITION:    Home with Family:    x   Home with HH/PT/OT/RN:    SNF/LTC:    ALONSO:    OTHER:            Code status: Full code  Recommended diet: cardiac diet  Recommended activity: activity as tolerated  Wound care: See surgical/procedure care instructions      Follow up with:   PCP : Westley Chavez MD Kahn,

## 2024-04-11 NOTE — PROGRESS NOTES
End of Shift Note    Bedside shift change report given to Mily (oncoming nurse) by Leanna Barrera RN (offgoing nurse).  Report included the following information SBAR, Kardex, MAR, Recent Results, and Med Rec Status    Shift worked:  7p-7a     Shift summary and any significant changes:     Uneventful, IV occluded and removed, new one inserted     Concerns for physician to address:  Transition from IV pain meds to PO for discharge      [facility]   [phone]         Length of Stay:  Expected LOS: 2  Actual LOS: 2      Leanna Barrera RN

## 2024-04-11 NOTE — PLAN OF CARE
Pt d/c home with .  Instructions given to both and explanations provided; pt instructed to  prescriptions and to call for F/U urology appt. Pain is under control per patient. IVSL removed.             Problem: Discharge Planning  Goal: Discharge to home or other facility with appropriate resources  Outcome: Adequate for Discharge     Problem: Pain  Goal: Verbalizes/displays adequate comfort level or baseline comfort level  Outcome: Adequate for Discharge     Problem: Safety - Adult  Goal: Free from fall injury  Outcome: Adequate for Discharge  Flowsheets (Taken 4/11/2024 0915)  Free From Fall Injury: Instruct family/caregiver on patient safety     Problem: ABCDS Injury Assessment  Goal: Absence of physical injury  Outcome: Adequate for Discharge  Flowsheets (Taken 4/11/2024 0915)  Absence of Physical Injury: Implement safety measures based on patient assessment

## 2024-04-16 ENCOUNTER — HOSPITAL ENCOUNTER (OUTPATIENT)
Facility: HOSPITAL | Age: 64
Discharge: HOME | End: 2024-04-19
Payer: COMMERCIAL

## 2024-04-16 VITALS
WEIGHT: 198 LBS | HEIGHT: 63 IN | BODY MASS INDEX: 35.08 KG/M2 | RESPIRATION RATE: 12 BRPM | HEART RATE: 91 BPM | TEMPERATURE: 98.4 F

## 2024-04-16 LAB
EKG ATRIAL RATE: 73 BPM
EKG DIAGNOSIS: NORMAL
EKG P AXIS: 13 DEGREES
EKG P-R INTERVAL: 150 MS
EKG Q-T INTERVAL: 400 MS
EKG QRS DURATION: 84 MS
EKG QTC CALCULATION (BAZETT): 440 MS
EKG R AXIS: 49 DEGREES
EKG T AXIS: 22 DEGREES
EKG VENTRICULAR RATE: 73 BPM

## 2024-04-16 PROCEDURE — 93005 ELECTROCARDIOGRAM TRACING: CPT | Performed by: UROLOGY

## 2024-04-16 ASSESSMENT — PAIN DESCRIPTION - ORIENTATION: ORIENTATION: LEFT

## 2024-04-16 ASSESSMENT — PAIN DESCRIPTION - PAIN TYPE: TYPE: CHRONIC PAIN

## 2024-04-16 ASSESSMENT — PAIN - FUNCTIONAL ASSESSMENT: PAIN_FUNCTIONAL_ASSESSMENT: PREVENTS OR INTERFERES SOME ACTIVE ACTIVITIES AND ADLS

## 2024-04-16 ASSESSMENT — PAIN SCALES - GENERAL: PAINLEVEL_OUTOF10: 7

## 2024-04-16 ASSESSMENT — PAIN DESCRIPTION - LOCATION: LOCATION: FLANK

## 2024-04-16 NOTE — PERIOP NOTE
Surgical consent obtained and signed by patient.    
as after your surgery. Poorly managed blood sugar levels slow down wound healing and prevent you from healing completely.        Day of Procedure    Please park in the parking deck or any designated visitor parking lot.  Enter the facility through the Main Entrance of the hospital.  On the day of surgery, please provide the cell phone number of the person who will be waiting for you to the Patient Access representative at the time of registration.  Masks are highly recommended in the hospital, but not required.  Once your procedure and the immediate recovery period is completed, a nurse in the recovery area will contact your designated visitor to inform them of your room number or to otherwise review other pertinent information regarding your care.    Social distancing practices are strongly encouraged in waiting areas and the cafeteria.       The patient was contacted in person.   She verbalized understanding of all instructions does not need reinforcement.

## 2024-04-18 ENCOUNTER — APPOINTMENT (OUTPATIENT)
Facility: HOSPITAL | Age: 64
End: 2024-04-18
Attending: UROLOGY
Payer: COMMERCIAL

## 2024-04-18 ENCOUNTER — HOSPITAL ENCOUNTER (OUTPATIENT)
Facility: HOSPITAL | Age: 64
Setting detail: OUTPATIENT SURGERY
Discharge: HOME | End: 2024-04-18
Attending: UROLOGY | Admitting: UROLOGY
Payer: COMMERCIAL

## 2024-04-18 ENCOUNTER — ANESTHESIA EVENT (OUTPATIENT)
Facility: HOSPITAL | Age: 64
End: 2024-04-18
Payer: COMMERCIAL

## 2024-04-18 ENCOUNTER — ANESTHESIA (OUTPATIENT)
Facility: HOSPITAL | Age: 64
End: 2024-04-18
Payer: COMMERCIAL

## 2024-04-18 VITALS
BODY MASS INDEX: 35.08 KG/M2 | HEIGHT: 63 IN | WEIGHT: 197.97 LBS | RESPIRATION RATE: 11 BRPM | HEART RATE: 80 BPM | SYSTOLIC BLOOD PRESSURE: 118 MMHG | DIASTOLIC BLOOD PRESSURE: 79 MMHG | OXYGEN SATURATION: 96 % | TEMPERATURE: 98.3 F

## 2024-04-18 PROCEDURE — 6360000002 HC RX W HCPCS

## 2024-04-18 PROCEDURE — 7100000001 HC PACU RECOVERY - ADDTL 15 MIN: Performed by: UROLOGY

## 2024-04-18 PROCEDURE — 2720000010 HC SURG SUPPLY STERILE: Performed by: UROLOGY

## 2024-04-18 PROCEDURE — 3600000014 HC SURGERY LEVEL 4 ADDTL 15MIN: Performed by: UROLOGY

## 2024-04-18 PROCEDURE — 2580000003 HC RX 258: Performed by: UROLOGY

## 2024-04-18 PROCEDURE — C2617 STENT, NON-COR, TEM W/O DEL: HCPCS | Performed by: UROLOGY

## 2024-04-18 PROCEDURE — 3700000000 HC ANESTHESIA ATTENDED CARE: Performed by: UROLOGY

## 2024-04-18 PROCEDURE — 74420 UROGRAPHY RTRGR +-KUB: CPT

## 2024-04-18 PROCEDURE — 2709999900 HC NON-CHARGEABLE SUPPLY: Performed by: UROLOGY

## 2024-04-18 PROCEDURE — C1769 GUIDE WIRE: HCPCS | Performed by: UROLOGY

## 2024-04-18 PROCEDURE — C1894 INTRO/SHEATH, NON-LASER: HCPCS | Performed by: UROLOGY

## 2024-04-18 PROCEDURE — 6370000000 HC RX 637 (ALT 250 FOR IP): Performed by: ANESTHESIOLOGY

## 2024-04-18 PROCEDURE — 2580000003 HC RX 258

## 2024-04-18 PROCEDURE — 7100000000 HC PACU RECOVERY - FIRST 15 MIN: Performed by: UROLOGY

## 2024-04-18 PROCEDURE — 88300 SURGICAL PATH GROSS: CPT

## 2024-04-18 PROCEDURE — 6360000002 HC RX W HCPCS: Performed by: UROLOGY

## 2024-04-18 PROCEDURE — 3600000004 HC SURGERY LEVEL 4 BASE: Performed by: UROLOGY

## 2024-04-18 PROCEDURE — 3700000001 HC ADD 15 MINUTES (ANESTHESIA): Performed by: UROLOGY

## 2024-04-18 PROCEDURE — 82360 CALCULUS ASSAY QUANT: CPT

## 2024-04-18 PROCEDURE — 2580000003 HC RX 258: Performed by: ANESTHESIOLOGY

## 2024-04-18 PROCEDURE — 2500000003 HC RX 250 WO HCPCS

## 2024-04-18 PROCEDURE — 6360000002 HC RX W HCPCS: Performed by: ANESTHESIOLOGY

## 2024-04-18 DEVICE — URETERAL STENT
Type: IMPLANTABLE DEVICE | Site: KIDNEY | Status: FUNCTIONAL
Brand: CONTOUR™

## 2024-04-18 RX ORDER — SODIUM CHLORIDE 9 MG/ML
INJECTION, SOLUTION INTRAVENOUS PRN
Status: DISCONTINUED | OUTPATIENT
Start: 2024-04-18 | End: 2024-04-18 | Stop reason: HOSPADM

## 2024-04-18 RX ORDER — MIDAZOLAM HYDROCHLORIDE 1 MG/ML
INJECTION INTRAMUSCULAR; INTRAVENOUS PRN
Status: DISCONTINUED | OUTPATIENT
Start: 2024-04-18 | End: 2024-04-18 | Stop reason: SDUPTHER

## 2024-04-18 RX ORDER — HYDRALAZINE HYDROCHLORIDE 20 MG/ML
10 INJECTION INTRAMUSCULAR; INTRAVENOUS
Status: DISCONTINUED | OUTPATIENT
Start: 2024-04-18 | End: 2024-04-18 | Stop reason: HOSPADM

## 2024-04-18 RX ORDER — LIDOCAINE HYDROCHLORIDE 10 MG/ML
1 INJECTION, SOLUTION EPIDURAL; INFILTRATION; INTRACAUDAL; PERINEURAL
Status: DISCONTINUED | OUTPATIENT
Start: 2024-04-18 | End: 2024-04-18 | Stop reason: HOSPADM

## 2024-04-18 RX ORDER — SODIUM CHLORIDE 0.9 % (FLUSH) 0.9 %
5-40 SYRINGE (ML) INJECTION EVERY 12 HOURS SCHEDULED
Status: DISCONTINUED | OUTPATIENT
Start: 2024-04-18 | End: 2024-04-18 | Stop reason: HOSPADM

## 2024-04-18 RX ORDER — SODIUM CHLORIDE 0.9 % (FLUSH) 0.9 %
5-40 SYRINGE (ML) INJECTION PRN
Status: DISCONTINUED | OUTPATIENT
Start: 2024-04-18 | End: 2024-04-18 | Stop reason: HOSPADM

## 2024-04-18 RX ORDER — DEXAMETHASONE SODIUM PHOSPHATE 4 MG/ML
INJECTION, SOLUTION INTRA-ARTICULAR; INTRALESIONAL; INTRAMUSCULAR; INTRAVENOUS; SOFT TISSUE PRN
Status: DISCONTINUED | OUTPATIENT
Start: 2024-04-18 | End: 2024-04-18 | Stop reason: SDUPTHER

## 2024-04-18 RX ORDER — OXYCODONE HYDROCHLORIDE 5 MG/1
5 TABLET ORAL
Status: COMPLETED | OUTPATIENT
Start: 2024-04-18 | End: 2024-04-18

## 2024-04-18 RX ORDER — PHENYLEPHRINE HCL IN 0.9% NACL 0.4MG/10ML
SYRINGE (ML) INTRAVENOUS PRN
Status: DISCONTINUED | OUTPATIENT
Start: 2024-04-18 | End: 2024-04-18 | Stop reason: SDUPTHER

## 2024-04-18 RX ORDER — ACETAMINOPHEN 500 MG
1000 TABLET ORAL ONCE
Status: COMPLETED | OUTPATIENT
Start: 2024-04-18 | End: 2024-04-18

## 2024-04-18 RX ORDER — FENTANYL CITRATE 50 UG/ML
INJECTION, SOLUTION INTRAMUSCULAR; INTRAVENOUS PRN
Status: DISCONTINUED | OUTPATIENT
Start: 2024-04-18 | End: 2024-04-18 | Stop reason: SDUPTHER

## 2024-04-18 RX ORDER — MIDAZOLAM HYDROCHLORIDE 2 MG/2ML
2 INJECTION, SOLUTION INTRAMUSCULAR; INTRAVENOUS
Status: DISCONTINUED | OUTPATIENT
Start: 2024-04-18 | End: 2024-04-18 | Stop reason: HOSPADM

## 2024-04-18 RX ORDER — SODIUM CHLORIDE, SODIUM LACTATE, POTASSIUM CHLORIDE, CALCIUM CHLORIDE 600; 310; 30; 20 MG/100ML; MG/100ML; MG/100ML; MG/100ML
INJECTION, SOLUTION INTRAVENOUS CONTINUOUS
Status: DISCONTINUED | OUTPATIENT
Start: 2024-04-18 | End: 2024-04-18 | Stop reason: HOSPADM

## 2024-04-18 RX ORDER — NALOXONE HYDROCHLORIDE 0.4 MG/ML
INJECTION, SOLUTION INTRAMUSCULAR; INTRAVENOUS; SUBCUTANEOUS PRN
Status: DISCONTINUED | OUTPATIENT
Start: 2024-04-18 | End: 2024-04-18 | Stop reason: HOSPADM

## 2024-04-18 RX ORDER — ONDANSETRON 2 MG/ML
4 INJECTION INTRAMUSCULAR; INTRAVENOUS
Status: COMPLETED | OUTPATIENT
Start: 2024-04-18 | End: 2024-04-18

## 2024-04-18 RX ORDER — FENTANYL CITRATE 50 UG/ML
25 INJECTION, SOLUTION INTRAMUSCULAR; INTRAVENOUS EVERY 5 MIN PRN
Status: DISCONTINUED | OUTPATIENT
Start: 2024-04-18 | End: 2024-04-18 | Stop reason: HOSPADM

## 2024-04-18 RX ORDER — FENTANYL CITRATE 50 UG/ML
100 INJECTION, SOLUTION INTRAMUSCULAR; INTRAVENOUS
Status: DISCONTINUED | OUTPATIENT
Start: 2024-04-18 | End: 2024-04-18 | Stop reason: HOSPADM

## 2024-04-18 RX ORDER — PROCHLORPERAZINE EDISYLATE 5 MG/ML
5 INJECTION INTRAMUSCULAR; INTRAVENOUS
Status: DISCONTINUED | OUTPATIENT
Start: 2024-04-18 | End: 2024-04-18 | Stop reason: HOSPADM

## 2024-04-18 RX ORDER — ONDANSETRON 2 MG/ML
INJECTION INTRAMUSCULAR; INTRAVENOUS PRN
Status: DISCONTINUED | OUTPATIENT
Start: 2024-04-18 | End: 2024-04-18 | Stop reason: SDUPTHER

## 2024-04-18 RX ORDER — LIDOCAINE HYDROCHLORIDE 20 MG/ML
INJECTION, SOLUTION EPIDURAL; INFILTRATION; INTRACAUDAL; PERINEURAL PRN
Status: DISCONTINUED | OUTPATIENT
Start: 2024-04-18 | End: 2024-04-18 | Stop reason: SDUPTHER

## 2024-04-18 RX ORDER — HYDROMORPHONE HYDROCHLORIDE 1 MG/ML
0.5 INJECTION, SOLUTION INTRAMUSCULAR; INTRAVENOUS; SUBCUTANEOUS EVERY 5 MIN PRN
Status: COMPLETED | OUTPATIENT
Start: 2024-04-18 | End: 2024-04-18

## 2024-04-18 RX ORDER — FENTANYL CITRATE 50 UG/ML
INJECTION, SOLUTION INTRAMUSCULAR; INTRAVENOUS
Status: COMPLETED
Start: 2024-04-18 | End: 2024-04-18

## 2024-04-18 RX ADMIN — FENTANYL CITRATE 25 MCG: 50 INJECTION INTRAMUSCULAR; INTRAVENOUS at 16:10

## 2024-04-18 RX ADMIN — ONDANSETRON 4 MG: 2 INJECTION INTRAMUSCULAR; INTRAVENOUS at 17:13

## 2024-04-18 RX ADMIN — PHENYLEPHRINE HYDROCHLORIDE 20 MCG/MIN: 10 INJECTION INTRAVENOUS at 14:55

## 2024-04-18 RX ADMIN — ONDANSETRON 4 MG: 2 INJECTION INTRAMUSCULAR; INTRAVENOUS at 15:39

## 2024-04-18 RX ADMIN — Medication 80 MCG: at 14:52

## 2024-04-18 RX ADMIN — WATER 2000 MG: 1 INJECTION INTRAMUSCULAR; INTRAVENOUS; SUBCUTANEOUS at 14:43

## 2024-04-18 RX ADMIN — Medication 80 MCG: at 14:35

## 2024-04-18 RX ADMIN — FENTANYL CITRATE 25 MCG: 50 INJECTION, SOLUTION INTRAMUSCULAR; INTRAVENOUS at 15:14

## 2024-04-18 RX ADMIN — FENTANYL CITRATE 25 MCG: 50 INJECTION, SOLUTION INTRAMUSCULAR; INTRAVENOUS at 15:49

## 2024-04-18 RX ADMIN — FENTANYL CITRATE 25 MCG: 50 INJECTION INTRAMUSCULAR; INTRAVENOUS at 16:50

## 2024-04-18 RX ADMIN — OXYCODONE 5 MG: 5 TABLET ORAL at 17:17

## 2024-04-18 RX ADMIN — FENTANYL CITRATE 25 MCG: 50 INJECTION INTRAMUSCULAR; INTRAVENOUS at 16:24

## 2024-04-18 RX ADMIN — PROPOFOL 200 MG: 10 INJECTION, EMULSION INTRAVENOUS at 14:35

## 2024-04-18 RX ADMIN — LIDOCAINE HYDROCHLORIDE 100 MG: 20 INJECTION, SOLUTION EPIDURAL; INFILTRATION; INTRACAUDAL; PERINEURAL at 14:35

## 2024-04-18 RX ADMIN — DEXAMETHASONE SODIUM PHOSPHATE 4 MG: 4 INJECTION, SOLUTION INTRAMUSCULAR; INTRAVENOUS at 14:35

## 2024-04-18 RX ADMIN — MIDAZOLAM 2 MG: 1 INJECTION INTRAMUSCULAR; INTRAVENOUS at 14:28

## 2024-04-18 RX ADMIN — HYDROMORPHONE HYDROCHLORIDE 0.5 MG: 1 INJECTION, SOLUTION INTRAMUSCULAR; INTRAVENOUS; SUBCUTANEOUS at 16:54

## 2024-04-18 RX ADMIN — Medication 80 MCG: at 14:55

## 2024-04-18 RX ADMIN — HYDROMORPHONE HYDROCHLORIDE 0.5 MG: 1 INJECTION, SOLUTION INTRAMUSCULAR; INTRAVENOUS; SUBCUTANEOUS at 17:11

## 2024-04-18 RX ADMIN — SODIUM CHLORIDE, POTASSIUM CHLORIDE, SODIUM LACTATE AND CALCIUM CHLORIDE: 600; 310; 30; 20 INJECTION, SOLUTION INTRAVENOUS at 13:58

## 2024-04-18 RX ADMIN — FENTANYL CITRATE 50 MCG: 50 INJECTION, SOLUTION INTRAMUSCULAR; INTRAVENOUS at 14:53

## 2024-04-18 RX ADMIN — ACETAMINOPHEN 1000 MG: 500 TABLET ORAL at 14:03

## 2024-04-18 ASSESSMENT — PAIN DESCRIPTION - LOCATION
LOCATION: ABDOMEN
LOCATION: ANKLE
LOCATION: ABDOMEN

## 2024-04-18 ASSESSMENT — PAIN DESCRIPTION - FREQUENCY: FREQUENCY: INTERMITTENT

## 2024-04-18 ASSESSMENT — PAIN SCALES - GENERAL
PAINLEVEL_OUTOF10: 7
PAINLEVEL_OUTOF10: 5
PAINLEVEL_OUTOF10: 4
PAINLEVEL_OUTOF10: 6
PAINLEVEL_OUTOF10: 4
PAINLEVEL_OUTOF10: 6

## 2024-04-18 ASSESSMENT — PAIN DESCRIPTION - DESCRIPTORS
DESCRIPTORS: ACHING
DESCRIPTORS: BURNING;ACHING;SHARP;SHOOTING

## 2024-04-18 ASSESSMENT — PAIN DESCRIPTION - ORIENTATION: ORIENTATION: RIGHT

## 2024-04-18 ASSESSMENT — PAIN - FUNCTIONAL ASSESSMENT
PAIN_FUNCTIONAL_ASSESSMENT: ACTIVITIES ARE NOT PREVENTED
PAIN_FUNCTIONAL_ASSESSMENT: 0-10

## 2024-04-18 ASSESSMENT — PAIN DESCRIPTION - PAIN TYPE: TYPE: SURGICAL PAIN

## 2024-04-18 ASSESSMENT — PAIN DESCRIPTION - ONSET: ONSET: ON-GOING

## 2024-04-18 NOTE — H&P
Betina Gonzalez is a 63 year old female who presents today for \"Stone\". She returns for follow-up. Sees Dr. Levy for hx of ccRCC sp Nx in 2018.     4/2024 cysto, L stent for L UPJ stone in solitary kidney.     Bothered by stent.  Pressure, dysuria.  On levaquin, tamsulosin, azo;    Notes increase in ALT from 30s to 89.  Possibly from levaquin.    PAST MEDICAL HISTORY:    Allergies: No known allergies.    DENIES: Latex, Shellfish, X-Ray Dye, Iodine.     Medications: * FIBER FORMULA (FIBER) CAPS (FIBER) Take capsule by mouth once a day  omeprazole 40 mg capsule,delayed release (omeprazole) Take capsule by mouth as directed  tamsulosin 0.4 mg capsule (tamsulosin) Take 1 capsule by mouth once a day  phenazopyridine 200 mg tablet (phenazopyridine) Take 1 tablet by mouth three times a day  bupropion HCl  mg tablet,12 hr sustained-release (bupropion hcl) Take tablet by mouth once a day  Daily Probiotic (S. boulardii) 250 mg capsule (saccharomyces boulardii) Take capsule by mouth once a day  hydrochlorothiazide 25 mg tablet (hydrochlorothiazide) Take tablet by mouth as directed  levofloxacin 750 mg tablet (levofloxacin) Take 1 tablet by mouth once a day  Ozempic 2 mg/dose (8 mg/3 mL) subcutaneous pen injector (semaglutide) Inject subcutaneously as directed    Problems: Elevation of levels of liver transaminase levels (YUN16-Z23.01)  Acquired solitary kidney (ICD-V45.73) (SHF41-K82.5)  Obstructive sleep apnea syndrome (ICD-327.23) (WVF66-C40.33)  Hydronephrosis of left kidney (ICD-591) (AJS69-S00.30)  Essential hypertension (ICD-401.9) (MIL25-K87)  Female stress incontinence (ICD-625.6) (CGS49-L71.3)  History of renal carcinoma (ICD-V10.52) (PUG75-G06.528)  Elevated serum creatinine (ICD-794.4) (OSP47-S65.89)  Renal insufficiency (ICD-593.9) (TSB24-T76.9)  Renal cell carcinoma (ICD-189.0) (MOI96-L34.9)  Kidney stone on left side (ICD-592.0) (OLC58-P38.0)  Diverticulitis (ICD-562.11) (EEX76-P65.92)  Kidney mass  (ICD-593.9) (QEO57-O84.89)  PELVIC PAIN (ICD-625.9) (RWJ54-Y29.2)  LEFT FLANK PAIN (ICD-789.02) (PGP19-S84.12)  RIGHT FLANK PAIN (ICD-789.01) (JIV81-W39.11)  Acute cystitis without hematuria (ICD-595.0) (SIB83-Y36.00)  724.2 LOW BACK PAIN SYNDROME (ICD-724.2) (IQE97-H21.5)  789.01 RUQ PAIN (ICD-789.01) (QLY18-S77.11)  788.41 FREQUENCY, URINARY (ICD-788.41) (EYP71-L77.0)  599.0 URINARY TRACT INFECTION (ICD-599.0) (OFH75-P80.0)  599.7 HEMATURIA (ICD-599.70) (OBB79-W06.9)  SIMPLE RENAL CYST (ICD-593.2) (LNT56-A17.1)    Illnesses: High Blood Pressure, Kidney Problems, and Cancer.   DENIES: Heart Disease, Pacemaker/Defibrillator, Lung Disease, Diabetes, Stroke/Seizure, Bleeding Problems, HIV, or Hepatitis.     Surgeries: Kidney Surgery,Colon Surgery,,Hysterectomy, andOther Abdominal Surgery.       Family History: Kidney Cancer and Kidney Stones.   DENIES: Kidney disease, Breast cancer, Uterine cancer, Cervical cancer, Ovarian cancer.     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 Other. Smoking status: former smoker. Drinks alcohol 2 to 4 times a month.     System Review: Admits to: None.   DENIES: Unexplained Weight Loss, Dry Eyes, Dry Mouth, Leg Swelling, Shortness of Breath, Constipation, Involuntary Urine Loss, Lower Extremity Weakness, Dry Skin, Difficulty Walking, Psychiatric Problems, Impaired Sex Drive, Easy Bleeding, Rash.         URINALYSIS from Voided specimen  Urine Dip not done  Urine Micro: WBC: 0, RBC: 6-10, Bacteria: 0  Comment: Urine dipstick is non-diagnostic due to staining with pyridium like agent.     IMPRESSION:    1. ELEVATION OF LEVELS OF LIVER TRANSAMINASE LEVELS: Stop levaquin, will change based on ucx, requested from hospital records.     2. ACQUIRED SOLITARY KIDNEY (WOH77-M32.5)    3. KIDNEY STONE ON LEFT SIDE (QZP02-V39.0): We discussed the treatment options left ureteroscopy with laser lithotripsy.  We discussed the risk benefits alternatives of each.  She wants to do at

## 2024-04-18 NOTE — ANESTHESIA POSTPROCEDURE EVALUATION
Post-Anesthesia Evaluation and Assessment    Patient: Betina Gonzalez MRN: [id number]  SSN: [id number]    YOB: 1960  Age: 63 y.o.  Sex: female      I have evaluated the patient and they are stable and ready for discharge from the PACU.     Cardiovascular Function/Vital Signs  Visit Vitals  /80   Pulse 82   Temp 98 °F (36.7 °C) (Oral)   Resp 12   Ht 1.6 m (5' 3\")   Wt 89.8 kg (197 lb 15.6 oz)   SpO2 97%   BMI 35.07 kg/m²       Patient is status post General anesthesia for Procedure(s):  LEFT CYSTOSCOPY,, LEFT URETEROSCOPY WITH LASER, POSSIBLE LEFT STENT PLACEMENT.    Nausea/Vomiting: None    Postoperative hydration reviewed and adequate.    Pain:  Managed    Neurological Status:   At baseline    Mental Status, Level of Consciousness: Alert and  oriented to person, place, and time    Pulmonary Status:   Adequate oxygenation and airway patent    Complications related to anesthesia: None    Post-anesthesia assessment completed. No concerns    Signed By: Alejo Klein MD     April 18, 2024

## 2024-04-18 NOTE — ANESTHESIA PRE PROCEDURE
Department of Anesthesiology  Preprocedure Note       Name:  Betina Gonzalez   Age:  63 y.o.  :  1960                                          MRN:  [id number]         Date:  2024      Surgeon: Surgeon(s):  Mohsen Patel MD    Procedure: Procedure(s):  LEFT CYSTOSCOPY, LEFT RETROGRADE, LEFT URETEROSCOPY WITH LASER, POSSIBLE LEFT STENT PLACEMENT    Medications prior to admission:   Prior to Admission medications    Medication Sig Start Date End Date Taking? Authorizing Provider   tamsulosin (FLOMAX) 0.4 MG capsule Take 1 capsule by mouth daily 24   Ely Ramirez MD   buPROPion (WELLBUTRIN XL) 150 MG extended release tablet Take 1 tablet by mouth every morning    Automatic Reconciliation, Ar   famotidine (PEPCID) 40 MG tablet Take 1 tablet by mouth Daily    Automatic Reconciliation, Ar   hydroCHLOROthiazide (HYDRODIURIL) 25 MG tablet Take 1 tablet by mouth daily    Automatic Reconciliation, Ar       Current medications:    Current Facility-Administered Medications   Medication Dose Route Frequency Provider Last Rate Last Admin    ceFAZolin (ANCEF) 2,000 mg in sterile water 20 mL IV syringe  2,000 mg IntraVENous Once Mohsen Patel MD        lidocaine PF 1 % injection 1 mL  1 mL IntraDERmal Once PRN Alejo Klein MD        fentaNYL (SUBLIMAZE) injection 100 mcg  100 mcg IntraVENous Once PRN Alejo Klein MD        lactated ringers IV soln infusion   IntraVENous Continuous Alejo Klein  mL/hr at 24 1358 New Bag at 24 1358    sodium chloride flush 0.9 % injection 5-40 mL  5-40 mL IntraVENous 2 times per day Alejo Klein MD        sodium chloride flush 0.9 % injection 5-40 mL  5-40 mL IntraVENous PRN Alejo Klein MD        0.9 % sodium chloride infusion   IntraVENous PRN Alejo Klein MD        midazolam PF (VERSED) injection 2 mg  2 mg IntraVENous Once PRN Alejo Klein MD           Allergies:    Allergies   Allergen

## 2024-04-19 NOTE — OP NOTE
[address]  [address]                            OPERATIVE REPORT      PATIENT NAME: TANISHA MERCER               : 1960  MED REC NO: [id number]                       ROOM: OR  ACCOUNT NO: [id number]                       ADMIT DATE: 2024  PROVIDER: Mohsen Patel MD    DATE OF SERVICE:  2024    PREOPERATIVE DIAGNOSES:  Left renal stone, 6 mm.    POSTOPERATIVE DIAGNOSES:  Several left renal stones totaling 6 mm.    PROCEDURES PERFORMED:  Cystoscopy, left flexible ureteroscopy with holmium laser lithotripsy, stone basket extraction-extended services, placement of 6-Iranian x 22 cm left double-J ureteral stent-string to the vagina.    SURGEON:  Mohsen Patel MD      ANESTHESIA:  GETA.    ESTIMATED BLOOD LOSS:  None.    SPECIMENS REMOVED:  Stone for analysis.     DESCRIPTION OF PROCEDURE:  After the patient was correctly identified and informed consent obtained, preoperative antibiotics were given.  The patient, procedure, and site were confirmed with time-out.  Anesthesia was undertaken.  She was repositioned in lithotomy.  Perineum and genitalia were prepped and draped in routine sterile fashion.    Cystoscope inserted per urethra.  The stent was grasped using alligator forceps and withdrawn to the meatus.  I wired through the stent threading the wire to the kidney.  The stent was offloaded.  The wire was secured with safety wire.  I did not have access to a 4.5 mm ureteroscope and did not want to risk damaging the ureter.  Therefore, I took an 11/13 x 36 cm ureteral access sheath and advanced over the wire to the renal pelvis.  The inner obturator and wire were removed.  Upon entering the renal pelvis, several matrix appearing stones which were fluffy and only minimally solid were encountered.  The laser was used to shred these into manageable pieces, and then, I spent about an hour going through and basketing and removing this

## 2024-04-27 LAB
CARBONATE APATITE: 50 %
COLOR STONE: NORMAL
LABORATORY COMMENT REPORT: NORMAL
LABORATORY COMMENT REPORT: NORMAL
Lab: NORMAL
Lab: NORMAL
PHOTO: NORMAL
SIZE STONE: NORMAL MM
SPECIMEN SOURCE: NORMAL
STONE ANALYSIS-IMP: NORMAL
STONE COMPOSITION: NORMAL
TRI-PHOS MFR STONE: 50 %
WT STONE: 43 MG

## 2024-05-01 ENCOUNTER — TRANSCRIBE ORDERS (OUTPATIENT)
Facility: HOSPITAL | Age: 64
End: 2024-05-01

## 2024-05-01 ENCOUNTER — HOSPITAL ENCOUNTER (OUTPATIENT)
Facility: HOSPITAL | Age: 64
Discharge: HOME | End: 2024-05-04
Attending: UROLOGY
Payer: COMMERCIAL

## 2024-05-01 DIAGNOSIS — N13.30 HYDRONEPHROSIS, UNSPECIFIED HYDRONEPHROSIS TYPE: ICD-10-CM

## 2024-05-01 DIAGNOSIS — N13.30 HYDRONEPHROSIS, UNSPECIFIED HYDRONEPHROSIS TYPE: Primary | ICD-10-CM

## 2024-05-01 PROCEDURE — 76770 US EXAM ABDO BACK WALL COMP: CPT

## 2024-08-08 ENCOUNTER — APPOINTMENT (OUTPATIENT)
Facility: HOSPITAL | Age: 64
End: 2024-08-08
Payer: COMMERCIAL

## 2024-08-08 ENCOUNTER — HOSPITAL ENCOUNTER (EMERGENCY)
Facility: HOSPITAL | Age: 64
Discharge: HOME | End: 2024-08-08
Attending: EMERGENCY MEDICINE
Payer: COMMERCIAL

## 2024-08-08 VITALS
BODY MASS INDEX: 35.26 KG/M2 | WEIGHT: 199 LBS | HEART RATE: 82 BPM | RESPIRATION RATE: 16 BRPM | HEIGHT: 63 IN | SYSTOLIC BLOOD PRESSURE: 153 MMHG | OXYGEN SATURATION: 98 % | TEMPERATURE: 98.1 F | DIASTOLIC BLOOD PRESSURE: 101 MMHG

## 2024-08-08 DIAGNOSIS — R10.13 ABDOMINAL PAIN, EPIGASTRIC: Primary | ICD-10-CM

## 2024-08-08 LAB
ALBUMIN SERPL-MCNC: 3.8 G/DL (ref 3.5–5)
ALBUMIN/GLOB SERPL: 1.4 (ref 1.1–2.2)
ALP SERPL-CCNC: 91 U/L (ref 45–117)
ALT SERPL-CCNC: 30 U/L (ref 12–78)
ANION GAP SERPL CALC-SCNC: 7 MMOL/L (ref 5–15)
APPEARANCE UR: CLEAR
AST SERPL-CCNC: 30 U/L (ref 15–37)
BACTERIA URNS QL MICRO: NEGATIVE /HPF
BASOPHILS # BLD: 0.1 K/UL (ref 0–0.1)
BASOPHILS NFR BLD: 1 % (ref 0–1)
BILIRUB SERPL-MCNC: 1.6 MG/DL (ref 0.2–1)
BILIRUB UR QL: NEGATIVE
BUN SERPL-MCNC: 14 MG/DL (ref 6–20)
BUN/CREAT SERPL: 14 (ref 12–20)
CALCIUM SERPL-MCNC: 9.2 MG/DL (ref 8.5–10.1)
CHLORIDE SERPL-SCNC: 106 MMOL/L (ref 97–108)
CO2 SERPL-SCNC: 24 MMOL/L (ref 21–32)
COLOR UR: NORMAL
CREAT SERPL-MCNC: 1.02 MG/DL (ref 0.55–1.02)
DIFFERENTIAL METHOD BLD: NORMAL
EKG ATRIAL RATE: 68 BPM
EKG DIAGNOSIS: NORMAL
EKG P AXIS: 75 DEGREES
EKG P-R INTERVAL: 148 MS
EKG Q-T INTERVAL: 400 MS
EKG QRS DURATION: 86 MS
EKG QTC CALCULATION (BAZETT): 425 MS
EKG R AXIS: 50 DEGREES
EKG T AXIS: 82 DEGREES
EKG VENTRICULAR RATE: 68 BPM
EOSINOPHIL # BLD: 0.2 K/UL (ref 0–0.4)
EOSINOPHIL NFR BLD: 2 % (ref 0–7)
EPITH CASTS URNS QL MICRO: NORMAL /LPF
ERYTHROCYTE [DISTWIDTH] IN BLOOD BY AUTOMATED COUNT: 14.3 % (ref 11.5–14.5)
GLOBULIN SER CALC-MCNC: 2.7 G/DL (ref 2–4)
GLUCOSE SERPL-MCNC: 92 MG/DL (ref 65–100)
GLUCOSE UR STRIP.AUTO-MCNC: NEGATIVE MG/DL
HCT VFR BLD AUTO: 39.7 % (ref 35–47)
HGB BLD-MCNC: 14.3 G/DL (ref 11.5–16)
HGB UR QL STRIP: NEGATIVE
HYALINE CASTS URNS QL MICRO: NORMAL /LPF (ref 0–2)
IMM GRANULOCYTES # BLD AUTO: 0 K/UL (ref 0–0.04)
IMM GRANULOCYTES NFR BLD AUTO: 0 % (ref 0–0.5)
KETONES UR QL STRIP.AUTO: NEGATIVE MG/DL
LEUKOCYTE ESTERASE UR QL STRIP.AUTO: NEGATIVE
LIPASE SERPL-CCNC: 65 U/L (ref 13–75)
LYMPHOCYTES # BLD: 2.3 K/UL (ref 0.8–3.5)
LYMPHOCYTES NFR BLD: 22 % (ref 12–49)
MCH RBC QN AUTO: 31.2 PG (ref 26–34)
MCHC RBC AUTO-ENTMCNC: 36 G/DL (ref 30–36.5)
MCV RBC AUTO: 86.7 FL (ref 80–99)
MONOCYTES # BLD: 0.7 K/UL (ref 0–1)
MONOCYTES NFR BLD: 7 % (ref 5–13)
NEUTS SEG # BLD: 7 K/UL (ref 1.8–8)
NEUTS SEG NFR BLD: 68 % (ref 32–75)
NITRITE UR QL STRIP.AUTO: NEGATIVE
NRBC # BLD: 0 K/UL (ref 0–0.01)
NRBC BLD-RTO: 0 PER 100 WBC
PH UR STRIP: 5.5 (ref 5–8)
PLATELET # BLD AUTO: 282 K/UL (ref 150–400)
POTASSIUM SERPL-SCNC: 4.3 MMOL/L (ref 3.5–5.1)
PROT SERPL-MCNC: 6.5 G/DL (ref 6.4–8.2)
PROT UR STRIP-MCNC: NEGATIVE MG/DL
RBC # BLD AUTO: 4.58 M/UL (ref 3.8–5.2)
RBC #/AREA URNS HPF: NORMAL /HPF (ref 0–5)
RBC MORPH BLD: NORMAL
SODIUM SERPL-SCNC: 137 MMOL/L (ref 136–145)
SP GR UR REFRACTOMETRY: 1.01
URINE CULTURE IF INDICATED: NORMAL
UROBILINOGEN UR QL STRIP.AUTO: 0.2 EU/DL (ref 0.2–1)
WBC # BLD AUTO: 10.3 K/UL (ref 3.6–11)
WBC URNS QL MICRO: NORMAL /HPF (ref 0–4)

## 2024-08-08 PROCEDURE — 85025 COMPLETE CBC W/AUTO DIFF WBC: CPT

## 2024-08-08 PROCEDURE — 2580000003 HC RX 258: Performed by: EMERGENCY MEDICINE

## 2024-08-08 PROCEDURE — 81001 URINALYSIS AUTO W/SCOPE: CPT

## 2024-08-08 PROCEDURE — 83690 ASSAY OF LIPASE: CPT

## 2024-08-08 PROCEDURE — 96374 THER/PROPH/DIAG INJ IV PUSH: CPT

## 2024-08-08 PROCEDURE — 6360000002 HC RX W HCPCS: Performed by: EMERGENCY MEDICINE

## 2024-08-08 PROCEDURE — 74176 CT ABD & PELVIS W/O CONTRAST: CPT

## 2024-08-08 PROCEDURE — 96375 TX/PRO/DX INJ NEW DRUG ADDON: CPT

## 2024-08-08 PROCEDURE — 93005 ELECTROCARDIOGRAM TRACING: CPT | Performed by: EMERGENCY MEDICINE

## 2024-08-08 PROCEDURE — 99284 EMERGENCY DEPT VISIT MOD MDM: CPT

## 2024-08-08 PROCEDURE — 6370000000 HC RX 637 (ALT 250 FOR IP): Performed by: EMERGENCY MEDICINE

## 2024-08-08 PROCEDURE — 80053 COMPREHEN METABOLIC PANEL: CPT

## 2024-08-08 PROCEDURE — 36415 COLL VENOUS BLD VENIPUNCTURE: CPT

## 2024-08-08 RX ORDER — DICYCLOMINE HYDROCHLORIDE 10 MG/1
10 CAPSULE ORAL ONCE
Status: COMPLETED | OUTPATIENT
Start: 2024-08-08 | End: 2024-08-08

## 2024-08-08 RX ORDER — ONDANSETRON 2 MG/ML
4 INJECTION INTRAMUSCULAR; INTRAVENOUS ONCE
Status: COMPLETED | OUTPATIENT
Start: 2024-08-08 | End: 2024-08-08

## 2024-08-08 RX ORDER — FAMOTIDINE 40 MG/1
40 TABLET, FILM COATED ORAL DAILY
Qty: 30 TABLET | Refills: 0 | Status: SHIPPED | OUTPATIENT
Start: 2024-08-08

## 2024-08-08 RX ORDER — KETOROLAC TROMETHAMINE 30 MG/ML
15 INJECTION, SOLUTION INTRAMUSCULAR; INTRAVENOUS ONCE
Status: COMPLETED | OUTPATIENT
Start: 2024-08-08 | End: 2024-08-08

## 2024-08-08 RX ORDER — MORPHINE SULFATE 4 MG/ML
4 INJECTION, SOLUTION INTRAMUSCULAR; INTRAVENOUS
Status: DISCONTINUED | OUTPATIENT
Start: 2024-08-08 | End: 2024-08-08 | Stop reason: HOSPADM

## 2024-08-08 RX ORDER — 0.9 % SODIUM CHLORIDE 0.9 %
500 INTRAVENOUS SOLUTION INTRAVENOUS ONCE
Status: COMPLETED | OUTPATIENT
Start: 2024-08-08 | End: 2024-08-08

## 2024-08-08 RX ORDER — SUCRALFATE 1 G/1
1 TABLET ORAL 4 TIMES DAILY
Qty: 120 TABLET | Refills: 3 | Status: SHIPPED | OUTPATIENT
Start: 2024-08-08

## 2024-08-08 RX ORDER — ACETAMINOPHEN 500 MG
1000 TABLET ORAL
Status: COMPLETED | OUTPATIENT
Start: 2024-08-08 | End: 2024-08-08

## 2024-08-08 RX ORDER — DICYCLOMINE HYDROCHLORIDE 10 MG/1
10 CAPSULE ORAL
Qty: 60 CAPSULE | Refills: 0 | Status: SHIPPED | OUTPATIENT
Start: 2024-08-08

## 2024-08-08 RX ADMIN — KETOROLAC TROMETHAMINE 15 MG: 30 INJECTION, SOLUTION INTRAMUSCULAR at 12:50

## 2024-08-08 RX ADMIN — LIDOCAINE HYDROCHLORIDE 40 ML: 20 SOLUTION ORAL at 14:02

## 2024-08-08 RX ADMIN — ACETAMINOPHEN 1000 MG: 500 TABLET ORAL at 12:46

## 2024-08-08 RX ADMIN — ONDANSETRON 4 MG: 2 INJECTION INTRAMUSCULAR; INTRAVENOUS at 12:48

## 2024-08-08 RX ADMIN — SODIUM CHLORIDE 500 ML: 9 INJECTION, SOLUTION INTRAVENOUS at 12:57

## 2024-08-08 RX ADMIN — DICYCLOMINE HYDROCHLORIDE 10 MG: 10 CAPSULE ORAL at 12:47

## 2024-08-08 ASSESSMENT — PAIN DESCRIPTION - LOCATION: LOCATION: ABDOMEN

## 2024-08-08 ASSESSMENT — PAIN SCALES - GENERAL
PAINLEVEL_OUTOF10: 8
PAINLEVEL_OUTOF10: 10

## 2024-08-08 ASSESSMENT — PAIN - FUNCTIONAL ASSESSMENT: PAIN_FUNCTIONAL_ASSESSMENT: 0-10

## 2024-08-08 ASSESSMENT — PAIN DESCRIPTION - ORIENTATION: ORIENTATION: RIGHT

## 2024-08-08 NOTE — DISCHARGE INSTRUCTIONS
Thank you for choosing our Emergency Department for your care.  It is our privilege to care for you in your time of need.  In the next several days, you may receive a survey via email or mailed to your home about your experience with our team.  We would greatly appreciate you taking a few minutes to complete the survey, as we use this information to learn what we have done well and what we could be doing better. Thank you for trusting us with your care!    Below you will find a list of your tests from today's visit.   Labs  Recent Results (from the past 12 hour(s))   CBC with Auto Differential    Collection Time: 08/08/24 12:11 PM   Result Value Ref Range    WBC 10.3 3.6 - 11.0 K/uL    RBC 4.58 3.80 - 5.20 M/uL    Hemoglobin 14.3 11.5 - 16.0 g/dL    Hematocrit 39.7 35.0 - 47.0 %    MCV 86.7 80.0 - 99.0 FL    MCH 31.2 26.0 - 34.0 PG    MCHC 36.0 30.0 - 36.5 g/dL    RDW 14.3 11.5 - 14.5 %    Platelets 282 150 - 400 K/uL    Nucleated RBCs 0.0 0  WBC    nRBC 0.00 0.00 - 0.01 K/uL    Neutrophils % 68 32 - 75 %    Lymphocytes % 22 12 - 49 %    Monocytes % 7 5 - 13 %    Eosinophils % 2 0 - 7 %    Basophils % 1 0 - 1 %    Immature Granulocytes % 0 0.0 - 0.5 %    Neutrophils Absolute 7.0 1.8 - 8.0 K/UL    Lymphocytes Absolute 2.3 0.8 - 3.5 K/UL    Monocytes Absolute 0.7 0.0 - 1.0 K/UL    Eosinophils Absolute 0.2 0.0 - 0.4 K/UL    Basophils Absolute 0.1 0.0 - 0.1 K/UL    Immature Granulocytes Absolute 0.0 0.00 - 0.04 K/UL    Differential Type SMEAR SCANNED      RBC Comment NORMOCYTIC, NORMOCHROMIC     Comprehensive Metabolic Panel    Collection Time: 08/08/24 12:11 PM   Result Value Ref Range    Sodium 137 136 - 145 mmol/L    Potassium 4.3 3.5 - 5.1 mmol/L    Chloride 106 97 - 108 mmol/L    CO2 24 21 - 32 mmol/L    Anion Gap 7 5 - 15 mmol/L    Glucose 92 65 - 100 mg/dL    BUN 14 6 - 20 MG/DL    Creatinine 1.02 0.55 - 1.02 MG/DL    BUN/Creatinine Ratio 14 12 - 20      Est, Glom Filt Rate 62 >60 ml/min/1.73m2     Calcium 9.2 8.5 - 10.1 MG/DL    Total Bilirubin 1.6 (H) 0.2 - 1.0 MG/DL    ALT 30 12 - 78 U/L    AST 30 15 - 37 U/L    Alk Phosphatase 91 45 - 117 U/L    Total Protein 6.5 6.4 - 8.2 g/dL    Albumin 3.8 3.5 - 5.0 g/dL    Globulin 2.7 2.0 - 4.0 g/dL    Albumin/Globulin Ratio 1.4 1.1 - 2.2     Lipase    Collection Time: 08/08/24 12:11 PM   Result Value Ref Range    Lipase 65 13 - 75 U/L   Urinalysis with Reflex to Culture    Collection Time: 08/08/24 12:11 PM    Specimen: Urine   Result Value Ref Range    Color, UA YELLOW/STRAW      Appearance CLEAR CLEAR      Specific Gravity, UA 1.014      pH, Urine 5.5 5.0 - 8.0      Protein, UA Negative NEG mg/dL    Glucose, Ur Negative NEG mg/dL    Ketones, Urine Negative NEG mg/dL    Bilirubin, Urine Negative NEG      Blood, Urine Negative NEG      Urobilinogen, Urine 0.2 0.2 - 1.0 EU/dL    Nitrite, Urine Negative NEG      Leukocyte Esterase, Urine Negative NEG      WBC, UA 0-4 0 - 4 /hpf    RBC, UA 0-5 0 - 5 /hpf    Epithelial Cells, UA FEW FEW /lpf    BACTERIA, URINE Negative NEG /hpf    Urine Culture if Indicated CULTURE NOT INDICATED BY UA RESULT CNI      Hyaline Casts, UA 0-2 0 - 2 /lpf       Radiologic Studies  CT ABDOMEN PELVIS WO CONTRAST Additional Contrast? None   Final Result   Status post right nephrectomy. No renal calculi or evidence of   obstructive uropathy.         Electronically signed by Ifeanyi Aranda MD        ------------------------------------------------------------------------------------------------------------  The evaluation and treatment you received in the Emergency Department were for an urgent problem. It is important that you follow-up with a doctor, nurse practitioner, or physician assistant to:  (1) confirm your diagnosis,  (2) re-evaluation of changes in your illness and treatment, and (3) for ongoing care. Please take your discharge instructions with you when you go to your follow-up appointment.     If you have any problem arranging a follow-up

## 2024-08-08 NOTE — ED PROVIDER NOTES
[facility] EMERGENCY DEPT  EMERGENCY DEPARTMENT ENCOUNTER       Pt Name: Betina Gonzalez  MRN: [id number]  Birthdate 1960  Date of evaluation: 2024  Provider: Lamine Hernandez MD   PCP: Westley Chavez MD  Note Started: 12:10 PM EDT 24     CHIEF COMPLAINT       Chief Complaint   Patient presents with    Abdominal Pain     Pt ambulatory to bed with c/o RLQ and back pain. Pt reports having an one episode of black stool this morning.         HISTORY OF PRESENT ILLNESS: 1 or more elements      History From: {SAHPI:03699}  HPI Limitations: {HPI Limitations (Optional):00422}     Betina Gonzalez is a 64 y.o. female who presents ***     Nursing Notes were all reviewed and agreed with or any disagreements were addressed in the HPI.     REVIEW OF SYSTEMS      Review of Systems     Positives and Pertinent negatives as per HPI.    PAST HISTORY     Past Medical History:  Past Medical History:   Diagnosis Date    Pantoja's esophagus     Chronic kidney disease     Chronic pain     sciatic nerve-left    COVID     --hospitalized pneumonia    Diverticulosis     GERD (gastroesophageal reflux disease)     Hypertension     Renal carcinoma, right (HCC)     kidney    Sleep apnea     cpap         Past Surgical History:  Past Surgical History:   Procedure Laterality Date    CERVICAL DISCECTOMY  2003     SECTION   and     CHOLECYSTECTOMY, LAPAROSCOPIC      CT BIOPSY RENAL  2017    CT BIOPSY RENAL 2017 [facility] RAD CT    CYSTOSCOPY Left 2024    CYSTOSCOPY LEFT URETERAL STENT INSERTION performed by Terry Pretty MD at [facility] MAIN OR    CYSTOSCOPY Left 2024    LEFT CYSTOSCOPY,, LEFT URETEROSCOPY WITH LASER, POSSIBLE LEFT STENT PLACEMENT performed by Mohsen Patel MD at [facility] MAIN OR    ENDOSCOPY, COLON, DIAGNOSTIC      GYN      ablation    GYN  2015    hysterectomy    HERNIA REPAIR  2021    ROBOTIC ASSISTED LAPAROSCOPIC INCISIONAL HERNIA REPAIR  WITH MESH, messenteric biopsy     KIDNEY

## 2024-09-03 ASSESSMENT — SLEEP AND FATIGUE QUESTIONNAIRES
HAS YOUR RELATIONSHIP WITH FAMILY, FRIENDS OR WORK COLLEAGUES BEEN AFFECTED BECAUSE YOU ARE SLEEPY OR TIRED: YES, A LITTLE
DO YOU HAVE DIFFICULTY OPERATING A MOTOR VEHICLE FOR LONG DISTANCES (GREATER THAN 100 MILES) BECAUSE YOU BECOME SLEEPY: NO
DO YOU HAVE DIFFICULTY WATCHING A MOVIE OR VIDEO BECAUSE YOU BECOME SLEEPY OR TIRED: YES, MODERATE
HOW LIKELY ARE YOU TO NOD OFF OR FALL ASLEEP IN A CAR, WHILE STOPPED FOR A FEW MINUTES IN TRAFFIC: WOULD NEVER DOZE
HOW LIKELY ARE YOU TO NOD OFF OR FALL ASLEEP WHILE SITTING AND TALKING TO SOMEONE: WOULD NEVER DOZE
HAS YOUR MOOD BEEN AFFECTED BECAUSE YOU ARE SLEEPY OR TIRED: NO
HOW LIKELY ARE YOU TO NOD OFF OR FALL ASLEEP WHEN YOU ARE A PASSENGER IN A CAR FOR AN HOUR WITHOUT A BREAK: WOULD NEVER DOZE
HOW LIKELY ARE YOU TO NOD OFF OR FALL ASLEEP WHILE SITTING AND TALKING TO SOMEONE: WOULD NEVER DOZE
DO YOU HAVE DIFFICULTY CONCENTRATING ON THE THINGS YOU DO BECAUSE YOU ARE SLEEPY OR TIRED: YES, A LITTLE
DO YOU HAVE DIFFICULTY BEING AS ACTIVE AS YOU WANT TO BE IN THE MORNING BECAUSE YOU ARE SLEEPY OR TIRED: YES, LITTLE
HOW LIKELY ARE YOU TO NOD OFF OR FALL ASLEEP WHILE SITTING QUIETLY AFTER LUNCH WITHOUT ALCOHOL: SLIGHT CHANCE OF DOZING
DO YOU HAVE DIFFICULTY VISITING YOUR FAMILY OR FRIENDS IN THEIR HOME BECAUSE YOU BECOME SLEEPY OR TIRED: NO
HOW LIKELY ARE YOU TO NOD OFF OR FALL ASLEEP WHILE SITTING AND READING: WOULD NEVER DOZE
HOW LIKELY ARE YOU TO NOD OFF OR FALL ASLEEP WHILE WATCHING TV: MODERATE CHANCE OF DOZING
HOW LIKELY ARE YOU TO NOD OFF OR FALL ASLEEP IN A CAR, WHILE STOPPED FOR A FEW MINUTES IN TRAFFIC: WOULD NEVER DOZE
ESS TOTAL SCORE: 6
HOW LIKELY ARE YOU TO NOD OFF OR FALL ASLEEP WHILE SITTING INACTIVE IN A PUBLIC PLACE: SLIGHT CHANCE OF DOZING
HOW LIKELY ARE YOU TO NOD OFF OR FALL ASLEEP WHILE SITTING AND READING: WOULD NEVER DOZE
HOW LIKELY ARE YOU TO NOD OFF OR FALL ASLEEP WHILE WATCHING TV: MODERATE CHANCE OF DOZING
DO YOU HAVE DIFFICULTY OPERATING A MOTOR VEHICLE FOR SHORT DISTANCES (LESS THAN 100 MILES) BECAUSE YOU BECOME SLEEPY: NO
HOW LIKELY ARE YOU TO NOD OFF OR FALL ASLEEP WHILE SITTING QUIETLY AFTER LUNCH WITHOUT ALCOHOL: SLIGHT CHANCE OF DOZING
DO YOU GENERALLY HAVE DIFFICULTY REMEMBERING THINGS BECAUSE YOU ARE SLEEPY OR TIRED: NO
HOW LIKELY ARE YOU TO NOD OFF OR FALL ASLEEP WHEN YOU ARE A PASSENGER IN A CAR FOR AN HOUR WITHOUT A BREAK: WOULD NEVER DOZE
FOSQ SCORE: 16.5
HOW LIKELY ARE YOU TO NOD OFF OR FALL ASLEEP WHILE SITTING INACTIVE IN A PUBLIC PLACE: SLIGHT CHANCE OF DOZING
DO YOU HAVE DIFFICULTY BEING AS ACTIVE AS YOU WANT TO BE IN THE EVENING BECAUSE YOU ARE SLEEPY OR TIRED: YES, MODERATE
HOW LIKELY ARE YOU TO NOD OFF OR FALL ASLEEP WHILE LYING DOWN TO REST IN THE AFTERNOON WHEN CIRCUMSTANCES PERMIT: MODERATE CHANCE OF DOZING
HOW LIKELY ARE YOU TO NOD OFF OR FALL ASLEEP WHILE LYING DOWN TO REST IN THE AFTERNOON WHEN CIRCUMSTANCES PERMIT: MODERATE CHANCE OF DOZING

## 2024-09-04 ENCOUNTER — CLINICAL DOCUMENTATION (OUTPATIENT)
Age: 64
End: 2024-09-04

## 2024-09-04 ENCOUNTER — OFFICE VISIT (OUTPATIENT)
Age: 64
End: 2024-09-04
Payer: COMMERCIAL

## 2024-09-04 VITALS
HEIGHT: 63 IN | DIASTOLIC BLOOD PRESSURE: 87 MMHG | SYSTOLIC BLOOD PRESSURE: 122 MMHG | HEART RATE: 89 BPM | BODY MASS INDEX: 36.54 KG/M2 | WEIGHT: 206.2 LBS | TEMPERATURE: 98 F | OXYGEN SATURATION: 96 %

## 2024-09-04 DIAGNOSIS — I10 PRIMARY HYPERTENSION: ICD-10-CM

## 2024-09-04 DIAGNOSIS — G47.33 OSA (OBSTRUCTIVE SLEEP APNEA): Primary | ICD-10-CM

## 2024-09-04 PROCEDURE — 3074F SYST BP LT 130 MM HG: CPT | Performed by: INTERNAL MEDICINE

## 2024-09-04 PROCEDURE — 99213 OFFICE O/P EST LOW 20 MIN: CPT | Performed by: INTERNAL MEDICINE

## 2024-09-04 PROCEDURE — 3079F DIAST BP 80-89 MM HG: CPT | Performed by: INTERNAL MEDICINE

## 2024-09-04 NOTE — PROGRESS NOTES
[address], [place] [address]  Tel.  [phone]    Fax. [phone]     [address], [place] [address]  Tel.  [phone]    Fax. [phone] [address] [place] [address]  Tel.  [phone]    Fax. [phone]       Betina Gonzalez (: 1960) is a 64 y.o. female, established patient, seen for positive airway pressure follow-up and evaluation of the following chief complaint(s):   Sleep Problem (Follow-up appointment )       Betina Gonzalez was last seen by me on 10-25-23, prior notes reviewed in detail.  Home Sleep Apnea Test (HSAT) performed on 23  was indicative of an average AHI of 59.9 per hour.    ASSESSMENT/PLAN:   Diagnosis Orders   1. JOSE C (obstructive sleep apnea)  DME Order for (Specify) as OP      2. Primary hypertension        3. BMI 36.0-36.9,adult            On ResMed:  AirSense 11 AutoSet    Settings:  Min EPAP: 05 cmH2O  Max EPAP: 20 cmH2O    EPR: 2    Sleep Apnea -   * She is adherent with PAP therapy and PAP continues to benefit patient and remains necessary for control of her sleep apnea. Continue on current pressures    * Supplies for his device were ordered as noted below:    Orders Placed This Encounter   Procedures    DME Order for (Specify) as OP     Diagnosis: (G47.33) JOSE C (obstructive sleep apnea)  (primary encounter diagnosis)     Replacement Supplies for Positive Airway Pressure Therapy Device:   Duration of need: 99 months.      Nasal Cushion (Replace) 2 per month.   Nasal Interface Mask 1 every 3 months.     Headgear 1 every 6 months.     Tubing with heating element 1 every 3 months.     Filter(s) Disposable 2 per month.   Filter(s) Non-Disposable 1 every 6 months.   .    Water Chamber for Humidifier (Replace) 1 every 6 months.      Tucker Ramos MD, FAASM; NPI: [id number]    Electronically signed. Date:- 05/15/23     * She is familiar with the telephone monitoring

## 2024-09-04 NOTE — PATIENT INSTRUCTIONS
[address]  [place] [address]  Tel.  [phone]  Fax. [phone] [address]  [place] [address]  Tel.  [phone]  Fax. [phone] [address] [place] [address]  Tel.  [phone]  Fax. [phone]     Learning About CPAP for Sleep Apnea  What is CPAP?              CPAP is a small machine that you use at home every night while you sleep. It increases air pressure in your throat to keep your airway open. When you have sleep apnea, this can help you sleep better so you feel much better. CPAP stands for \"continuous positive airway pressure.\"  The CPAP machine will have one of the following:  A mask that covers your nose and mouth  Prongs that fit into your nose  A mask that covers your nose only, the most common type. This type is called NCPAP. The N stands for \"nasal.\"  Why is it done?  CPAP is usually the best treatment for obstructive sleep apnea. It is the first treatment choice and the most widely used. Your doctor may suggest CPAP if you have:  Moderate to severe sleep apnea.  Sleep apnea and coronary artery disease (CAD) or heart failure.  How does it help?  CPAP can help you have more normal sleep, so you feel less sleepy and more alert during the daytime.  CPAP may help keep heart failure or other heart problems from getting worse.  NCPAP may help lower your blood pressure.  If you use CPAP, your bed partner may also sleep better because you are not snoring or restless.  What are the side effects?  Some people who use CPAP have:  A dry or stuffy nose and a sore throat.  Irritated skin on the face.  Sore eyes.  Bloating.  If you have any of these problems, work with your doctor to fix them. Here are some things you can try:  Be sure the mask or nasal prongs fit well.  See if your doctor can adjust the pressure of your CPAP.  If your nose is dry, try a humidifier.  If your nose is runny or stuffy, try decongestant medicine or a steroid

## 2024-11-13 ENCOUNTER — HOSPITAL ENCOUNTER (OUTPATIENT)
Facility: HOSPITAL | Age: 64
Discharge: HOME | End: 2024-11-16
Payer: COMMERCIAL

## 2024-11-13 DIAGNOSIS — C64.9 MALIGNANT NEOPLASM OF KIDNEY, UNSPECIFIED LATERALITY (HCC): ICD-10-CM

## 2024-11-13 PROCEDURE — 71046 X-RAY EXAM CHEST 2 VIEWS: CPT

## (undated) DEVICE — FLEXIVA PULSE AND FLEXIVA PULSE TRACTIP LASER FIBERS ARE HIGH POWER SINGLE-USE: Brand: FLEXIVA PULSE

## (undated) DEVICE — SUTURE SZ 0 27IN 5/8 CIR UR-6  TAPER PT VIOLET ABSRB VICRYL J603H

## (undated) DEVICE — OPEN-END URETERAL CATHETER: Brand: COOK

## (undated) DEVICE — LAPAROSCOPIC TROCAR SLEEVE/SINGLE USE: Brand: KII® OPTICAL ACCESS SYSTEM

## (undated) DEVICE — TISSUE RETRIEVAL SYSTEM: Brand: INZII RETRIEVAL SYSTEM

## (undated) DEVICE — GLOVE ORANGE PI 7 1/2   MSG9075

## (undated) DEVICE — NITINOL STONE RETRIEVAL BASKET: Brand: ZERO TIP

## (undated) DEVICE — ELECTRO LUBE IS A SINGLE PATIENT USE DEVICE THAT IS INTENDED TO BE USED ON ELECTROSURGICAL ELECTRODES TO REDUCE STICKING.: Brand: KEY SURGICAL ELECTRO LUBE

## (undated) DEVICE — SUTURE V LOC 1 L18IN NONABSORBABLE GS-21 TAPERPOINT NDL VLOCN0327

## (undated) DEVICE — SOLUTION IRRIGATION STRL H2O 1000 ML UROMATIC CONTAINER

## (undated) DEVICE — GOWN,SIRUS,FABRNF,XL,20/CS: Brand: MEDLINE

## (undated) DEVICE — SYRINGE 20ML LL S/C 50

## (undated) DEVICE — CYSTO/BLADDER IRRIGATION SET, REGULATING CLAMP

## (undated) DEVICE — HYPODERMIC SAFETY NEEDLE: Brand: MONOJECT

## (undated) DEVICE — COVER,MAYO STAND,STERILE: Brand: MEDLINE

## (undated) DEVICE — COVER MPLR TIP CRV SCIS ACC DA VINCI

## (undated) DEVICE — Y-TYPE TUR/BLADDER IRRIGATION SET, REGULATING CLAMP

## (undated) DEVICE — CYSTO-SMH: Brand: MEDLINE INDUSTRIES, INC.

## (undated) DEVICE — GENERAL LAPAROSCOPY-MRMC: Brand: MEDLINE INDUSTRIES, INC.

## (undated) DEVICE — GOWN,SIRUS,NONRNF,SETINSLV,2XL,18/CS: Brand: MEDLINE

## (undated) DEVICE — SOLUTION IRRIG 1000ML STRL H2O USP PLAS POUR BTL

## (undated) DEVICE — SUTURE V-LOC 180 SZ 0 L12IN ABSRB GRN L37MM GS-21 1/2 CIR VLOCL0316

## (undated) DEVICE — SYRINGE MED 10ML LUERLOCK TIP W/O SFTY DISP

## (undated) DEVICE — TROCAR SITE CLOSURE DEVICE: Brand: ENDO CLOSE

## (undated) DEVICE — COLUMN DRAPE

## (undated) DEVICE — SYSTEM FASCIAL CLSR UNIQUE SHLDED WNG SAFE UNIF CONSISTENT

## (undated) DEVICE — SUTURE VCRL SZ 0 L27IN ABSRB UD L36MM CT-1 1/2 CIR J260H

## (undated) DEVICE — VISUALIZATION SYSTEM: Brand: CLEARIFY

## (undated) DEVICE — GARMENT,MEDLINE,DVT,INT,CALF,MED, GEN2: Brand: MEDLINE

## (undated) DEVICE — BLADELESS OBTURATOR: Brand: WECK VISTA

## (undated) DEVICE — BINDER ABD M/L H12IN FOR 46-62IN WHT 4 SLD PNL DSGN HOOP

## (undated) DEVICE — SUTURE VCRL SZ 4-0 L27IN ABSRB UD L19MM PS-2 3/8 CIR PRIM J426H

## (undated) DEVICE — SEAL UNIV 5-8MM DISP BX/10 -- DA VINCI XI - SNGL USE

## (undated) DEVICE — SPONGE GZ W4XL4IN COT 12 PLY TYP VII WVN C FLD DSGN STERILE

## (undated) DEVICE — TROCAR: Brand: KII FIOS FIRST ENTRY

## (undated) DEVICE — GUIDEWIRE ENDOSCP L150CM DIA0.035IN TIP 3CM PTFE NIT

## (undated) DEVICE — ARM DRAPE

## (undated) DEVICE — URETERAL ACCESS SHEATH SET: Brand: NAVIGATOR HD

## (undated) DEVICE — SOLUTION IRRIG 3000ML 0.9% SOD CHL USP UROMATIC PLAS CONT

## (undated) DEVICE — CYSTO MRMC: Brand: MEDLINE INDUSTRIES, INC.

## (undated) DEVICE — TOWEL SURG W17XL27IN STD BLU COT NONFENESTRATED PREWASHED